# Patient Record
Sex: MALE | Race: WHITE | NOT HISPANIC OR LATINO | Employment: OTHER | URBAN - METROPOLITAN AREA
[De-identification: names, ages, dates, MRNs, and addresses within clinical notes are randomized per-mention and may not be internally consistent; named-entity substitution may affect disease eponyms.]

---

## 2019-01-11 ENCOUNTER — OFFICE VISIT (OUTPATIENT)
Dept: URGENT CARE | Facility: CLINIC | Age: 79
End: 2019-01-11
Payer: MEDICARE

## 2019-01-11 VITALS
TEMPERATURE: 99 F | BODY MASS INDEX: 34.3 KG/M2 | OXYGEN SATURATION: 96 % | RESPIRATION RATE: 18 BRPM | SYSTOLIC BLOOD PRESSURE: 135 MMHG | HEART RATE: 91 BPM | DIASTOLIC BLOOD PRESSURE: 77 MMHG | WEIGHT: 245 LBS | HEIGHT: 71 IN

## 2019-01-11 DIAGNOSIS — J20.9 BRONCHITIS, ACUTE, WITH BRONCHOSPASM: Primary | ICD-10-CM

## 2019-01-11 DIAGNOSIS — R05.9 COUGH: ICD-10-CM

## 2019-01-11 DIAGNOSIS — Z87.898 HISTORY OF MULTIPLE PULMONARY NODULES: ICD-10-CM

## 2019-01-11 PROCEDURE — 99203 OFFICE O/P NEW LOW 30 MIN: CPT | Mod: S$GLB,,, | Performed by: PHYSICIAN ASSISTANT

## 2019-01-11 PROCEDURE — 71046 X-RAY EXAM CHEST 2 VIEWS: CPT | Mod: FY,S$GLB,, | Performed by: RADIOLOGY

## 2019-01-11 PROCEDURE — 71046 XR CHEST PA AND LATERAL: ICD-10-PCS | Mod: FY,S$GLB,, | Performed by: RADIOLOGY

## 2019-01-11 PROCEDURE — 99203 PR OFFICE/OUTPT VISIT, NEW, LEVL III, 30-44 MIN: ICD-10-PCS | Mod: S$GLB,,, | Performed by: PHYSICIAN ASSISTANT

## 2019-01-11 RX ORDER — TORSEMIDE 20 MG/1
TABLET ORAL
Status: ON HOLD | COMMUNITY
Start: 2018-12-12 | End: 2022-02-17 | Stop reason: HOSPADM

## 2019-01-11 RX ORDER — IRBESARTAN 300 MG/1
TABLET ORAL
COMMUNITY
Start: 2018-10-17 | End: 2022-02-15

## 2019-01-11 RX ORDER — PREDNISONE 20 MG/1
20 TABLET ORAL DAILY
Qty: 5 TABLET | Refills: 0 | Status: SHIPPED | OUTPATIENT
Start: 2019-01-11 | End: 2019-01-16

## 2019-01-11 RX ORDER — BENZONATATE 100 MG/1
100 CAPSULE ORAL EVERY 6 HOURS PRN
Qty: 30 CAPSULE | Refills: 1 | Status: SHIPPED | OUTPATIENT
Start: 2019-01-11 | End: 2020-01-11

## 2019-01-11 RX ORDER — PROMETHAZINE HYDROCHLORIDE AND DEXTROMETHORPHAN HYDROBROMIDE 6.25; 15 MG/5ML; MG/5ML
5 SYRUP ORAL NIGHTLY PRN
Qty: 60 ML | Refills: 0 | Status: SHIPPED | OUTPATIENT
Start: 2019-01-11 | End: 2022-02-15

## 2019-01-11 RX ORDER — ALBUTEROL SULFATE 90 UG/1
2 AEROSOL, METERED RESPIRATORY (INHALATION) EVERY 6 HOURS PRN
Qty: 1 INHALER | Refills: 1 | Status: SHIPPED | OUTPATIENT
Start: 2019-01-11 | End: 2023-01-01

## 2019-01-11 NOTE — PATIENT INSTRUCTIONS
An UPPER RESPIRATORY ILLNESS is initially caused by a virus or allergies 95% of the time. The goal to help you feel better is drying up the drainage & stopping the cough so the virus can run it's course in about 10 days. If your drainage becomes more thick and worse after 7-10 days of trying the below over the counter medications, please see your PCP or return to Urgent Care for further evaluation.  Take steroid medication in the morning.  · Use Tessalon Perles for daytime cough.  · Cough syrup at night as needed. Do not drink alcohol, drive, or operate machinery while taking this medication as it can cause drowsiness.  · Continue doing nebulizer treatments at home as needed.  · May use albuterol inhaler as needed for wheezing.  · Antihistamines can help with sinus congestion. You may take a non-drowsy type (Allegra, Claritin) during the day and a Benadryl at night.  · If you DO NOT have Hypertension or any history of palpitations, it is ok to take over the counter Sudafed or Mucinex D or Allegra-D or Claritin-D or Zyrtec-D.  · If you do take one of the above, it is ok to combine that with plain over the counter Mucinex or Allegra or Claritin or Zyrtec. If, for example, you are taking Zyrtec -D, you can combine that with Mucinex, but not Mucinex-D.  If you are taking Mucinex-D, you can combine that with plain Allegra or Claritin or Zyrtec.   · If you DO have Hypertension or palpitations, it is safe to take Coricidin HBP for relief of sinus symptoms.  Rest.    Drink plenty of fluids.     Follow up with your PCP or specialty clinic as directed in the next 1-2 weeks if not improved or as needed.  You can call [phone] to schedule an appointment with the appropriate provider.    · Go to the ED if your symptoms worsen or if you develop high fever, shortness of breath, or chest pain.  - You must understand that you have received an Urgent Care treatment only and that you may be released before all of your medical  problems are known or treated.   - You, the patient, will arrange for follow up care as instructed.   - If your condition worsens or fails to improve we recommend that you receive another evaluation at the ER immediately or contact your PCP to discuss your concerns or return here.       Bronchitis with Wheezing (Viral or Bacterial: Adult)    Bronchitis is an infection of the air passages. It often occurs during a cold and is usually caused by a virus. Symptoms include cough with mucus (phlegm) and low-grade fever. This illness is contagious during the first few days and is spread through the air by coughing and sneezing, or by direct contact (touching the sick person and then touching your own eyes, nose, or mouth).  If there is a lot of inflammation, air flow is restricted. The air passages may also go into spasm, especially if you have asthma. This causes wheezing and difficulty breathing even in people who do not have asthma.  Bronchitis usually lasts 7 to 14 days. The wheezing should improve with treatment during the first week. An inhaler is often prescribed to relax the air passages and stop wheezing. Antibiotics will be prescribed if your doctor thinks there is also a secondary bacterial infection.  Home care  · If symptoms are severe, rest at home for the first 2 to 3 days. When you go back to your usual activities, don't let yourself get too tired.  · Do not smoke. Also avoid being exposed to secondhand smoke.  · You may use over-the-counter medicine to control fever or pain, unless another medicine was prescribed. Note: If you have chronic liver or kidney disease or have ever had a stomach ulcer or gastrointestinal bleeding, talk with your healthcare provider before using these medicines. Also talk to your provider if you are taking medicine to prevent blood clots.) Aspirin should never be given to anyone younger than 18 years of age who is ill with a viral infection or fever. It may cause severe liver or  brain damage.  · Your appetite may be poor, so a light diet is fine. Avoid dehydration by drinking 6 to 8 glasses of fluids per day (such as water, soft drinks, sports drinks, juices, tea, or soup). Extra fluids will help loosen secretions in the nose and lungs.  · Over-the-counter cough, cold, and sore-throat medicines will not shorten the length of the illness, but they may be helpful to reduce symptoms. (Note: Do not use decongestants if you have high blood pressure.)  · If you were given an inhaler, use it exactly as directed. If you need to use it more often than prescribed, your condition may be worsening. If this happens, contact your healthcare provider.  · If prescribed, finish all antibiotic medicine, even if you are feeling better after only a few days.  Follow-up care  Follow up with your healthcare provider, or as advised. If you had an X-ray or ECG (electrocardiogram), a specialist will review it. You will be notified of any new findings that may affect your care.  Note: If you are age 65 or older, or if you have a chronic lung disease or condition that affects your immune system, or you smoke, talk to your healthcare provider about having a pneumococcal vaccinations and a yearly influenza vaccination (flu shot).  When to seek medical advice  Call your healthcare provider right away if any of these occur:  · Fever of 100.4°F (38°C) or higher  · Coughing up increasing amounts of colored sputum  · Weakness, drowsiness, headache, facial pain, ear pain, or a stiff neck  Call 911, or get immediate medical care  Contact emergency services right away if any of these occur.  · Coughing up blood  · Worsening weakness, drowsiness, headache, or stiff neck  · Increased wheezing not helped with medication, shortness of breath, or pain with breathing  Date Last Reviewed: 9/13/2015  © 4689-1183 [employer]. [address] [place] [address], [place] [address]. All rights reserved. This information is not intended  as a substitute for professional medical care. Always follow your healthcare professional's instructions.

## 2019-01-11 NOTE — PROGRESS NOTES
"Subjective:       Patient ID: Alexander Kelly is a 78 y.o. male.    Vitals:  height is 5' 11" (1.803 m) and weight is 111.1 kg (245 lb). His temperature is 98.7 °F (37.1 °C). His blood pressure is 135/77 and his pulse is 91. His respiration is 18 and oxygen saturation is 96%.     Chief Complaint: Cough    Patient is here for 4 days of productive cough and congestion. He has tried Mucinex with some relief. Denies fever/chills, n/v, diarrhea, SOB, or chest pain. Cough is worse at night, but other than the cough he overall feels well. Reports he is UTD on his vaccines including Pneumonia and Flu shot. His past medical history is somewhat unclear other than he knows he has high blood pressure, says he has a Symbicort nebulizer that he uses at home which he gets samples from his pulmonologist. Denies a history of COPD, asthma, or PNA. Says he first went to pul 3 years ago for wheezing and she did a CT lung scan which showed nodules above the level of the heart but repeat scan showed no growth. He has not seen her in about 3 years because his wheezing resolved with Symbicort and he has been doing great.      Cough   This is a new problem. Episode onset: 4 days. The problem has been unchanged. The problem occurs constantly. The cough is productive of sputum. Associated symptoms include nasal congestion and postnasal drip. Pertinent negatives include no chest pain, chills, ear congestion, ear pain, eye redness, fever, hemoptysis, myalgias, rash, rhinorrhea, sore throat, shortness of breath or wheezing. The symptoms are aggravated by lying down. He has tried OTC cough suppressant, a beta-agonist inhaler and steroid inhaler for the symptoms. The treatment provided mild relief. There is no history of asthma, COPD or pneumonia.       Constitution: Negative for chills, sweating, fatigue and fever.   HENT: Positive for congestion and postnasal drip. Negative for ear pain, sinus pain, sinus pressure, sore throat and voice change.  "   Neck: Negative for painful lymph nodes.   Cardiovascular: Negative for chest pain and palpitations.   Eyes: Negative for eye redness.   Respiratory: Positive for cough and sputum production. Negative for chest tightness, bloody sputum, COPD, shortness of breath, stridor, wheezing and asthma.    Gastrointestinal: Negative for nausea and vomiting.   Musculoskeletal: Negative for muscle ache.   Skin: Negative for rash.   Allergic/Immunologic: Negative for seasonal allergies and asthma.   Hematologic/Lymphatic: Negative for swollen lymph nodes.       Objective:      Physical Exam   Constitutional: He is oriented to person, place, and time. He appears well-developed and well-nourished. He is cooperative.  Non-toxic appearance. He does not appear ill. No distress.   HENT:   Head: Normocephalic and atraumatic.   Right Ear: Hearing, tympanic membrane, external ear and ear canal normal. No middle ear effusion.   Left Ear: Hearing, tympanic membrane, external ear and ear canal normal.  No middle ear effusion.   Nose: Mucosal edema present. No rhinorrhea or nasal deformity. No epistaxis. Right sinus exhibits no maxillary sinus tenderness and no frontal sinus tenderness. Left sinus exhibits no maxillary sinus tenderness and no frontal sinus tenderness.   Mouth/Throat: Uvula is midline, oropharynx is clear and moist and mucous membranes are normal. No trismus in the jaw. Normal dentition. No uvula swelling. No oropharyngeal exudate, posterior oropharyngeal edema or posterior oropharyngeal erythema.   Wearing bilateral hearing aids  Patient is hard of hearing   Eyes: EOM and lids are normal. No scleral icterus.       Pinguecula of lateral right eye   Neck: Trachea normal, full passive range of motion without pain and phonation normal. Neck supple.   Cardiovascular: Normal rate, regular rhythm, intact distal pulses and normal pulses.   Murmur heard.   Systolic murmur is present.  Pulmonary/Chest: Effort normal. No respiratory  distress. He has no decreased breath sounds. He has wheezes in the right upper field. He has no rhonchi. He has no rales.   Abdominal: Soft. Normal appearance and bowel sounds are normal. He exhibits no distension. There is no tenderness.   Musculoskeletal: Normal range of motion. He exhibits no edema or deformity.   Neurological: He is alert and oriented to person, place, and time. He exhibits normal muscle tone. Coordination normal.   Skin: Skin is warm, dry and intact. He is not diaphoretic. No pallor.   Psychiatric: He has a normal mood and affect. His speech is normal and behavior is normal. Judgment and thought content normal. Cognition and memory are normal.   Nursing note and vitals reviewed.        Xr Chest Pa And Lateral  Result Date: 1/11/2019  EXAMINATION: XR CHEST PA AND LATERAL   CLINICAL HISTORY: Cough   TECHNIQUE: PA and lateral views of the chest were performed.   COMPARISON: None.   FINDINGS: Mediastinal structures are midline.  Hilar contours are unremarkable.  Cardiac silhouette is normal in size.  Lung volumes are symmetric.  Subsegmental band like opacities noted within the right lung base, presumably atelectasis.  No consolidation.  No pneumothorax or pleural effusions.  No free air beneath the diaphragm.  No acute osseous abnormalities.  Degenerative changes of the spine noted.   IMPRESSION:  1. No acute radiographic findings in the chest.   Electronically signed by: Tristin Dubon MD   Date: 01/11/2019  Time: 11:51      Assessment:       1. Bronchitis, acute, with bronchospasm    2. Cough    3. History of multiple pulmonary nodules        Plan:       No evidence of PNA. Will treat for viral bronchitis. Warned of the risk of development of pneumonia in his age group and warning signs that should prompt ED visit. Has an appointment in early February with his PCP, will f/u with him at this point.    Bronchitis, acute, with bronchospasm  -     predniSONE (DELTASONE) 20 MG tablet; Take 1 tablet  (20 mg total) by mouth once daily. for 5 days  Dispense: 5 tablet; Refill: 0  -     albuterol (VENTOLIN HFA) 90 mcg/actuation inhaler; Inhale 2 puffs into the lungs every 6 (six) hours as needed for Wheezing. Rescue  Dispense: 1 Inhaler; Refill: 1    Cough  -     XR CHEST PA AND LATERAL; Future; Expected date: 01/11/2019  -     promethazine-dextromethorphan (PROMETHAZINE-DM) 6.25-15 mg/5 mL Syrp; Take 5 mLs by mouth nightly as needed.  Dispense: 60 mL; Refill: 0  -     benzonatate (TESSALON PERLES) 100 MG capsule; Take 1 capsule (100 mg total) by mouth every 6 (six) hours as needed for Cough.  Dispense: 30 capsule; Refill: 1    History of multiple pulmonary nodules  -     XR CHEST PA AND LATERAL; Future; Expected date: 01/11/2019      Patient Instructions   An UPPER RESPIRATORY ILLNESS is initially caused by a virus or allergies 95% of the time. The goal to help you feel better is drying up the drainage & stopping the cough so the virus can run it's course in about 10 days. If your drainage becomes more thick and worse after 7-10 days of trying the below over the counter medications, please see your PCP or return to Urgent Care for further evaluation.  Take steroid medication in the morning.  · Use Tessalon Perles for daytime cough.  · Cough syrup at night as needed. Do not drink alcohol, drive, or operate machinery while taking this medication as it can cause drowsiness.  · Continue doing nebulizer treatments at home as needed.  · May use albuterol inhaler as needed for wheezing.  · Antihistamines can help with sinus congestion. You may take a non-drowsy type (Allegra, Claritin) during the day and a Benadryl at night.  · If you DO NOT have Hypertension or any history of palpitations, it is ok to take over the counter Sudafed or Mucinex D or Allegra-D or Claritin-D or Zyrtec-D.  · If you do take one of the above, it is ok to combine that with plain over the counter Mucinex or Allegra or Claritin or Zyrtec. If, for  example, you are taking Zyrtec -D, you can combine that with Mucinex, but not Mucinex-D.  If you are taking Mucinex-D, you can combine that with plain Allegra or Claritin or Zyrtec.   · If you DO have Hypertension or palpitations, it is safe to take Coricidin HBP for relief of sinus symptoms.  Rest.    Drink plenty of fluids.     Follow up with your PCP or specialty clinic as directed in the next 1-2 weeks if not improved or as needed.  You can call [phone] to schedule an appointment with the appropriate provider.    · Go to the ED if your symptoms worsen or if you develop high fever, shortness of breath, or chest pain.  - You must understand that you have received an Urgent Care treatment only and that you may be released before all of your medical problems are known or treated.   - You, the patient, will arrange for follow up care as instructed.   - If your condition worsens or fails to improve we recommend that you receive another evaluation at the ER immediately or contact your PCP to discuss your concerns or return here.       Bronchitis with Wheezing (Viral or Bacterial: Adult)    Bronchitis is an infection of the air passages. It often occurs during a cold and is usually caused by a virus. Symptoms include cough with mucus (phlegm) and low-grade fever. This illness is contagious during the first few days and is spread through the air by coughing and sneezing, or by direct contact (touching the sick person and then touching your own eyes, nose, or mouth).  If there is a lot of inflammation, air flow is restricted. The air passages may also go into spasm, especially if you have asthma. This causes wheezing and difficulty breathing even in people who do not have asthma.  Bronchitis usually lasts 7 to 14 days. The wheezing should improve with treatment during the first week. An inhaler is often prescribed to relax the air passages and stop wheezing. Antibiotics will be prescribed if your doctor thinks  there is also a secondary bacterial infection.  Home care  · If symptoms are severe, rest at home for the first 2 to 3 days. When you go back to your usual activities, don't let yourself get too tired.  · Do not smoke. Also avoid being exposed to secondhand smoke.  · You may use over-the-counter medicine to control fever or pain, unless another medicine was prescribed. Note: If you have chronic liver or kidney disease or have ever had a stomach ulcer or gastrointestinal bleeding, talk with your healthcare provider before using these medicines. Also talk to your provider if you are taking medicine to prevent blood clots.) Aspirin should never be given to anyone younger than 18 years of age who is ill with a viral infection or fever. It may cause severe liver or brain damage.  · Your appetite may be poor, so a light diet is fine. Avoid dehydration by drinking 6 to 8 glasses of fluids per day (such as water, soft drinks, sports drinks, juices, tea, or soup). Extra fluids will help loosen secretions in the nose and lungs.  · Over-the-counter cough, cold, and sore-throat medicines will not shorten the length of the illness, but they may be helpful to reduce symptoms. (Note: Do not use decongestants if you have high blood pressure.)  · If you were given an inhaler, use it exactly as directed. If you need to use it more often than prescribed, your condition may be worsening. If this happens, contact your healthcare provider.  · If prescribed, finish all antibiotic medicine, even if you are feeling better after only a few days.  Follow-up care  Follow up with your healthcare provider, or as advised. If you had an X-ray or ECG (electrocardiogram), a specialist will review it. You will be notified of any new findings that may affect your care.  Note: If you are age 65 or older, or if you have a chronic lung disease or condition that affects your immune system, or you smoke, talk to your healthcare provider about having a  pneumococcal vaccinations and a yearly influenza vaccination (flu shot).  When to seek medical advice  Call your healthcare provider right away if any of these occur:  · Fever of 100.4°F (38°C) or higher  · Coughing up increasing amounts of colored sputum  · Weakness, drowsiness, headache, facial pain, ear pain, or a stiff neck  Call 911, or get immediate medical care  Contact emergency services right away if any of these occur.  · Coughing up blood  · Worsening weakness, drowsiness, headache, or stiff neck  · Increased wheezing not helped with medication, shortness of breath, or pain with breathing  Date Last Reviewed: 9/13/2015  © 7607-6867 [employer]. [address] [place] [address], [place] [address]. All rights reserved. This information is not intended as a substitute for professional medical care. Always follow your healthcare professional's instructions.

## 2021-12-08 ENCOUNTER — OFFICE VISIT (OUTPATIENT)
Dept: URGENT CARE | Facility: CLINIC | Age: 81
End: 2021-12-08
Payer: MEDICARE

## 2021-12-08 VITALS
OXYGEN SATURATION: 98 % | HEIGHT: 71 IN | BODY MASS INDEX: 34.3 KG/M2 | RESPIRATION RATE: 16 BRPM | DIASTOLIC BLOOD PRESSURE: 70 MMHG | SYSTOLIC BLOOD PRESSURE: 128 MMHG | HEART RATE: 60 BPM | TEMPERATURE: 97 F | WEIGHT: 245 LBS

## 2021-12-08 DIAGNOSIS — S50.812A ABRASION OF LEFT FOREARM, INITIAL ENCOUNTER: Primary | ICD-10-CM

## 2021-12-08 PROCEDURE — 99214 PR OFFICE/OUTPT VISIT, EST, LEVL IV, 30-39 MIN: ICD-10-PCS | Mod: 25,S$GLB,, | Performed by: FAMILY MEDICINE

## 2021-12-08 PROCEDURE — 90715 TDAP VACCINE GREATER THAN OR EQUAL TO 7YO IM: ICD-10-PCS | Mod: AT,S$GLB,, | Performed by: FAMILY MEDICINE

## 2021-12-08 PROCEDURE — 90715 TDAP VACCINE 7 YRS/> IM: CPT | Mod: AT,S$GLB,, | Performed by: FAMILY MEDICINE

## 2021-12-08 PROCEDURE — 90471 TDAP VACCINE GREATER THAN OR EQUAL TO 7YO IM: ICD-10-PCS | Mod: AT,S$GLB,, | Performed by: FAMILY MEDICINE

## 2021-12-08 PROCEDURE — 90471 IMMUNIZATION ADMIN: CPT | Mod: AT,S$GLB,, | Performed by: FAMILY MEDICINE

## 2021-12-08 PROCEDURE — 99214 OFFICE O/P EST MOD 30 MIN: CPT | Mod: 25,S$GLB,, | Performed by: FAMILY MEDICINE

## 2021-12-08 RX ORDER — MUPIROCIN 20 MG/G
OINTMENT TOPICAL
Qty: 22 G | Refills: 1 | Status: SHIPPED | OUTPATIENT
Start: 2021-12-08 | End: 2022-03-23

## 2022-01-24 ENCOUNTER — TELEPHONE (OUTPATIENT)
Dept: CARDIOLOGY | Facility: CLINIC | Age: 82
End: 2022-01-24
Payer: MEDICARE

## 2022-02-09 ENCOUNTER — OFFICE VISIT (OUTPATIENT)
Dept: CARDIOLOGY | Facility: CLINIC | Age: 82
End: 2022-02-09
Payer: MEDICARE

## 2022-02-09 ENCOUNTER — TELEPHONE (OUTPATIENT)
Dept: WOUND CARE | Facility: CLINIC | Age: 82
End: 2022-02-09
Payer: MEDICARE

## 2022-02-09 VITALS
WEIGHT: 251.75 LBS | DIASTOLIC BLOOD PRESSURE: 76 MMHG | SYSTOLIC BLOOD PRESSURE: 173 MMHG | BODY MASS INDEX: 35.24 KG/M2 | HEIGHT: 71 IN | HEART RATE: 58 BPM

## 2022-02-09 DIAGNOSIS — R60.0 LOCALIZED EDEMA: ICD-10-CM

## 2022-02-09 DIAGNOSIS — I87.2 VENOUS STASIS DERMATITIS OF BOTH LOWER EXTREMITIES: ICD-10-CM

## 2022-02-09 DIAGNOSIS — L97.221 VENOUS STASIS ULCER OF LEFT CALF LIMITED TO BREAKDOWN OF SKIN WITH VARICOSE VEINS: ICD-10-CM

## 2022-02-09 DIAGNOSIS — E66.9 OBESITY (BMI 30-39.9): ICD-10-CM

## 2022-02-09 DIAGNOSIS — R21 RASH: ICD-10-CM

## 2022-02-09 DIAGNOSIS — F10.11 HISTORY OF ALCOHOL ABUSE: ICD-10-CM

## 2022-02-09 DIAGNOSIS — I50.31 ACUTE DIASTOLIC CONGESTIVE HEART FAILURE: ICD-10-CM

## 2022-02-09 DIAGNOSIS — I83.022 VENOUS STASIS ULCER OF LEFT CALF LIMITED TO BREAKDOWN OF SKIN WITH VARICOSE VEINS: ICD-10-CM

## 2022-02-09 DIAGNOSIS — R06.02 SOB (SHORTNESS OF BREATH): ICD-10-CM

## 2022-02-09 DIAGNOSIS — I89.0 LYMPHEDEMA OF BOTH LOWER EXTREMITIES: ICD-10-CM

## 2022-02-09 PROCEDURE — 3078F DIAST BP <80 MM HG: CPT | Mod: CPTII,S$GLB,, | Performed by: INTERNAL MEDICINE

## 2022-02-09 PROCEDURE — 1159F MED LIST DOCD IN RCRD: CPT | Mod: CPTII,S$GLB,, | Performed by: INTERNAL MEDICINE

## 2022-02-09 PROCEDURE — 3288F PR FALLS RISK ASSESSMENT DOCUMENTED: ICD-10-PCS | Mod: CPTII,S$GLB,, | Performed by: INTERNAL MEDICINE

## 2022-02-09 PROCEDURE — 1100F PTFALLS ASSESS-DOCD GE2>/YR: CPT | Mod: CPTII,S$GLB,, | Performed by: INTERNAL MEDICINE

## 2022-02-09 PROCEDURE — 99999 PR PBB SHADOW E&M-EST. PATIENT-LVL IV: ICD-10-PCS | Mod: PBBFAC,,, | Performed by: INTERNAL MEDICINE

## 2022-02-09 PROCEDURE — 3288F FALL RISK ASSESSMENT DOCD: CPT | Mod: CPTII,S$GLB,, | Performed by: INTERNAL MEDICINE

## 2022-02-09 PROCEDURE — 1159F PR MEDICATION LIST DOCUMENTED IN MEDICAL RECORD: ICD-10-PCS | Mod: CPTII,S$GLB,, | Performed by: INTERNAL MEDICINE

## 2022-02-09 PROCEDURE — 1126F AMNT PAIN NOTED NONE PRSNT: CPT | Mod: CPTII,S$GLB,, | Performed by: INTERNAL MEDICINE

## 2022-02-09 PROCEDURE — 99205 OFFICE O/P NEW HI 60 MIN: CPT | Mod: S$GLB,,, | Performed by: INTERNAL MEDICINE

## 2022-02-09 PROCEDURE — 1126F PR PAIN SEVERITY QUANTIFIED, NO PAIN PRESENT: ICD-10-PCS | Mod: CPTII,S$GLB,, | Performed by: INTERNAL MEDICINE

## 2022-02-09 PROCEDURE — 1100F PR PT FALLS ASSESS DOC 2+ FALLS/FALL W/INJURY/YR: ICD-10-PCS | Mod: CPTII,S$GLB,, | Performed by: INTERNAL MEDICINE

## 2022-02-09 PROCEDURE — 3078F PR MOST RECENT DIASTOLIC BLOOD PRESSURE < 80 MM HG: ICD-10-PCS | Mod: CPTII,S$GLB,, | Performed by: INTERNAL MEDICINE

## 2022-02-09 PROCEDURE — 3077F SYST BP >= 140 MM HG: CPT | Mod: CPTII,S$GLB,, | Performed by: INTERNAL MEDICINE

## 2022-02-09 PROCEDURE — 99205 PR OFFICE/OUTPT VISIT, NEW, LEVL V, 60-74 MIN: ICD-10-PCS | Mod: S$GLB,,, | Performed by: INTERNAL MEDICINE

## 2022-02-09 PROCEDURE — 3077F PR MOST RECENT SYSTOLIC BLOOD PRESSURE >= 140 MM HG: ICD-10-PCS | Mod: CPTII,S$GLB,, | Performed by: INTERNAL MEDICINE

## 2022-02-09 PROCEDURE — 99999 PR PBB SHADOW E&M-EST. PATIENT-LVL IV: CPT | Mod: PBBFAC,,, | Performed by: INTERNAL MEDICINE

## 2022-02-09 NOTE — PROGRESS NOTES
[facility]      Chief Complaint   Patient presents with    Lymphedema       Patient ID: Alexander Kelly is a 81 y.o. male with HFpEF, HTN, COPD, bilateral hearing loss, alcoholism, obesity, who presents for an initial appointment.  Pertinent history/events are as follows:     -Pt kindly referred by Dr. Martin for evaluation of lymphedema.    HPI:  Mr. Kelly states he was diagnosed with BLE lymphedema 4 years ago.  He uses a lymphedema pump daily.  Reports gaining 30 pounds in 3 weeks in 12/2021.  States he lost the weight after being started on bumex and metolazone.  Reports SOB starting 1 month ago, which is new for him.  He currently takes bumex 1 mg daily, and metolazone 2.5 mg daily at needed for weight gain.  Formerly smoked 1 pack a day for 16 years.  Quit at age 32.      Past Medical History:   Diagnosis Date    Bilateral hearing loss     Hypertension      History reviewed. No pertinent surgical history.  Social History     Socioeconomic History    Marital status:    Tobacco Use    Smoking status: Never Smoker    Smokeless tobacco: Never Used     Family History   Problem Relation Age of Onset    No Known Problems Mother     No Known Problems Father        Review of patient's allergies indicates:  No Known Allergies    Medication List with Changes/Refills   Current Medications    ALBUTEROL (VENTOLIN HFA) 90 MCG/ACTUATION INHALER    Inhale 2 puffs into the lungs every 6 (six) hours as needed for Wheezing. Rescue    IRBESARTAN (AVAPRO) 300 MG TABLET        MUPIROCIN (BACTROBAN) 2 % OINTMENT    Apply to affected area 3 times daily    PROMETHAZINE-DEXTROMETHORPHAN (PROMETHAZINE-DM) 6.25-15 MG/5 ML SYRP    Take 5 mLs by mouth nightly as needed.    TORSEMIDE (DEMADEX) 20 MG TAB           Review of Systems  Constitution: Denies chills, fever, and sweats.  HENT: Denies headaches or blurry vision.  Cardiovascular: Denies chest pain or irregular heart beat.  Respiratory: Denies cough or  "shortness of breath.  Gastrointestinal: Denies abdominal pain, nausea, or vomiting.  Musculoskeletal: Positive for leg swelling.  Neurological: Denies dizziness or focal weakness.  Psychiatric/Behavioral: Normal mental status.  Hematologic/Lymphatic: Denies bleeding problem or easy bruising/bleeding.  Skin: Denies rash or suspicious lesions    Physical Examination  Ht 5' 11" (1.803 m)   Wt 114.2 kg (251 lb 12.3 oz)   BMI 35.11 kg/m²     Constitutional: No acute distress, conversant  HEENT: Sclera anicteric, Pupils equal, round and reactive to light, extraocular motions intact, Oropharynx clear  Neck: No JVD, no carotid bruits  Cardiovascular: regular rate and rhythm, no murmur, rubs or gallops, normal S1/S2  Pulmonary: Clear to auscultation bilaterally  Abdominal: Abdomen soft, nontender, nondistended, positive bowel sounds  Extremities: BLE's with trace pitting edema and changes consistent with lymphedema  Left shin with a moderate sized healing wound.   Both legs with diffuse, erythematous, lacy rash    Pulses:  Carotid pulses are 2+ on the right side, and 2+ on the left side.  Radial pulses are 2+ on the right side, and 2+ on the left side.   Femoral pulses are 2+ on the right side, and 2+ on the left side.  Popliteal pulses are 2+ on the right side, and 2+ on the left side.   Dorsalis pedis pulses are 2+ on the right side, and 2+ on the left side.   Posterior tibial pulses are 2+ on the right side, and 2+ on the left side.    Skin: No ecchymosis, erythema, or ulcers  Psych: Alert and oriented x 3, appropriate affect  Neuro: CNII-XII intact, no focal deficits    Labs:  Most Recent Data  CBC: No results found for: WBC, HGB, HCT, PLT, MCV, RDW  BMP: No results found for: NA, K, CL, CO2, BUN, CREATININE, GLU, CALCIUM, MG, PHOS  LFTS; No results found for: PROT, ALBUMIN, BILITOT, AST, ALKPHOS, ALT, GGT  COAGS: No results found for: INR, PROTIME, PTT  FLP: No results found for: CHOL, HDL, LDLCALC, TRIG, " CHOLHDL  CARDIAC: No results found for: TROPONINI, CKMB, BNP        Assessment/Plan:  Alexander Kelly is a 81 y.o. male with HFpEF, HTN, COPD, bilateral hearing loss, alcoholism, obesity, who presents for an initial appointment.    1. BLE Lymphedema with Venous Stasis Wounds- Check BLE venous reflux study and TUNDE study.  Refer to lymphedema clinic.  Continue bumex 1 mg daily.  Continue metolazone 2.5 mg daily at needed for weight gain of at least 2 pounds in a day or 5 pounds in a week.  Refer to wound care.  Given rash, refer to Dermatology for consideration for biopsy.   Pt to limit sodium intake to 2,000 mg daily.  Limit volume intake to 1.5 liters daily.      2. SOB- Check echo, CXR, and BNP.      3. HTN- Continue current medications.  Pt to keep log of blood pressure/heart rate and bring in next visit for review.     4. Obesity- Encourage diet, exercise and weight loss.      Follow up in 1 month    Total duration of face to face visit time 45 minutes.  Total time spent counseling greater than fifty percent of total visit time.  Counseling included discussion regarding imaging findings, diagnosis, possibilities, treatment options, risks and benefits.  The patient had many questions regarding the options and long-term effects.    Artur Healy MD, PhD  Interventional Cardiology

## 2022-02-09 NOTE — TELEPHONE ENCOUNTER
Patient wife Mrs. Tran called back and stated that she don't see no need for Mr. Munoz to come to wound care because he has a appointment with the dermatology on tomorrow 02/10/2022 at 11 a.m.

## 2022-02-09 NOTE — TELEPHONE ENCOUNTER
Call patient no one answer to home phone are cell phone, left message on home phone informing them that this was [facility] on [address]. Was calling to schedule Mr. Alexander laureano this was the available  date/time 02/22/2022@1030a.m to give me a call back to confirm this appointment number was left [phone]

## 2022-02-09 NOTE — TELEPHONE ENCOUNTER
----- Message from Maria Antonia Snider MA sent at 2/9/2022 10:56 AM CST -----  Regarding: Referral  Good Morning Staff!    Dr. Healy put in a referral for this patient to see someone in your clinic. Can someone call and assist the patient with scheduling?    Thanks,  Maria Antonia

## 2022-02-09 NOTE — PATIENT INSTRUCTIONS
Assessment/Plan:  Alexander Kelly is a 81 y.o. male with HFpEF, HTN, COPD, bilateral hearing loss, alcoholism, obesity, who presents for an initial appointment.    1. BLE Lymphedema with Venous Stasis Wounds- Check BLE venous reflux study and TUNDE study.  Refer to lymphedema clinic.  Continue bumex 1 mg daily.  Continue metolazone 2.5 mg daily at needed for weight gain of at least 2 pounds in a day or 5 pounds in a week.  Refer to wound care.  Given rash, refer to Dermatology for consideration for biopsy.   Pt to limit sodium intake to 2,000 mg daily.  Limit volume intake to 1.5 liters daily.      2. SOB- Check echo, CXR, and BNP.      3. HTN- Continue current medications.  Pt to keep log of blood pressure/heart rate and bring in next visit for review.     4. Obesity- Encourage diet, exercise and weight loss.      Follow up in 1 month

## 2022-02-10 ENCOUNTER — OFFICE VISIT (OUTPATIENT)
Dept: DERMATOLOGY | Facility: CLINIC | Age: 82
End: 2022-02-10
Payer: MEDICARE

## 2022-02-10 VITALS — WEIGHT: 251 LBS | BODY MASS INDEX: 35.01 KG/M2

## 2022-02-10 DIAGNOSIS — L30.9 ECZEMA OF BOTH HANDS: ICD-10-CM

## 2022-02-10 DIAGNOSIS — L85.3 XEROSIS CUTIS: ICD-10-CM

## 2022-02-10 DIAGNOSIS — Z85.828 HISTORY OF SKIN CANCER: ICD-10-CM

## 2022-02-10 DIAGNOSIS — L82.1 SEBORRHEIC KERATOSES: ICD-10-CM

## 2022-02-10 DIAGNOSIS — R21 RASH: ICD-10-CM

## 2022-02-10 DIAGNOSIS — T14.8XXA ABRASION: Primary | ICD-10-CM

## 2022-02-10 DIAGNOSIS — I89.0 LYMPHEDEMA OF BOTH LOWER EXTREMITIES: ICD-10-CM

## 2022-02-10 DIAGNOSIS — L57.0 MULTIPLE ACTINIC KERATOSES: ICD-10-CM

## 2022-02-10 LAB
ANION GAP SERPL CALC-SCNC: 14.2 MMOL/L (ref 9–18)
BNP SERPL-MCNC: 639 PG/ML
BUN BLD-MCNC: 12 MG/DL (ref 7–21)
BUN/CREAT SERPL: 18 (ref 6–22)
CALC OSMOLALITY: 252 MOSM/KG (ref 275–295)
CALCIUM SERPL-MCNC: 9.4 MG/DL (ref 8.5–10.3)
CHLORIDE SERPL-SCNC: 89 MMOL/L (ref 98–107)
CO2 SERPL-SCNC: 28 MMOL/L (ref 21–31)
CREAT SERPL-MCNC: 0.68 MG/DL (ref 0.7–1.2)
EGFR: 105 ML/MIN
GFR: 90 ML/MIN
GLUCOSE SERPL-MCNC: 86 MG/DL (ref 70–100)
POTASSIUM SERPL-SCNC: 5.2 MMOL/L (ref 3.5–5)
SODIUM BLD-SCNC: 126 MMOL/L (ref 135–145)

## 2022-02-10 PROCEDURE — 1159F MED LIST DOCD IN RCRD: CPT | Mod: CPTII,S$GLB,, | Performed by: DERMATOLOGY

## 2022-02-10 PROCEDURE — 17000 PR DESTRUCTION(LASER SURGERY,CRYOSURGERY,CHEMOSURGERY),PREMALIGNANT LESIONS,FIRST LESION: ICD-10-PCS | Mod: S$GLB,,, | Performed by: DERMATOLOGY

## 2022-02-10 PROCEDURE — 99204 OFFICE O/P NEW MOD 45 MIN: CPT | Mod: 25,S$GLB,, | Performed by: DERMATOLOGY

## 2022-02-10 PROCEDURE — 1160F RVW MEDS BY RX/DR IN RCRD: CPT | Mod: CPTII,S$GLB,, | Performed by: DERMATOLOGY

## 2022-02-10 PROCEDURE — 17003 DESTRUCT PREMALG LES 2-14: CPT | Mod: S$GLB,,, | Performed by: DERMATOLOGY

## 2022-02-10 PROCEDURE — 99999 PR PBB SHADOW E&M-EST. PATIENT-LVL III: CPT | Mod: PBBFAC,,, | Performed by: DERMATOLOGY

## 2022-02-10 PROCEDURE — 1101F PT FALLS ASSESS-DOCD LE1/YR: CPT | Mod: CPTII,S$GLB,, | Performed by: DERMATOLOGY

## 2022-02-10 PROCEDURE — 17000 DESTRUCT PREMALG LESION: CPT | Mod: S$GLB,,, | Performed by: DERMATOLOGY

## 2022-02-10 PROCEDURE — 1160F PR REVIEW ALL MEDS BY PRESCRIBER/CLIN PHARMACIST DOCUMENTED: ICD-10-PCS | Mod: CPTII,S$GLB,, | Performed by: DERMATOLOGY

## 2022-02-10 PROCEDURE — 17003 DESTRUCTION, PREMALIGNANT LESIONS; SECOND THROUGH 14 LESIONS: ICD-10-PCS | Mod: S$GLB,,, | Performed by: DERMATOLOGY

## 2022-02-10 PROCEDURE — 99999 PR PBB SHADOW E&M-EST. PATIENT-LVL III: ICD-10-PCS | Mod: PBBFAC,,, | Performed by: DERMATOLOGY

## 2022-02-10 PROCEDURE — 3288F FALL RISK ASSESSMENT DOCD: CPT | Mod: CPTII,S$GLB,, | Performed by: DERMATOLOGY

## 2022-02-10 PROCEDURE — 1159F PR MEDICATION LIST DOCUMENTED IN MEDICAL RECORD: ICD-10-PCS | Mod: CPTII,S$GLB,, | Performed by: DERMATOLOGY

## 2022-02-10 PROCEDURE — 1126F AMNT PAIN NOTED NONE PRSNT: CPT | Mod: CPTII,S$GLB,, | Performed by: DERMATOLOGY

## 2022-02-10 PROCEDURE — 1126F PR PAIN SEVERITY QUANTIFIED, NO PAIN PRESENT: ICD-10-PCS | Mod: CPTII,S$GLB,, | Performed by: DERMATOLOGY

## 2022-02-10 PROCEDURE — 1101F PR PT FALLS ASSESS DOC 0-1 FALLS W/OUT INJ PAST YR: ICD-10-PCS | Mod: CPTII,S$GLB,, | Performed by: DERMATOLOGY

## 2022-02-10 PROCEDURE — 3288F PR FALLS RISK ASSESSMENT DOCUMENTED: ICD-10-PCS | Mod: CPTII,S$GLB,, | Performed by: DERMATOLOGY

## 2022-02-10 PROCEDURE — 99204 PR OFFICE/OUTPT VISIT, NEW, LEVL IV, 45-59 MIN: ICD-10-PCS | Mod: 25,S$GLB,, | Performed by: DERMATOLOGY

## 2022-02-10 PROCEDURE — 87070 CULTURE OTHR SPECIMN AEROBIC: CPT | Performed by: DERMATOLOGY

## 2022-02-10 NOTE — PROGRESS NOTES
"  Subjective:       Patient ID:  Alexander Kelly is a 81 y.o. male who presents for   Chief Complaint   Patient presents with    Rash     Legs for several years     Spot     scalp    Skin Check     ubse     Record review 2/9  HPI:  Mr. Kelly states he was diagnosed with BLE lymphedema 4 years ago.  He uses a lymphedema pump daily.  Reports gaining 30 pounds in 3 weeks in 12/2021.  States he lost the weight after being started on bumex and metolazone.  Reports SOB starting 1 month ago, which is new for him.  He currently takes bumex 1 mg daily, and metolazone 2.5 mg daily at needed for weight gain.  Formerly smoked 1 pack a day for 16 years.  Quit at age 32.       1. BLE Lymphedema with Venous Stasis Wounds- Check BLE venous reflux study and TUNDE study.  Refer to lymphedema clinic.  Continue bumex 1 mg daily.  Continue metolazone 2.5 mg daily at needed for weight gain of at least 2 pounds in a day or 5 pounds in a week.  Refer to wound care.  Given rash, refer to Dermatology for consideration for biopsy.   Pt to limit sodium intake to 2,000 mg daily.  Limit volume intake to 1.5 liters daily."      Has referral in for pt/lymphedema clinic from yesterday.  Did not schedule with wound care.     Some of hx per daughter who accompanies him.    He fell several weeks ago and has abrasions on left arm and both lower legs using mupirocin ug, dry skin legs using lubriderm, has compression pump at home using bid for lymphedema which has increased recently due to his weight gain.    Also had skin cancer removed per Dr Ryan mohs surgery, several new spots on scalp    Would like spots on back checked.  Rash on hands for years worse in winter uses lubriderm has problems with fissures.       Review of Systems   Constitutional: Negative for fever, chills, weight loss, weight gain, fatigue, night sweats and malaise.   Skin: Negative for daily sunscreen use, activity-related sunscreen use and wears hat.   Hematologic/Lymphatic: " Bruises/bleeds easily.        Objective:    Physical Exam   Constitutional: He appears well-developed and well-nourished.   Neurological: He is alert and oriented to person, place, and time.   Psychiatric: He has a normal mood and affect.   Skin:   Areas Examined (abnormalities noted in diagram):   Scalp / Hair Palpated and Inspected  Head / Face Inspection Performed  Neck Inspection Performed  Chest / Axilla Inspection Performed  Back Inspection Performed  RUE Inspected  LUE Inspection Performed  RLE Inspected  LLE Inspection Performed                   Diagram Legend     Erythematous scaling macule/papule c/w actinic keratosis       Vascular papule c/w angioma      Pigmented verrucoid papule/plaque c/w seborrheic keratosis      Yellow umbilicated papule c/w sebaceous hyperplasia      Irregularly shaped tan macule c/w lentigo     1-2 mm smooth white papules consistent with Milia      Movable subcutaneous cyst with punctum c/w epidermal inclusion cyst      Subcutaneous movable cyst c/w pilar cyst      Firm pink to brown papule c/w dermatofibroma      Pedunculated fleshy papule(s) c/w skin tag(s)      Evenly pigmented macule c/w junctional nevus     Mildly variegated pigmented, slightly irregular-bordered macule c/w mildly atypical nevus      Flesh colored to evenly pigmented papule c/w intradermal nevus       Pink pearly papule/plaque c/w basal cell carcinoma      Erythematous hyperkeratotic cursted plaque c/w SCC      Surgical scar with no sign of skin cancer recurrence      Open and closed comedones      Inflammatory papules and pustules      Verrucoid papule consistent consistent with wart     Erythematous eczematous patches and plaques     Dystrophic onycholytic nail with subungual debris c/w onychomycosis     Umbilicated papule    Erythematous-base heme-crusted tan verrucoid plaque consistent with inflamed seborrheic keratosis     Erythematous Silvery Scaling Plaque c/w Psoriasis     See  annotation      Assessment / Plan:        Abrasions  -     Aerobic culture  Cont mupirocin    Rash  -     Ambulatory referral/consult to Dermatology    Lymphedema of both lower extremities  Has referral to PT lymphedema clinic    History of skin cancer  Area(s) of previous NMSC evaluated with no signs of recurrence.    . No lesions suspicious for malignancy noted.    Recommend daily sun protection/avoidance and use of at least SPF 30, broad spectrum sunscreen (OTC drug).       Multiple actinic keratoses   Cryosurgery Procedure Note    Verbal consent from the patient is obtained and the patient is aware of the precancerous quality and need for treatment of these lesions. Liquid nitrogen cryosurgery is applied to the 3 actinic keratoses, as detailed in the physical exam, to produce a freeze injury.      Eczema of both hands  lubriderm lotion  Liquid band aid for fissures    Xerosis cutis  Cont lubriderm      Seborrheic keratoses  reassurance               Follow up in about 6 months (around 8/10/2022), or if symptoms worsen or fail to improve.

## 2022-02-12 LAB — BACTERIA SPEC AEROBE CULT: NORMAL

## 2022-02-14 ENCOUNTER — HOSPITAL ENCOUNTER (OUTPATIENT)
Dept: CARDIOLOGY | Facility: HOSPITAL | Age: 82
Discharge: HOME | End: 2022-02-14
Attending: INTERNAL MEDICINE
Payer: MEDICARE

## 2022-02-14 ENCOUNTER — HOSPITAL ENCOUNTER (INPATIENT)
Facility: HOSPITAL | Age: 82
LOS: 3 days | Discharge: HOME | DRG: 291 | End: 2022-02-17
Attending: EMERGENCY MEDICINE | Admitting: INTERNAL MEDICINE
Payer: MEDICARE

## 2022-02-14 VITALS
HEART RATE: 68 BPM | WEIGHT: 251 LBS | HEIGHT: 71 IN | DIASTOLIC BLOOD PRESSURE: 90 MMHG | BODY MASS INDEX: 35.14 KG/M2 | SYSTOLIC BLOOD PRESSURE: 170 MMHG

## 2022-02-14 DIAGNOSIS — R06.02 SOB (SHORTNESS OF BREATH): ICD-10-CM

## 2022-02-14 DIAGNOSIS — M79.89 LEG SWELLING: ICD-10-CM

## 2022-02-14 DIAGNOSIS — I50.31 ACUTE DIASTOLIC CONGESTIVE HEART FAILURE: Primary | ICD-10-CM

## 2022-02-14 DIAGNOSIS — R07.9 CHEST PAIN: ICD-10-CM

## 2022-02-14 DIAGNOSIS — R06.02 SHORTNESS OF BREATH: ICD-10-CM

## 2022-02-14 PROBLEM — J90 BILATERAL PLEURAL EFFUSION: Status: ACTIVE | Noted: 2022-02-14

## 2022-02-14 PROBLEM — I27.20 PULMONARY HYPERTENSION: Status: ACTIVE | Noted: 2022-02-14

## 2022-02-14 PROBLEM — J96.01 ACUTE RESPIRATORY FAILURE WITH HYPOXIA AND HYPERCARBIA: Status: ACTIVE | Noted: 2022-02-14

## 2022-02-14 PROBLEM — I50.33 ACUTE ON CHRONIC DIASTOLIC CONGESTIVE HEART FAILURE: Status: ACTIVE | Noted: 2022-02-14

## 2022-02-14 PROBLEM — J96.02 ACUTE RESPIRATORY FAILURE WITH HYPOXIA AND HYPERCARBIA: Status: ACTIVE | Noted: 2022-02-14

## 2022-02-14 LAB
ALBUMIN SERPL BCP-MCNC: 4 G/DL (ref 3.5–5.2)
ALP SERPL-CCNC: 181 U/L (ref 55–135)
ALT SERPL W/O P-5'-P-CCNC: 29 U/L (ref 10–44)
ANION GAP SERPL CALC-SCNC: 9 MMOL/L (ref 8–16)
ASCENDING AORTA: 4.14 CM
AST SERPL-CCNC: 34 U/L (ref 10–40)
AV INDEX (PROSTH): 0.47
AV MEAN GRADIENT: 10 MMHG
AV PEAK GRADIENT: 19 MMHG
AV VALVE AREA: 1.66 CM2
AV VELOCITY RATIO: 0.47
BASOPHILS # BLD AUTO: 0.03 K/UL (ref 0–0.2)
BASOPHILS NFR BLD: 0.4 % (ref 0–1.9)
BILIRUB SERPL-MCNC: 2 MG/DL (ref 0.1–1)
BNP SERPL-MCNC: 669 PG/ML (ref 0–99)
BSA FOR ECHO PROCEDURE: 2.39 M2
BUN SERPL-MCNC: 11 MG/DL (ref 8–23)
CALCIUM SERPL-MCNC: 9.4 MG/DL (ref 8.7–10.5)
CHLORIDE SERPL-SCNC: 91 MMOL/L (ref 95–110)
CO2 SERPL-SCNC: 32 MMOL/L (ref 23–29)
CREAT SERPL-MCNC: 0.8 MG/DL (ref 0.5–1.4)
CV ECHO LV RWT: 0.32 CM
DIFFERENTIAL METHOD: ABNORMAL
DOP CALC AO PEAK VEL: 2.19 M/S
DOP CALC AO VTI: 56.68 CM
DOP CALC LVOT AREA: 3.5 CM2
DOP CALC LVOT DIAMETER: 2.11 CM
DOP CALC LVOT PEAK VEL: 1.03 M/S
DOP CALC LVOT STROKE VOLUME: 93.87 CM3
DOP CALCLVOT PEAK VEL VTI: 26.86 CM
E WAVE DECELERATION TIME: 237.62 MSEC
E/A RATIO: 1.39
E/E' RATIO: 15.33 M/S
ECHO LV POSTERIOR WALL: 0.9 CM (ref 0.6–1.1)
EJECTION FRACTION: 65 %
EOSINOPHIL # BLD AUTO: 0.1 K/UL (ref 0–0.5)
EOSINOPHIL NFR BLD: 1.6 % (ref 0–8)
ERYTHROCYTE [DISTWIDTH] IN BLOOD BY AUTOMATED COUNT: 15.4 % (ref 11.5–14.5)
EST. GFR  (AFRICAN AMERICAN): >60 ML/MIN/1.73 M^2
EST. GFR  (NON AFRICAN AMERICAN): >60 ML/MIN/1.73 M^2
FRACTIONAL SHORTENING: 43 % (ref 28–44)
GLUCOSE SERPL-MCNC: 82 MG/DL (ref 70–110)
HCT VFR BLD AUTO: 44.7 % (ref 40–54)
HGB BLD-MCNC: 14.6 G/DL (ref 14–18)
IMM GRANULOCYTES # BLD AUTO: 0.04 K/UL (ref 0–0.04)
IMM GRANULOCYTES NFR BLD AUTO: 0.5 % (ref 0–0.5)
INTERVENTRICULAR SEPTUM: 0.98 CM (ref 0.6–1.1)
IVRT: 105.61 MSEC
LA MAJOR: 7 CM
LA MINOR: 6.24 CM
LA WIDTH: 4.11 CM
LEFT ATRIUM SIZE: 4.78 CM
LEFT ATRIUM VOLUME INDEX MOD: 47.4 ML/M2
LEFT ATRIUM VOLUME INDEX: 47.5 ML/M2
LEFT ATRIUM VOLUME MOD: 110 CM3
LEFT ATRIUM VOLUME: 110.18 CM3
LEFT INTERNAL DIMENSION IN SYSTOLE: 3.22 CM (ref 2.1–4)
LEFT VENTRICLE DIASTOLIC VOLUME INDEX: 67.18 ML/M2
LEFT VENTRICLE DIASTOLIC VOLUME: 155.85 ML
LEFT VENTRICLE MASS INDEX: 88 G/M2
LEFT VENTRICLE SYSTOLIC VOLUME INDEX: 18 ML/M2
LEFT VENTRICLE SYSTOLIC VOLUME: 41.69 ML
LEFT VENTRICULAR INTERNAL DIMENSION IN DIASTOLE: 5.63 CM (ref 3.5–6)
LEFT VENTRICULAR MASS: 204.53 G
LV LATERAL E/E' RATIO: 14.38 M/S
LV SEPTAL E/E' RATIO: 16.43 M/S
LYMPHOCYTES # BLD AUTO: 1.7 K/UL (ref 1–4.8)
LYMPHOCYTES NFR BLD: 21.7 % (ref 18–48)
MCH RBC QN AUTO: 30.2 PG (ref 27–31)
MCHC RBC AUTO-ENTMCNC: 32.7 G/DL (ref 32–36)
MCV RBC AUTO: 93 FL (ref 82–98)
MONOCYTES # BLD AUTO: 0.9 K/UL (ref 0.3–1)
MONOCYTES NFR BLD: 12.4 % (ref 4–15)
MV PEAK A VEL: 0.83 M/S
MV PEAK E VEL: 1.15 M/S
MV STENOSIS PRESSURE HALF TIME: 68.91 MS
MV VALVE AREA P 1/2 METHOD: 3.19 CM2
NEUTROPHILS # BLD AUTO: 4.8 K/UL (ref 1.8–7.7)
NEUTROPHILS NFR BLD: 63.4 % (ref 38–73)
NRBC BLD-RTO: 0 /100 WBC
PISA TR MAX VEL: 3.56 M/S
PLATELET # BLD AUTO: 259 K/UL (ref 150–450)
PMV BLD AUTO: 10.3 FL (ref 9.2–12.9)
POTASSIUM SERPL-SCNC: 4.8 MMOL/L (ref 3.5–5.1)
PROT SERPL-MCNC: 7.2 G/DL (ref 6–8.4)
PULM VEIN S/D RATIO: 0.67
PV PEAK D VEL: 0.73 M/S
PV PEAK S VEL: 0.49 M/S
RA MAJOR: 6.08 CM
RA PRESSURE: 15 MMHG
RA WIDTH: 4.15 CM
RBC # BLD AUTO: 4.83 M/UL (ref 4.6–6.2)
RIGHT VENTRICULAR END-DIASTOLIC DIMENSION: 4.25 CM
SINUS: 3.75 CM
SODIUM SERPL-SCNC: 132 MMOL/L (ref 136–145)
STJ: 3.17 CM
TDI LATERAL: 0.08 M/S
TDI SEPTAL: 0.07 M/S
TDI: 0.08 M/S
TR MAX PG: 51 MMHG
TRICUSPID ANNULAR PLANE SYSTOLIC EXCURSION: 2.3 CM
TROPONIN I SERPL DL<=0.01 NG/ML-MCNC: 0.02 NG/ML (ref 0–0.03)
TV REST PULMONARY ARTERY PRESSURE: 66 MMHG
WBC # BLD AUTO: 7.61 K/UL (ref 3.9–12.7)

## 2022-02-14 PROCEDURE — 93306 TTE W/DOPPLER COMPLETE: CPT | Mod: 26,,, | Performed by: INTERNAL MEDICINE

## 2022-02-14 PROCEDURE — 93005 ELECTROCARDIOGRAM TRACING: CPT

## 2022-02-14 PROCEDURE — 84484 ASSAY OF TROPONIN QUANT: CPT | Performed by: EMERGENCY MEDICINE

## 2022-02-14 PROCEDURE — 99285 EMERGENCY DEPT VISIT HI MDM: CPT | Mod: 25

## 2022-02-14 PROCEDURE — 83880 ASSAY OF NATRIURETIC PEPTIDE: CPT | Performed by: EMERGENCY MEDICINE

## 2022-02-14 PROCEDURE — 96376 TX/PRO/DX INJ SAME DRUG ADON: CPT

## 2022-02-14 PROCEDURE — 93010 ELECTROCARDIOGRAM REPORT: CPT | Mod: ,,, | Performed by: INTERNAL MEDICINE

## 2022-02-14 PROCEDURE — 80053 COMPREHEN METABOLIC PANEL: CPT | Performed by: EMERGENCY MEDICINE

## 2022-02-14 PROCEDURE — 63600175 PHARM REV CODE 636 W HCPCS: Performed by: EMERGENCY MEDICINE

## 2022-02-14 PROCEDURE — 93010 EKG 12-LEAD: ICD-10-PCS | Mod: ,,, | Performed by: INTERNAL MEDICINE

## 2022-02-14 PROCEDURE — 93306 TTE W/DOPPLER COMPLETE: CPT

## 2022-02-14 PROCEDURE — 93306 ECHO (CUPID ONLY): ICD-10-PCS | Mod: 26,,, | Performed by: INTERNAL MEDICINE

## 2022-02-14 PROCEDURE — G0378 HOSPITAL OBSERVATION PER HR: HCPCS

## 2022-02-14 PROCEDURE — U0002 COVID-19 LAB TEST NON-CDC: HCPCS | Performed by: EMERGENCY MEDICINE

## 2022-02-14 PROCEDURE — 85025 COMPLETE CBC W/AUTO DIFF WBC: CPT | Performed by: EMERGENCY MEDICINE

## 2022-02-14 PROCEDURE — 12000002 HC ACUTE/MED SURGE SEMI-PRIVATE ROOM

## 2022-02-14 RX ORDER — FUROSEMIDE 10 MG/ML
40 INJECTION INTRAMUSCULAR; INTRAVENOUS
Status: COMPLETED | OUTPATIENT
Start: 2022-02-14 | End: 2022-02-14

## 2022-02-14 RX ORDER — IBUPROFEN 200 MG
16 TABLET ORAL
Status: DISCONTINUED | OUTPATIENT
Start: 2022-02-15 | End: 2022-02-17 | Stop reason: HOSPADM

## 2022-02-14 RX ORDER — ACETAMINOPHEN 325 MG/1
650 TABLET ORAL EVERY 4 HOURS PRN
Status: DISCONTINUED | OUTPATIENT
Start: 2022-02-15 | End: 2022-02-17 | Stop reason: HOSPADM

## 2022-02-14 RX ORDER — BUDESONIDE AND FORMOTEROL FUMARATE DIHYDRATE 160; 4.5 UG/1; UG/1
2 AEROSOL RESPIRATORY (INHALATION) EVERY 12 HOURS
COMMUNITY
Start: 2022-01-14 | End: 2023-01-01 | Stop reason: SDUPTHER

## 2022-02-14 RX ORDER — IBUPROFEN 200 MG
24 TABLET ORAL
Status: DISCONTINUED | OUTPATIENT
Start: 2022-02-15 | End: 2022-02-17 | Stop reason: HOSPADM

## 2022-02-14 RX ORDER — ONDANSETRON 4 MG/1
4 TABLET, ORALLY DISINTEGRATING ORAL EVERY 8 HOURS PRN
Status: DISCONTINUED | OUTPATIENT
Start: 2022-02-15 | End: 2022-02-17 | Stop reason: HOSPADM

## 2022-02-14 RX ORDER — FAMCICLOVIR 500 MG/1
500 TABLET ORAL 3 TIMES DAILY
COMMUNITY
Start: 2022-01-08 | End: 2022-02-15

## 2022-02-14 RX ORDER — VALSARTAN 320 MG/1
320 TABLET ORAL DAILY
COMMUNITY
Start: 2021-12-23 | End: 2022-06-13 | Stop reason: SDUPTHER

## 2022-02-14 RX ORDER — FUROSEMIDE 10 MG/ML
40 INJECTION INTRAMUSCULAR; INTRAVENOUS
Status: DISCONTINUED | OUTPATIENT
Start: 2022-02-15 | End: 2022-02-15

## 2022-02-14 RX ORDER — METOLAZONE 2.5 MG/1
2.5 TABLET ORAL DAILY
Status: ON HOLD | COMMUNITY
Start: 2022-01-14 | End: 2022-02-17 | Stop reason: HOSPADM

## 2022-02-14 RX ORDER — POTASSIUM CHLORIDE 20 MEQ/1
20 TABLET, EXTENDED RELEASE ORAL DAILY
COMMUNITY
Start: 2022-02-04 | End: 2022-06-13 | Stop reason: SDUPTHER

## 2022-02-14 RX ORDER — MAG HYDROX/ALUMINUM HYD/SIMETH 200-200-20
30 SUSPENSION, ORAL (FINAL DOSE FORM) ORAL 4 TIMES DAILY PRN
Status: DISCONTINUED | OUTPATIENT
Start: 2022-02-15 | End: 2022-02-17 | Stop reason: HOSPADM

## 2022-02-14 RX ORDER — SODIUM CHLORIDE 0.9 % (FLUSH) 0.9 %
10 SYRINGE (ML) INJECTION
Status: DISCONTINUED | OUTPATIENT
Start: 2022-02-15 | End: 2022-02-17 | Stop reason: HOSPADM

## 2022-02-14 RX ORDER — GABAPENTIN 100 MG/1
CAPSULE ORAL
COMMUNITY
Start: 2022-01-08 | End: 2022-02-15 | Stop reason: ALTCHOICE

## 2022-02-14 RX ORDER — SODIUM CHLORIDE 0.9 % (FLUSH) 0.9 %
10 SYRINGE (ML) INJECTION EVERY 8 HOURS PRN
Status: DISCONTINUED | OUTPATIENT
Start: 2022-02-15 | End: 2022-02-17 | Stop reason: HOSPADM

## 2022-02-14 RX ORDER — GLUCAGON 1 MG
1 KIT INJECTION
Status: DISCONTINUED | OUTPATIENT
Start: 2022-02-15 | End: 2022-02-17 | Stop reason: HOSPADM

## 2022-02-14 RX ORDER — ENOXAPARIN SODIUM 100 MG/ML
40 INJECTION SUBCUTANEOUS EVERY 24 HOURS
Status: DISCONTINUED | OUTPATIENT
Start: 2022-02-15 | End: 2022-02-17 | Stop reason: HOSPADM

## 2022-02-14 RX ORDER — SIMETHICONE 80 MG
1 TABLET,CHEWABLE ORAL 4 TIMES DAILY PRN
Status: DISCONTINUED | OUTPATIENT
Start: 2022-02-15 | End: 2022-02-17 | Stop reason: HOSPADM

## 2022-02-14 RX ORDER — AMLODIPINE AND BENAZEPRIL HYDROCHLORIDE 5; 40 MG/1; MG/1
1 CAPSULE ORAL DAILY
COMMUNITY
Start: 2021-10-26 | End: 2022-02-15

## 2022-02-14 RX ORDER — BISOPROLOL FUMARATE 5 MG/1
5 TABLET, FILM COATED ORAL DAILY
COMMUNITY
Start: 2021-11-09 | End: 2022-03-23

## 2022-02-14 RX ORDER — BUMETANIDE 1 MG/1
1 TABLET ORAL DAILY
COMMUNITY
Start: 2021-12-09 | End: 2022-06-13 | Stop reason: SDUPTHER

## 2022-02-14 RX ORDER — VALSARTAN AND HYDROCHLOROTHIAZIDE 320; 25 MG/1; MG/1
TABLET, FILM COATED ORAL
COMMUNITY
Start: 2021-11-09 | End: 2022-02-15

## 2022-02-14 RX ORDER — TALC
6 POWDER (GRAM) TOPICAL NIGHTLY PRN
Status: DISCONTINUED | OUTPATIENT
Start: 2022-02-15 | End: 2022-02-17 | Stop reason: HOSPADM

## 2022-02-14 RX ADMIN — FUROSEMIDE 40 MG: 10 INJECTION, SOLUTION INTRAVENOUS at 09:02

## 2022-02-14 NOTE — Clinical Note
Is this patient a high probability for COVID-19?: No   Diagnosis: Acute diastolic congestive heart failure [488002]   Future Attending Provider: DIA PATINO [[id number]]   Admitting Provider:: DIA PATINO [[id number]]

## 2022-02-15 PROBLEM — I50.31 ACUTE DIASTOLIC CONGESTIVE HEART FAILURE: Status: ACTIVE | Noted: 2022-02-14

## 2022-02-15 PROBLEM — J44.9 COPD (CHRONIC OBSTRUCTIVE PULMONARY DISEASE): Status: ACTIVE | Noted: 2022-02-15

## 2022-02-15 LAB
ANION GAP SERPL CALC-SCNC: 11 MMOL/L (ref 8–16)
BUN SERPL-MCNC: 12 MG/DL (ref 8–23)
CALCIUM SERPL-MCNC: 9.3 MG/DL (ref 8.7–10.5)
CHLORIDE SERPL-SCNC: 91 MMOL/L (ref 95–110)
CO2 SERPL-SCNC: 32 MMOL/L (ref 23–29)
CREAT SERPL-MCNC: 0.7 MG/DL (ref 0.5–1.4)
CTP QC/QA: YES
EST. GFR  (AFRICAN AMERICAN): >60 ML/MIN/1.73 M^2
EST. GFR  (NON AFRICAN AMERICAN): >60 ML/MIN/1.73 M^2
GLUCOSE SERPL-MCNC: 77 MG/DL (ref 70–110)
MAGNESIUM SERPL-MCNC: 2 MG/DL (ref 1.6–2.6)
POCT GLUCOSE: 105 MG/DL (ref 70–110)
POCT GLUCOSE: 119 MG/DL (ref 70–110)
POCT GLUCOSE: 122 MG/DL (ref 70–110)
POCT GLUCOSE: 74 MG/DL (ref 70–110)
POTASSIUM SERPL-SCNC: 4.1 MMOL/L (ref 3.5–5.1)
SARS-COV-2 RDRP RESP QL NAA+PROBE: NEGATIVE
SODIUM SERPL-SCNC: 134 MMOL/L (ref 136–145)
TROPONIN I SERPL DL<=0.01 NG/ML-MCNC: 0.02 NG/ML (ref 0–0.03)
TSH SERPL DL<=0.005 MIU/L-ACNC: 0.45 UIU/ML (ref 0.4–4)

## 2022-02-15 PROCEDURE — 99223 1ST HOSP IP/OBS HIGH 75: CPT | Mod: ,,, | Performed by: NURSE PRACTITIONER

## 2022-02-15 PROCEDURE — 11000001 HC ACUTE MED/SURG PRIVATE ROOM

## 2022-02-15 PROCEDURE — 25000003 PHARM REV CODE 250: Performed by: NURSE PRACTITIONER

## 2022-02-15 PROCEDURE — 83735 ASSAY OF MAGNESIUM: CPT

## 2022-02-15 PROCEDURE — 63600175 PHARM REV CODE 636 W HCPCS: Performed by: NURSE PRACTITIONER

## 2022-02-15 PROCEDURE — 99900035 HC TECH TIME PER 15 MIN (STAT)

## 2022-02-15 PROCEDURE — 96376 TX/PRO/DX INJ SAME DRUG ADON: CPT

## 2022-02-15 PROCEDURE — 27000221 HC OXYGEN, UP TO 24 HOURS

## 2022-02-15 PROCEDURE — 63600175 PHARM REV CODE 636 W HCPCS

## 2022-02-15 PROCEDURE — 99223 PR INITIAL HOSPITAL CARE,LEVL III: ICD-10-PCS | Mod: ,,, | Performed by: NURSE PRACTITIONER

## 2022-02-15 PROCEDURE — 96365 THER/PROPH/DIAG IV INF INIT: CPT

## 2022-02-15 PROCEDURE — 25000242 PHARM REV CODE 250 ALT 637 W/ HCPCS

## 2022-02-15 PROCEDURE — 94640 AIRWAY INHALATION TREATMENT: CPT

## 2022-02-15 PROCEDURE — 63600175 PHARM REV CODE 636 W HCPCS: Performed by: HOSPITALIST

## 2022-02-15 PROCEDURE — 84484 ASSAY OF TROPONIN QUANT: CPT

## 2022-02-15 PROCEDURE — 84443 ASSAY THYROID STIM HORMONE: CPT

## 2022-02-15 PROCEDURE — 25000003 PHARM REV CODE 250

## 2022-02-15 PROCEDURE — 80048 BASIC METABOLIC PNL TOTAL CA: CPT

## 2022-02-15 RX ORDER — VALSARTAN 160 MG/1
320 TABLET ORAL DAILY
Status: DISCONTINUED | OUTPATIENT
Start: 2022-02-15 | End: 2022-02-17 | Stop reason: HOSPADM

## 2022-02-15 RX ORDER — FLUTICASONE FUROATE AND VILANTEROL 100; 25 UG/1; UG/1
1 POWDER RESPIRATORY (INHALATION) DAILY
Status: DISCONTINUED | OUTPATIENT
Start: 2022-02-15 | End: 2022-02-17 | Stop reason: HOSPADM

## 2022-02-15 RX ORDER — METOPROLOL TARTRATE 50 MG/1
50 TABLET ORAL 2 TIMES DAILY
Status: DISCONTINUED | OUTPATIENT
Start: 2022-02-15 | End: 2022-02-17 | Stop reason: HOSPADM

## 2022-02-15 RX ORDER — IPRATROPIUM BROMIDE AND ALBUTEROL SULFATE 2.5; .5 MG/3ML; MG/3ML
3 SOLUTION RESPIRATORY (INHALATION) EVERY 4 HOURS PRN
Status: DISCONTINUED | OUTPATIENT
Start: 2022-02-15 | End: 2022-02-17 | Stop reason: HOSPADM

## 2022-02-15 RX ADMIN — FUROSEMIDE 10 MG/HR: 10 INJECTION, SOLUTION INTRAMUSCULAR; INTRAVENOUS at 03:02

## 2022-02-15 RX ADMIN — METOPROLOL TARTRATE 50 MG: 50 TABLET, FILM COATED ORAL at 09:02

## 2022-02-15 RX ADMIN — METOPROLOL TARTRATE 50 MG: 50 TABLET, FILM COATED ORAL at 08:02

## 2022-02-15 RX ADMIN — FUROSEMIDE 40 MG: 40 INJECTION, SOLUTION INTRAMUSCULAR; INTRAVENOUS at 08:02

## 2022-02-15 RX ADMIN — FUROSEMIDE 10 MG/HR: 10 INJECTION, SOLUTION INTRAMUSCULAR; INTRAVENOUS at 09:02

## 2022-02-15 RX ADMIN — IPRATROPIUM BROMIDE AND ALBUTEROL SULFATE 3 ML: 2.5; .5 SOLUTION RESPIRATORY (INHALATION) at 01:02

## 2022-02-15 RX ADMIN — FLUTICASONE FUROATE AND VILANTEROL TRIFENATATE 1 PUFF: 100; 25 POWDER RESPIRATORY (INHALATION) at 08:02

## 2022-02-15 RX ADMIN — VALSARTAN 320 MG: 160 TABLET, FILM COATED ORAL at 08:02

## 2022-02-15 RX ADMIN — CEFTRIAXONE 1 G: 1 INJECTION, SOLUTION INTRAVENOUS at 03:02

## 2022-02-15 RX ADMIN — ENOXAPARIN SODIUM 40 MG: 100 INJECTION SUBCUTANEOUS at 06:02

## 2022-02-15 NOTE — ASSESSMENT & PLAN NOTE
-2/2 to fluid overload due to CHF exacerbation  -SpO2 88% on room air with increase work of breathing and tachypnea  -Continue with supplemental oxygen 1-5L for SpO2 <90%  -Duo-nebs for SOB/wheezing

## 2022-02-15 NOTE — HPI
Alexander Kelly is an 82 y/o male with chronic venous insufficiency, HTN, HFpEF, COPD, bilateral hearing loss, and obesity. Patient presented to the ED with SOB, CHUN, abdominal distension, LE edema. Patient has been eating ham/turkey sandwiches with chips, canned beef stew, and Iggy's on his food. Patient denies any chest pain, PND, diaphoresis. CXR c/w pulmonary edema. Troponin negative x 2. TTE with LVEF 65%, PH, DD, and enlarged IVC. Markedly overloaded on exam with JVD to his ear. Initiated Lasix gtt.

## 2022-02-15 NOTE — ASSESSMENT & PLAN NOTE
02/14/22    Echo    Interpretation Summary  · The left ventricle is normal in size with normal systolic function.  · The estimated ejection fraction is 65%.  · Grade II left ventricular diastolic dysfunction.  · The ascending aorta is mildly dilated.  · Mild right ventricular enlargement with normal right ventricular systolic function.  · Mild tricuspid regurgitation.  · The estimated PA systolic pressure is 66 mmHg.  · The IVC is enlarged, measuring >3cm. Elevated central venous pressure (15 mmHg).  · There is pulmonary hypertension.  · Biatrial enlargement.  · Moderate circumferential pericardial effusion with largest diameter posterolaterally at 1.5cm.  · There is a left pleural effusion.    Recent Labs   Lab 02/14/22 2007   *   .  ADHF in setting of dietary sodium indiscretion   Diuresing with Lasix gtt at 10 mg/hr  Continue BB and ARB

## 2022-02-15 NOTE — ASSESSMENT & PLAN NOTE
Body mass index is 33.47 kg/m². Morbid obesity complicates all aspects of disease management from diagnostic modalities to treatment.   Weight loss encouraged and health benefits explained to patient.

## 2022-02-15 NOTE — ED PROVIDER NOTES
Encounter Date: 2/14/2022    SCRIBE #1 NOTE: I, Cara Peña, am scribing for, and in the presence of, Bernardino Valadez MD.       History     Chief Complaint   Patient presents with    Shortness of Breath     Pt complains of SOB with CHUN, had blood work completed on 2/10 which showed RZ=807, and K= 5.2, pt alos has swelling noted to bilateral lower extremities      This is a 81 y.o. male who has a past medical history of Bilateral hearing loss and Hypertension.     The patient presents to the Emergency Department with shortness of breath with dyspnea on exertion that has been present for 1 week.   Patient reports getting ultrasound of chest today which found fluid.  Symptoms are associated with bilateral leg swelling.  Symptoms are aggravated by: exertion.   Patient notes he typically uses a walker to ambulate.  Patient has no prior history of similar symptoms.   His Cardiologist is Dr. Artur Healy and his PCP is Dr. Martin.    The history is provided by the patient.     Review of patient's allergies indicates:  No Known Allergies  Past Medical History:   Diagnosis Date    Bilateral hearing loss     Hypertension      No past surgical history on file.  Family History   Problem Relation Age of Onset    No Known Problems Mother     No Known Problems Father      Social History     Tobacco Use    Smoking status: Never Smoker    Smokeless tobacco: Never Used     Review of Systems   Constitutional: Negative for fever.   HENT: Negative for sore throat.    Respiratory: Positive for shortness of breath (dyspnea on exertion).    Cardiovascular: Positive for leg swelling. Negative for chest pain.   Gastrointestinal: Negative for constipation, diarrhea, nausea and vomiting.   Genitourinary: Negative for dysuria, frequency and urgency.   Musculoskeletal: Negative for back pain.   Skin: Negative for rash.   Neurological: Negative for headaches.   Hematological: Does not bruise/bleed easily.   Psychiatric/Behavioral:  Negative for agitation, behavioral problems and confusion.       Physical Exam     Initial Vitals [02/14/22 1837]   BP Pulse Resp Temp SpO2   (!) 191/84 64 (!) 22 97.6 °F (36.4 °C) 97 %      MAP       --         Physical Exam    Nursing note and vitals reviewed.  Constitutional: He appears well-developed and well-nourished. He is not diaphoretic. No distress.   HENT:   Head: Normocephalic and atraumatic.   Mouth/Throat: Oropharynx is clear and moist.   Eyes: Conjunctivae are normal. Pupils are equal, round, and reactive to light.   Neck: Neck supple.   Normal range of motion.  Cardiovascular: Normal rate, regular rhythm, normal heart sounds and intact distal pulses.   No murmur heard.  Pulmonary/Chest: No respiratory distress. He has rales (at bases bilaterally).   Abdominal: Abdomen is soft. Bowel sounds are normal. He exhibits no distension. There is no abdominal tenderness.   Musculoskeletal:      Cervical back: Normal range of motion and neck supple.      Comments: 3+ Pitting edema bilaterally up to thighs     Lymphadenopathy:     He has no cervical adenopathy.   Neurological: He is alert and oriented to person, place, and time. He has normal strength.   Skin: Skin is dry.   Cool to touch  Stasis ulcers noted to bilateral legs (1 cm diameter) that are clean with no surrounding erythema   Psychiatric: He has a normal mood and affect. Thought content normal.         ED Course   Procedures  Labs Reviewed   CBC W/ AUTO DIFFERENTIAL - Abnormal; Notable for the following components:       Result Value    RDW 15.4 (*)     All other components within normal limits   COMPREHENSIVE METABOLIC PANEL - Abnormal; Notable for the following components:    Sodium 132 (*)     Chloride 91 (*)     CO2 32 (*)     Total Bilirubin 2.0 (*)     Alkaline Phosphatase 181 (*)     All other components within normal limits   B-TYPE NATRIURETIC PEPTIDE - Abnormal; Notable for the following components:     (*)     All other components  within normal limits   BASIC METABOLIC PANEL - Abnormal; Notable for the following components:    Sodium 134 (*)     Chloride 91 (*)     CO2 32 (*)     All other components within normal limits    Narrative:     If not completed within last 6 months   POCT GLUCOSE - Abnormal; Notable for the following components:    POCT Glucose 119 (*)     All other components within normal limits   TROPONIN I   MAGNESIUM    Narrative:     If not completed within last 6 months   TSH    Narrative:     If not completed within last 6 months   TROPONIN I   SARS-COV-2 RDRP GENE    Narrative:     This test utilizes isothermal nucleic acid amplification   technology to detect the SARS-CoV-2 RdRp nucleic acid segment.   The analytical sensitivity (limit of detection) is 125 genome   equivalents/mL.   A POSITIVE result implies infection with the SARS-CoV-2 virus;   the patient is presumed to be contagious.     A NEGATIVE result means that SARS-CoV-2 nucleic acids are not   present above the limit of detection. A NEGATIVE result should be   treated as presumptive. It does not rule out the possibility of   COVID-19 and should not be the sole basis for treatment decisions.   If COVID-19 is strongly suspected based on clinical and exposure   history, re-testing using an alternate molecular assay should be   considered.   This test is only for use under the Food and Drug   Administration s Emergency Use Authorization (EUA).   Commercial kits are provided by [employer].   Performance characteristics of the EUA have been independently   verified by [facility] Department of   Pathology and Laboratory Medicine.   _________________________________________________________________   The authorized Fact Sheet for Healthcare Providers and the authorized Fact   Sheet for Patients of the ID NOW COVID-19 are available on the FDA   website:     https://www.fda.gov/media/832901/download  https://www.fda.gov/media/488591/download       POCT  GLUCOSE, HAND-HELD DEVICE   POCT GLUCOSE, HAND-HELD DEVICE   POCT GLUCOSE     EKG Readings: (Independently Interpreted)   Initial Reading: No STEMI.   EKG: NSR with sinus arrhythmia at 62 bpm, nl axis, nl intervals, no hypertrophy, no ST-T changes as read by me (Dr. Valadez).      ECG Results          EKG 12-lead (Final result)  Result time 02/15/22 08:37:26    Final result by Interface, Lab In [facility] (02/15/22 08:37:26)                 Narrative:    Test Reason : R06.02,    Vent. Rate : 062 BPM     Atrial Rate : 062 BPM     P-R Int : 168 ms          QRS Dur : 076 ms      QT Int : 394 ms       P-R-T Axes : 047 076 021 degrees     QTc Int : 399 ms    Normal sinus rhythm with sinus arrhythmia  Normal ECG  No previous ECGs available  Confirmed by Estephania EUBANKS, Mayur ESPINOSA ([id number]) on 2/15/2022 8:37:17 AM    Referred By: [email or web address]   SELF           Confirmed By:Mayur Best MD                            Imaging Results          US Lower Extremity Veins Bilateral (Final result)  Result time 02/15/22 10:32:47    Final result by Tristin Laird MD (02/15/22 10:32:47)                 Impression:      1. No findings of right or left lower extremity deep venous thrombophlebitis  2. Bilateral lower extremity soft tissue edema.  3. Prominent enlarged left groin lymph node.      Electronically signed by: Tristin Laird  Date:    02/15/2022  Time:    10:32             Narrative:    EXAMINATION:  US LOWER EXTREMITY VEINS BILATERAL    CLINICAL HISTORY:  Other specified soft tissue disorders    TECHNIQUE:  Duplex and color flow Doppler and dynamic compression was performed of the bilateral lower extremity veins was performed.    COMPARISON:  None    FINDINGS:  Graded compressive real-time color Doppler duplex sonographic evaluation of the bilateral lower extremity visualized deep veins accomplished with grayscale and spectral analysis.    Unremarkable evaluation seen with respect to right and left common femoral, femoral, popliteal,  posterior tibial, anterior tibial, peroneal, and more proximal greater saphenous veins.  Normal flow, compressibility, and augmentation.    Note made of soft tissue edema bilateral lower legs.  Note made of enlarged left groin lymph node with measurements of 1.2 x 4.2 x 3.7 cm.  Clinical correlation.                               X-Ray Chest AP Portable (Final result)  Result time 02/14/22 20:41:37    Final result by Filipe Rogers MD (02/14/22 20:41:37)                 Impression:      Findings suggesting vascular congestion/edema.  Left greater than right pleural effusions with bibasilar atelectasis.  Superimposed aspiration pneumonia not excluded.      Electronically signed by: Filipe Rogers  Date:    02/14/2022  Time:    20:41             Narrative:    EXAMINATION:  XR CHEST AP PORTABLE    CLINICAL HISTORY:  CHF;    TECHNIQUE:  Single frontal view of the chest was performed.    COMPARISON:  Chest radiograph performed 01/11/2019.    FINDINGS:  Dense opacification of the left mid lower lung zone which relates to pleural effusion with atelectasis, noting that superimposed aspiration ammonia difficult to exclude.  Similar but, less pronounced findings noted at the right lung base.    Prominent interstitial markings suggest vascular congestion/edema.    Enlargement of the cardiac silhouette is again suggested.    No definite pneumothorax.  No acute findings are suggested in the visualized abdomen.    Osseous and soft tissue structures appear without definite acute change                                 Medications   sodium chloride 0.9% flush 10 mL (has no administration in time range)   enoxaparin injection 40 mg (40 mg Subcutaneous Given 2/15/22 1802)   ondansetron disintegrating tablet 4 mg (has no administration in time range)   sodium chloride 0.9% flush 10 mL (has no administration in time range)   acetaminophen tablet 650 mg (has no administration in time range)   glucose chewable tablet 16 g (has no  administration in time range)   glucose chewable tablet 24 g (has no administration in time range)   glucagon (human recombinant) injection 1 mg (has no administration in time range)   melatonin tablet 6 mg (has no administration in time range)   simethicone chewable tablet 80 mg (has no administration in time range)   aluminum-magnesium hydroxide-simethicone 200-200-20 mg/5 mL suspension 30 mL (has no administration in time range)   dextrose 10% bolus 125 mL (has no administration in time range)   dextrose 10% bolus 250 mL (has no administration in time range)   metoprolol tartrate (LOPRESSOR) tablet 50 mg (50 mg Oral Given 2/15/22 2126)   fluticasone furoate-vilanteroL 100-25 mcg/dose diskus inhaler 1 puff (1 puff Inhalation Given 2/15/22 0844)   valsartan tablet 320 mg (320 mg Oral Given 2/15/22 0844)   albuterol-ipratropium 2.5 mg-0.5 mg/3 mL nebulizer solution 3 mL (3 mLs Nebulization Given 2/15/22 0125)   furosemide (LASIX) 200 mg in dextrose 5 % 100 mL continuous infusion (conc: 2 mg/mL) (10 mg/hr Intravenous New Bag 2/15/22 1536)   cefTRIAXone (ROCEPHIN) 1 g/50 mL D5W IVPB (0 g Intravenous Stopped 2/15/22 1530)   furosemide injection 40 mg (40 mg Intravenous Given 2/14/22 2147)       Medical Decision Making:   Initial Assessment:   This is an emergent evaluation of a 81 y.o.male patient with presentation of SOB, CHUN, bilat leg edema.     Initial differentials include but are not limited to: CHF, pneumonia, acs, PE considered less likely with adventitious breath sounds. Also pleural effusion    Plan: cbc, cmp, trop, bnp, ekg, cxr, lasix, contin pulse ox, cardiac monitoring.    Clinical Tests:   Lab Tests: Ordered and Reviewed  Radiological Study: Ordered and Reviewed  Medical Tests: Ordered and Reviewed    Patient persistently hypoxic, as low as 88% on room air.  Based on patient course and evaluation, I believe the patient has edema secondary to CHF.  Patient will need continued diuresis, cardiac monitoring  and evaluation of input/output.  Case discussed with Hospital Medicine.        Scribe Attestation:   Scribe #1: I performed the above scribed service and the documentation accurately describes the services I performed. I attest to the accuracy of the note.                   Clinical Impression:   Final diagnoses:  [R06.02] Shortness of breath  [I50.31] Acute diastolic congestive heart failure (Primary)  [M79.89] Leg swelling          ED Disposition Condition    Admit             I, Dr. Bernardino Valadez, personally performed the services described in this documentation. All medical record entries made by the scribe were at my direction and in my presence. I have reviewed the chart and agree that the record is accurate and complete.   Bernardino Valadez MD.       Bernardino Valadez MD  02/15/22 7038

## 2022-02-15 NOTE — ASSESSMENT & PLAN NOTE
Patient presents with shortness of breath and CHUN. Bibasilar rales and peripheral edema on exam  - consistent clinical presentation;   -CXR with findings suggesting vascular congestion/edema. Left greater than right pleural effusions with bibasilar atelectasis.   - initiated lasix 40mg IV in ED, then placed on Lasix drip  - will now deescalate to Lasix 60 mg IV  - continue home BB and ARB  - Daily weights  - Strict I/Os  - Monitor on telemetry  - Monitor and trend BMP, Mg, and renal function; keep K >4, Mg >2  - Sodium restriction (<2g/d), fluid restriction (1.5L)  - Echo with diastolic dysfunction, PHTN, EF 65% with moderate pleural effusion   - Monitor for signs of fluid overload: RR>30, O2 sat<92%, weight gain of >3 lbs in 24 hours, or urinary output <160ml/8hr  -Cardiology consulted, appreciate recs

## 2022-02-15 NOTE — HPI
Alexander Kelly is an 82 y/o male with PMHx HTN, H\FpEF, COPD, bilateral hearing loss, and obesity presents to [facility] ED from his doctor office for SOB and CHUN. Patient reports he has increased dyspnea on exertion, shortness of breath, and bilateral lower leg swelling. Patient reports he had chronic SOB, but the CHUN has been worsening since Thursday. Patient does not use home oxygen. Patient reports he had a CXR today and was told he has CHF and  to come be evaluated in the ED. Echo completed today PTA revealed diastolic dysfunction, PHTN, with moderate pericardial effusion and EF 65%. Patient denies chest pain, palpitations, nausea, or vomiting. Patient denies fevers or chills. Patient uses a walker to ambulate.In ED: Na 132, Chloride 91, CO2 32, . Troponin 0.016. CXR with Findings suggesting vascular congestion/edema. Left greater than right pleural effusions with bibasilar atelectasis. Cardiology consulted. Patient will be placed into hospital medicine observation services under my care in collaboration with Dr. Darrian Lawrence.

## 2022-02-15 NOTE — ASSESSMENT & PLAN NOTE
Patient presents with shortness of breath and CHUN. Bibasilar rales and peripheral edema on exam  - consistent clinical presentation;   -CXR with findings suggesting vascular congestion/edema. Left greater than right pleural effusions with bibasilar atelectasis.   - initiated lasix 40mg IV in ED, will continue at 40mg IV BID  - continue home BB and ARB  - Daily weights  -Strict I/Os  - Monitor on telemetry  - Monitor and trend BMP, Mg, and renal function; keep K >4, Mg >2  -Sodium restriction (<2g/d), fluid restriction (1.5L)  - Echo with diastolic dysfunction, PHTN, EF 65% with moderate pleural effusion   -Monitor for signs of fluid overload: RR>30, O2 sat<92%, weight gain of >3 lbs in 24 hours, or urinary output <160ml/8hr  -Cardiology consulted

## 2022-02-15 NOTE — SUBJECTIVE & OBJECTIVE
Past Medical History:   Diagnosis Date    Bilateral hearing loss     Hypertension        No past surgical history on file.    Review of patient's allergies indicates:  No Known Allergies    No current facility-administered medications on file prior to encounter.     Current Outpatient Medications on File Prior to Encounter   Medication Sig    bisoprolol (ZEBETA) 5 MG tablet 1 tablet    budesonide-formoterol 160-4.5 mcg (SYMBICORT) 160-4.5 mcg/actuation HFAA 2 puffs    bumetanide (BUMEX) 1 MG tablet 1 tablet    gabapentin (NEURONTIN) 100 MG capsule 1-2 capsules    metOLazone (ZAROXOLYN) 2.5 MG tablet 1 tablet    valsartan-hydrochlorothiazide (DIOVAN-HCT) 320-25 mg per tablet 1 tablet    albuterol (VENTOLIN HFA) 90 mcg/actuation inhaler Inhale 2 puffs into the lungs every 6 (six) hours as needed for Wheezing. Rescue    amLODIPine-benazepriL (LOTREL) 5-40 mg per capsule Take 1 capsule by mouth once daily.    famciclovir (FAMVIR) 500 MG tablet Take 500 mg by mouth 3 (three) times daily.    irbesartan (AVAPRO) 300 MG tablet     mupirocin (BACTROBAN) 2 % ointment Apply to affected area 3 times daily    potassium chloride SA (K-DUR,KLOR-CON) 20 MEQ tablet Take 20 mEq by mouth once daily.    promethazine-dextromethorphan (PROMETHAZINE-DM) 6.25-15 mg/5 mL Syrp Take 5 mLs by mouth nightly as needed.    torsemide (DEMADEX) 20 MG Tab     valsartan (DIOVAN) 320 MG tablet Take 320 mg by mouth once daily.     Family History     Problem Relation (Age of Onset)    No Known Problems Mother, Father        Tobacco Use    Smoking status: Never Smoker    Smokeless tobacco: Never Used   Substance and Sexual Activity    Alcohol use: Not on file    Drug use: Not on file    Sexual activity: Not on file     Review of Systems   Constitutional: Positive for weight gain. Negative for diaphoresis.   HENT: Negative.    Eyes: Negative.    Cardiovascular: Positive for dyspnea on exertion, leg swelling and orthopnea. Negative  for chest pain, irregular heartbeat, near-syncope, palpitations, paroxysmal nocturnal dyspnea and syncope.   Respiratory: Positive for shortness of breath and sleep disturbances due to breathing. Negative for cough.    Endocrine: Negative.    Hematologic/Lymphatic: Negative.    Skin: Positive for color change.   Musculoskeletal: Negative.    Gastrointestinal: Positive for bloating. Negative for nausea and vomiting.   Genitourinary: Negative.    Neurological: Negative.    Psychiatric/Behavioral: Negative.    Allergic/Immunologic: Negative.      Objective:     Vital Signs (Most Recent):  Temp: 97.5 °F (36.4 °C) (02/15/22 0600)  Pulse: 63 (02/15/22 0844)  Resp: (!) 25 (02/15/22 0844)  BP: (!) 176/78 (02/15/22 0823)  SpO2: (!) 94 % (02/15/22 0844) Vital Signs (24h Range):  Temp:  [97.5 °F (36.4 °C)-98.2 °F (36.8 °C)] 97.5 °F (36.4 °C)  Pulse:  [56-70] 63  Resp:  [17-26] 25  SpO2:  [86 %-98 %] 94 %  BP: (115-191)/(59-90) 176/78     Weight: 108.9 kg (240 lb)  Body mass index is 33.47 kg/m².    SpO2: (!) 94 %  O2 Device (Oxygen Therapy): nasal cannula      Intake/Output Summary (Last 24 hours) at 2/15/2022 1051  Last data filed at 2/15/2022 1035  Gross per 24 hour   Intake 480 ml   Output 3675 ml   Net -3195 ml       Lines/Drains/Airways     Peripheral Intravenous Line                 Peripheral IV - Single Lumen 20 G Left Antecubital -- days                Physical Exam  Constitutional:       General: He is not in acute distress.     Appearance: He is obese. He is not diaphoretic.   HENT:      Head: Atraumatic.   Eyes:      General:         Right eye: No discharge.         Left eye: No discharge.   Cardiovascular:      Rate and Rhythm: Normal rate and regular rhythm.      Heart sounds: Murmur heard.       Pulmonary:      Breath sounds: Rales present.   Abdominal:      General: There is distension.   Musculoskeletal:      Right lower leg: Edema present.      Left lower leg: Edema present.   Skin:     General: Skin is warm  and dry.      Capillary Refill: Capillary refill takes 2 to 3 seconds.   Neurological:      Mental Status: He is alert and oriented to person, place, and time.   Psychiatric:         Mood and Affect: Mood normal.         Behavior: Behavior normal.         Thought Content: Thought content normal.         Judgment: Judgment normal.         Significant Labs:   BMP:   Recent Labs   Lab 02/14/22  2007 02/15/22  0416   GLU 82 77   * 134*   K 4.8 4.1   CL 91* 91*   CO2 32* 32*   BUN 11 12   CREATININE 0.8 0.7   CALCIUM 9.4 9.3   MG  --  2.0   , CMP   Recent Labs   Lab 02/14/22  2007 02/15/22  0416   * 134*   K 4.8 4.1   CL 91* 91*   CO2 32* 32*   GLU 82 77   BUN 11 12   CREATININE 0.8 0.7   CALCIUM 9.4 9.3   PROT 7.2  --    ALBUMIN 4.0  --    BILITOT 2.0*  --    ALKPHOS 181*  --    AST 34  --    ALT 29  --    ANIONGAP 9 11   ESTGFRAFRICA >60 >60   EGFRNONAA >60 >60   , CBC   Recent Labs   Lab 02/14/22 2007   WBC 7.61   HGB 14.6   HCT 44.7      , INR No results for input(s): INR, PROTIME in the last 48 hours., Lipid Panel No results for input(s): CHOL, HDL, LDLCALC, TRIG, CHOLHDL in the last 48 hours., Troponin   Recent Labs   Lab 02/14/22  2007 02/15/22  0416   TROPONINI 0.016 0.016    and All pertinent lab results from the last 24 hours have been reviewed.    Significant Imaging: Echocardiogram:   Transthoracic echo (TTE) complete (Cupid Only):   Results for orders placed or performed during the hospital encounter of 02/14/22   Echo   Result Value Ref Range    Ascending aorta 4.14 cm    STJ 3.17 cm    AV mean gradient 10 mmHg    Ao peak celio 2.19 m/s    Ao VTI 56.68 cm    IVRT 105.61 msec    IVS 0.98 0.6 - 1.1 cm    LA size 4.78 cm    Left Atrium Major Axis 7.00 cm    Left Atrium Minor Axis 6.24 cm    LVIDd 5.63 3.5 - 6.0 cm    LVIDs 3.22 2.1 - 4.0 cm    LVOT diameter 2.11 cm    LVOT peak VTI 26.86 cm    Posterior Wall 0.90 0.6 - 1.1 cm    MV Peak A Celio 0.83 m/s    E wave deceleration time 237.62 msec     MV Peak E Celio 1.15 m/s    PV Peak D Celio 0.73 m/s    PV Peak S Celio 0.49 m/s    RA Major Axis 6.08 cm    RA Width 4.15 cm    RVDD 4.25 cm    Sinus 3.75 cm    TAPSE 2.30 cm    TR Max Celio 3.56 m/s    TDI LATERAL 0.08 m/s    TDI SEPTAL 0.07 m/s    LA WIDTH 4.11 cm    MV stenosis pressure 1/2 time 68.91 ms    LV Diastolic Volume 155.85 mL    LV Systolic Volume 41.69 mL    LVOT peak celio 1.03 m/s    LA volume (mod) 110.00 cm3    LV LATERAL E/E' RATIO 14.38 m/s    LV SEPTAL E/E' RATIO 16.43 m/s    FS 43 %    LA volume 110.18 cm3    LV mass 204.53 g    Left Ventricle Relative Wall Thickness 0.32 cm    AV valve area 1.66 cm2    AV Velocity Ratio 0.47     AV index (prosthetic) 0.47     MV valve area p 1/2 method 3.19 cm2    E/A ratio 1.39     Mean e' 0.08 m/s    Pulm vein S/D ratio 0.67     LVOT area 3.5 cm2    LVOT stroke volume 93.87 cm3    AV peak gradient 19 mmHg    E/E' ratio 15.33 m/s    Triscuspid Valve Regurgitation Peak Gradient 51 mmHg    BSA 2.39 m2    LV Systolic Volume Index 18.0 mL/m2    LV Diastolic Volume Index 67.18 mL/m2    LA Volume Index 47.5 mL/m2    LV Mass Index 88 g/m2    LA Volume Index (Mod) 47.4 mL/m2    Right Atrial Pressure (from IVC) 15 mmHg    EF 65 %    TV rest pulmonary artery pressure 66 mmHg    Narrative    · The left ventricle is normal in size with normal systolic function.  · The estimated ejection fraction is 65%.  · Grade II left ventricular diastolic dysfunction.  · The ascending aorta is mildly dilated.  · Mild right ventricular enlargement with normal right ventricular   systolic function.  · Mild tricuspid regurgitation.  · The estimated PA systolic pressure is 66 mmHg.  · The IVC is enlarged, measuring >3cm. Elevated central venous pressure   (15 mmHg).  · There is pulmonary hypertension.  · Biatrial enlargement.  · Moderate circumferential pericardial effusion with largest diameter   posterolaterally at 1.5cm.  · There is a left pleural effusion.

## 2022-02-15 NOTE — ED NOTES
Report received NELLIE Colindres. Care assumed. Pt AAOx4. Respirations even and unlabored. Pt VSS. No complaints of pain at this time. Condom cath placed without difficulty and  draining by gravity to bag. Will continue to monitor.

## 2022-02-15 NOTE — CONSULTS
[facility] - Emergency Dept  Cardiology  Consult Note    Patient Name: Alexander Kelly  MRN: [id number]  Admission Date: 2/14/2022  Hospital Length of Stay: 1 days  Code Status: Prior   Attending Provider: Miguel Rojo, *   Consulting Provider: Chevy Berman NP  Primary Care Physician: Niles Martin MD  Principal Problem:Acute respiratory failure with hypoxia and hypercarbia    Patient information was obtained from patient, past medical records and ER records.     Cardiology-[facility]  Consult performed by: Chevy Berman NP  Consult ordered by: Marci Quinn NP        Subjective:     Chief Complaint:  SOB, edema     HPI:   Alexander Kelly is an 82 y/o male with chronic venous insufficiency, HTN, HFpEF, COPD, bilateral hearing loss, and obesity. Patient presented to the ED with SOB, CHUN, abdominal distension, LE edema. Patient has been eating ham/turkey sandwiches with chips, canned beef stew, and Iggy's on his food. Patient denies any chest pain, PND, diaphoresis. CXR c/w pulmonary edema. Troponin negative x 2. TTE with LVEF 65%, PH, DD, and enlarged IVC. Markedly overloaded on exam with JVD to his ear. Initiated Lasix gtt.         Past Medical History:   Diagnosis Date    Bilateral hearing loss     Hypertension        No past surgical history on file.    Review of patient's allergies indicates:  No Known Allergies    No current facility-administered medications on file prior to encounter.     Current Outpatient Medications on File Prior to Encounter   Medication Sig    bisoprolol (ZEBETA) 5 MG tablet 1 tablet    budesonide-formoterol 160-4.5 mcg (SYMBICORT) 160-4.5 mcg/actuation HFAA 2 puffs    bumetanide (BUMEX) 1 MG tablet 1 tablet    gabapentin (NEURONTIN) 100 MG capsule 1-2 capsules    metOLazone (ZAROXOLYN) 2.5 MG tablet 1 tablet    valsartan-hydrochlorothiazide (DIOVAN-HCT) 320-25 mg per tablet 1 tablet    albuterol (VENTOLIN HFA) 90 mcg/actuation inhaler Inhale 2 puffs into the  lungs every 6 (six) hours as needed for Wheezing. Rescue    amLODIPine-benazepriL (LOTREL) 5-40 mg per capsule Take 1 capsule by mouth once daily.    famciclovir (FAMVIR) 500 MG tablet Take 500 mg by mouth 3 (three) times daily.    irbesartan (AVAPRO) 300 MG tablet     mupirocin (BACTROBAN) 2 % ointment Apply to affected area 3 times daily    potassium chloride SA (K-DUR,KLOR-CON) 20 MEQ tablet Take 20 mEq by mouth once daily.    promethazine-dextromethorphan (PROMETHAZINE-DM) 6.25-15 mg/5 mL Syrp Take 5 mLs by mouth nightly as needed.    torsemide (DEMADEX) 20 MG Tab     valsartan (DIOVAN) 320 MG tablet Take 320 mg by mouth once daily.     Family History     Problem Relation (Age of Onset)    No Known Problems Mother, Father        Tobacco Use    Smoking status: Never Smoker    Smokeless tobacco: Never Used   Substance and Sexual Activity    Alcohol use: Not on file    Drug use: Not on file    Sexual activity: Not on file     Review of Systems   Constitutional: Positive for weight gain. Negative for diaphoresis.   HENT: Negative.    Eyes: Negative.    Cardiovascular: Positive for dyspnea on exertion, leg swelling and orthopnea. Negative for chest pain, irregular heartbeat, near-syncope, palpitations, paroxysmal nocturnal dyspnea and syncope.   Respiratory: Positive for shortness of breath and sleep disturbances due to breathing. Negative for cough.    Endocrine: Negative.    Hematologic/Lymphatic: Negative.    Skin: Positive for color change.   Musculoskeletal: Negative.    Gastrointestinal: Positive for bloating. Negative for nausea and vomiting.   Genitourinary: Negative.    Neurological: Negative.    Psychiatric/Behavioral: Negative.    Allergic/Immunologic: Negative.      Objective:     Vital Signs (Most Recent):  Temp: 97.5 °F (36.4 °C) (02/15/22 0600)  Pulse: 63 (02/15/22 0844)  Resp: (!) 25 (02/15/22 0844)  BP: (!) 176/78 (02/15/22 0823)  SpO2: (!) 94 % (02/15/22 0844) Vital Signs (24h  Range):  Temp:  [97.5 °F (36.4 °C)-98.2 °F (36.8 °C)] 97.5 °F (36.4 °C)  Pulse:  [56-70] 63  Resp:  [17-26] 25  SpO2:  [86 %-98 %] 94 %  BP: (115-191)/(59-90) 176/78     Weight: 108.9 kg (240 lb)  Body mass index is 33.47 kg/m².    SpO2: (!) 94 %  O2 Device (Oxygen Therapy): nasal cannula      Intake/Output Summary (Last 24 hours) at 2/15/2022 1051  Last data filed at 2/15/2022 1035  Gross per 24 hour   Intake 480 ml   Output 3675 ml   Net -3195 ml       Lines/Drains/Airways     Peripheral Intravenous Line                 Peripheral IV - Single Lumen 20 G Left Antecubital -- days                Physical Exam  Constitutional:       General: He is not in acute distress.     Appearance: He is obese. He is not diaphoretic.   HENT:      Head: Atraumatic.   Eyes:      General:         Right eye: No discharge.         Left eye: No discharge.   Cardiovascular:      Rate and Rhythm: Normal rate and regular rhythm.      Heart sounds: Murmur heard.       Pulmonary:      Breath sounds: Rales present.   Abdominal:      General: There is distension.   Musculoskeletal:      Right lower leg: Edema present.      Left lower leg: Edema present.   Skin:     General: Skin is warm and dry.      Capillary Refill: Capillary refill takes 2 to 3 seconds.   Neurological:      Mental Status: He is alert and oriented to person, place, and time.   Psychiatric:         Mood and Affect: Mood normal.         Behavior: Behavior normal.         Thought Content: Thought content normal.         Judgment: Judgment normal.         Significant Labs:   BMP:   Recent Labs   Lab 02/14/22  2007 02/15/22  0416   GLU 82 77   * 134*   K 4.8 4.1   CL 91* 91*   CO2 32* 32*   BUN 11 12   CREATININE 0.8 0.7   CALCIUM 9.4 9.3   MG  --  2.0   , CMP   Recent Labs   Lab 02/14/22  2007 02/15/22  0416   * 134*   K 4.8 4.1   CL 91* 91*   CO2 32* 32*   GLU 82 77   BUN 11 12   CREATININE 0.8 0.7   CALCIUM 9.4 9.3   PROT 7.2  --    ALBUMIN 4.0  --    BILITOT 2.0*   --    ALKPHOS 181*  --    AST 34  --    ALT 29  --    ANIONGAP 9 11   ESTGFRAFRICA >60 >60   EGFRNONAA >60 >60   , CBC   Recent Labs   Lab 02/14/22 2007   WBC 7.61   HGB 14.6   HCT 44.7      , INR No results for input(s): INR, PROTIME in the last 48 hours., Lipid Panel No results for input(s): CHOL, HDL, LDLCALC, TRIG, CHOLHDL in the last 48 hours., Troponin   Recent Labs   Lab 02/14/22  2007 02/15/22  0416   TROPONINI 0.016 0.016    and All pertinent lab results from the last 24 hours have been reviewed.    Significant Imaging: Echocardiogram:   Transthoracic echo (TTE) complete (Cupid Only):   Results for orders placed or performed during the hospital encounter of 02/14/22   Echo   Result Value Ref Range    Ascending aorta 4.14 cm    STJ 3.17 cm    AV mean gradient 10 mmHg    Ao peak celio 2.19 m/s    Ao VTI 56.68 cm    IVRT 105.61 msec    IVS 0.98 0.6 - 1.1 cm    LA size 4.78 cm    Left Atrium Major Axis 7.00 cm    Left Atrium Minor Axis 6.24 cm    LVIDd 5.63 3.5 - 6.0 cm    LVIDs 3.22 2.1 - 4.0 cm    LVOT diameter 2.11 cm    LVOT peak VTI 26.86 cm    Posterior Wall 0.90 0.6 - 1.1 cm    MV Peak A Celio 0.83 m/s    E wave deceleration time 237.62 msec    MV Peak E Celio 1.15 m/s    PV Peak D Celio 0.73 m/s    PV Peak S Celio 0.49 m/s    RA Major Axis 6.08 cm    RA Width 4.15 cm    RVDD 4.25 cm    Sinus 3.75 cm    TAPSE 2.30 cm    TR Max Celio 3.56 m/s    TDI LATERAL 0.08 m/s    TDI SEPTAL 0.07 m/s    LA WIDTH 4.11 cm    MV stenosis pressure 1/2 time 68.91 ms    LV Diastolic Volume 155.85 mL    LV Systolic Volume 41.69 mL    LVOT peak celio 1.03 m/s    LA volume (mod) 110.00 cm3    LV LATERAL E/E' RATIO 14.38 m/s    LV SEPTAL E/E' RATIO 16.43 m/s    FS 43 %    LA volume 110.18 cm3    LV mass 204.53 g    Left Ventricle Relative Wall Thickness 0.32 cm    AV valve area 1.66 cm2    AV Velocity Ratio 0.47     AV index (prosthetic) 0.47     MV valve area p 1/2 method 3.19 cm2    E/A ratio 1.39     Mean e' 0.08 m/s    Pulm vein  S/D ratio 0.67     LVOT area 3.5 cm2    LVOT stroke volume 93.87 cm3    AV peak gradient 19 mmHg    E/E' ratio 15.33 m/s    Triscuspid Valve Regurgitation Peak Gradient 51 mmHg    BSA 2.39 m2    LV Systolic Volume Index 18.0 mL/m2    LV Diastolic Volume Index 67.18 mL/m2    LA Volume Index 47.5 mL/m2    LV Mass Index 88 g/m2    LA Volume Index (Mod) 47.4 mL/m2    Right Atrial Pressure (from IVC) 15 mmHg    EF 65 %    TV rest pulmonary artery pressure 66 mmHg    Narrative    · The left ventricle is normal in size with normal systolic function.  · The estimated ejection fraction is 65%.  · Grade II left ventricular diastolic dysfunction.  · The ascending aorta is mildly dilated.  · Mild right ventricular enlargement with normal right ventricular   systolic function.  · Mild tricuspid regurgitation.  · The estimated PA systolic pressure is 66 mmHg.  · The IVC is enlarged, measuring >3cm. Elevated central venous pressure   (15 mmHg).  · There is pulmonary hypertension.  · Biatrial enlargement.  · Moderate circumferential pericardial effusion with largest diameter   posterolaterally at 1.5cm.  · There is a left pleural effusion.        Assessment and Plan:     Bilateral pleural effusion  Expect improvement with diuresis     Acute diastolic congestive heart failure   02/14/22    Echo    Interpretation Summary  · The left ventricle is normal in size with normal systolic function.  · The estimated ejection fraction is 65%.  · Grade II left ventricular diastolic dysfunction.  · The ascending aorta is mildly dilated.  · Mild right ventricular enlargement with normal right ventricular systolic function.  · Mild tricuspid regurgitation.  · The estimated PA systolic pressure is 66 mmHg.  · The IVC is enlarged, measuring >3cm. Elevated central venous pressure (15 mmHg).  · There is pulmonary hypertension.  · Biatrial enlargement.  · Moderate circumferential pericardial effusion with largest diameter posterolaterally at 1.5cm.  ·  There is a left pleural effusion.    Recent Labs   Lab 02/14/22 2007   *   .  ADHF in setting of dietary sodium indiscretion   Diuresing with Lasix gtt at 10 mg/hr  Continue BB and ARB     Obesity (BMI 30-39.9)  Weight loss encouraged     Venous stasis dermatitis of both lower extremities  Chronic and stable        VTE Risk Mitigation (From admission, onward)         Ordered     enoxaparin injection 40 mg  Daily         02/14/22 2331     IP VTE HIGH RISK PATIENT  Once         02/14/22 2331     Place sequential compression device  Until discontinued         02/14/22 2331                Thank you for your consult. I will follow-up with patient. Please contact us if you have any additional questions.    Chevy Berman NP  Cardiology   [place] - Emergency Dept

## 2022-02-15 NOTE — H&P
[facility] Emergency Dept  [facility] Medicine  History & Physical    Patient Name: Alexander Kelly  MRN: [id number]  Patient Class: OP- Observation  Admission Date: 2/14/2022  Attending Physician: Darrian Lawrence MD   Primary Care Provider: Niles Martin MD         Patient information was obtained from patient, spouse/SO and ER records.     Subjective:     Principal Problem:Acute respiratory failure with hypoxia and hypercarbia    Chief Complaint:   Chief Complaint   Patient presents with    Shortness of Breath     Pt complains of SOB with CHUN, had blood work completed on 2/10 which showed EI=804, and K= 5.2, pt alos has swelling noted to bilateral lower extremities         HPI: Alexander Kelly is an 80 y/o male with PMHx HTN, H\FpEF, COPD, bilateral hearing loss, and obesity presents to [facility] ED from his doctor office for SOB and CHUN. Patient reports he has increased dyspnea on exertion, shortness of breath, and bilateral lower leg swelling. Patient reports he had chronic SOB, but the CHUN has been worsening since Thursday. Patient does not use home oxygen. Patient reports he had a CXR today and was told he has CHF and  to come be evaluated in the ED. Echo completed today PTA revealed diastolic dysfunction, PHTN, with moderate pericardial effusion and EF 65%. Patient denies chest pain, palpitations, nausea, or vomiting. Patient denies fevers or chills. Patient uses a walker to ambulate.In ED: Na 132, Chloride 91, CO2 32, . Troponin 0.016. CXR with Findings suggesting vascular congestion/edema. Left greater than right pleural effusions with bibasilar atelectasis. Cardiology consulted. Patient will be placed into hospital medicine observation services under my care in collaboration with Dr. Darrian Lawrecne.         Past Medical History:   Diagnosis Date    Bilateral hearing loss     Hypertension        No past surgical history on file.    Review of patient's allergies indicates:  No Known Allergies    No current  facility-administered medications on file prior to encounter.     Current Outpatient Medications on File Prior to Encounter   Medication Sig    bisoprolol (ZEBETA) 5 MG tablet 1 tablet    budesonide-formoterol 160-4.5 mcg (SYMBICORT) 160-4.5 mcg/actuation HFAA 2 puffs    bumetanide (BUMEX) 1 MG tablet 1 tablet    gabapentin (NEURONTIN) 100 MG capsule 1-2 capsules    metOLazone (ZAROXOLYN) 2.5 MG tablet 1 tablet    valsartan-hydrochlorothiazide (DIOVAN-HCT) 320-25 mg per tablet 1 tablet    albuterol (VENTOLIN HFA) 90 mcg/actuation inhaler Inhale 2 puffs into the lungs every 6 (six) hours as needed for Wheezing. Rescue    amLODIPine-benazepriL (LOTREL) 5-40 mg per capsule Take 1 capsule by mouth once daily.    famciclovir (FAMVIR) 500 MG tablet Take 500 mg by mouth 3 (three) times daily.    irbesartan (AVAPRO) 300 MG tablet     mupirocin (BACTROBAN) 2 % ointment Apply to affected area 3 times daily    potassium chloride SA (K-DUR,KLOR-CON) 20 MEQ tablet Take 20 mEq by mouth once daily.    promethazine-dextromethorphan (PROMETHAZINE-DM) 6.25-15 mg/5 mL Syrp Take 5 mLs by mouth nightly as needed.    torsemide (DEMADEX) 20 MG Tab     valsartan (DIOVAN) 320 MG tablet Take 320 mg by mouth once daily.     Family History     Problem Relation (Age of Onset)    No Known Problems Mother, Father        Tobacco Use    Smoking status: Never Smoker    Smokeless tobacco: Never Used   Substance and Sexual Activity    Alcohol use: Not on file    Drug use: Not on file    Sexual activity: Not on file     Review of Systems   Constitutional: Positive for fatigue. Negative for chills, diaphoresis and fever.   HENT: Positive for hearing loss. Negative for sore throat.    Eyes: Negative for visual disturbance.   Respiratory: Positive for shortness of breath. Negative for cough and wheezing.    Cardiovascular: Positive for leg swelling. Negative for chest pain.        CHUN   Gastrointestinal: Negative for abdominal pain,  diarrhea, nausea and vomiting.   Genitourinary: Negative for difficulty urinating and flank pain.   Musculoskeletal: Negative for back pain.   Skin: Positive for color change and wound. Negative for rash.   Neurological: Negative for dizziness, weakness and headaches.   Psychiatric/Behavioral: Negative for agitation and confusion.     Objective:     Vital Signs (Most Recent):  Temp: 97.8 °F (36.6 °C) (02/14/22 2157)  Pulse: 67 (02/14/22 2157)  Resp: (!) 24 (02/14/22 2157)  BP: (!) 176/90 (02/14/22 2157)  SpO2: (!) 92 % (02/14/22 2157) Vital Signs (24h Range):  Temp:  [97.6 °F (36.4 °C)-97.8 °F (36.6 °C)] 97.8 °F (36.6 °C)  Pulse:  [64-68] 67  Resp:  [22-24] 24  SpO2:  [92 %-97 %] 92 %  BP: (170-191)/(84-90) 176/90     Weight: 108.9 kg (240 lb)  Body mass index is 33.47 kg/m².    Physical Exam  Vitals and nursing note reviewed.   Constitutional:       General: He is not in acute distress.     Appearance: Normal appearance. He is ill-appearing.      Interventions: Nasal cannula in place.      Comments: 2L   HENT:      Head: Normocephalic and atraumatic.      Right Ear: No decreased hearing noted.      Left Ear: No decreased hearing noted.      Ears:      Comments: Bilateral hearing aids present      Mouth/Throat:      Mouth: Mucous membranes are dry.   Eyes:      Extraocular Movements: Extraocular movements intact.      Pupils: Pupils are equal, round, and reactive to light.   Cardiovascular:      Rate and Rhythm: Normal rate and regular rhythm.      Pulses: Normal pulses.      Heart sounds: Normal heart sounds. No murmur heard.  No friction rub. No gallop.    Pulmonary:      Effort: Tachypnea and accessory muscle usage present. No respiratory distress.      Breath sounds: Examination of the right-middle field reveals rhonchi. Examination of the left-middle field reveals rhonchi. Examination of the right-lower field reveals decreased breath sounds. Examination of the left-lower field reveals decreased breath sounds.  Decreased breath sounds and rhonchi present. No wheezing.      Comments: Mild expiratory wheeze appreciated  Abdominal:      General: Bowel sounds are normal. There is no distension.      Palpations: Abdomen is soft.      Tenderness: There is no abdominal tenderness. There is no guarding.   Musculoskeletal:         General: No swelling.      Cervical back: Normal range of motion and neck supple.      Right lower le+ Edema present.      Left lower le+ Edema present.   Skin:     General: Skin is warm and dry.      Capillary Refill: Capillary refill takes less than 2 seconds.      Findings: Erythema and wound present. No bruising or rash.      Comments: Ulcers noted L and R lower extremity   Erythema noted to bilateral hands (report chronic)   Neurological:      General: No focal deficit present.      Mental Status: He is alert and oriented to person, place, and time.      GCS: GCS eye subscore is 4. GCS verbal subscore is 4. GCS motor subscore is 6.   Psychiatric:         Mood and Affect: Mood normal.         Behavior: Behavior normal. Behavior is cooperative.           CRANIAL NERVES     CN III, IV, VI   Pupils are equal, round, and reactive to light.       Significant Labs: All pertinent labs within the past 24 hours have been reviewed.    Significant Imaging: I have reviewed all pertinent imaging results/findings within the past 24 hours.    Assessment/Plan:     * Acute respiratory failure with hypoxia and hypercarbia  -2/2 to fluid overload due to CHF exacerbation; not on home oxygen   -SpO2 88% on room air with increase work of breathing, tachypnea, abdominal muscle use  -Continue with supplemental oxygen 1-5L for SpO2 <90%  -Duo-nebs for SOB/wheezing      Acute diastolic congestive heart failure  Patient presents with shortness of breath and CHUN. Bibasilar rales and peripheral edema on exam  - consistent clinical presentation;   -CXR with findings suggesting vascular congestion/edema. Left greater  than right pleural effusions with bibasilar atelectasis.   - initiated lasix 40mg IV in ED, will continue at 40mg IV BID  - continue home BB and ARB  - Daily weights  -Strict I/Os  - Monitor on telemetry  - Monitor and trend BMP, Mg, and renal function; keep K >4, Mg >2  -Sodium restriction (<2g/d), fluid restriction (1.5L)  - Echo with diastolic dysfunction, PHTN, EF 65% with moderate pleural effusion   -Monitor for signs of fluid overload: RR>30, O2 sat<92%, weight gain of >3 lbs in 24 hours, or urinary output <160ml/8hr  -Obtain TSH  -Cardiology consulted            COPD (chronic obstructive pulmonary disease)  Clinically-stable with mild expiratory wheezing. Afebrile. No Elevation of WBC, Non-Productive Cough.  --Will continue home regimen of symbicort  --PRN Oxygen per Nasal Cannula for SpO2 <88%  --Pulse-Ox Monitoring every 4 hours  --Monitor respiratory status closely  --duo nebs PRN for sob/wheezing        Bilateral pleural effusion  -CXR with evidence of Left greater than right pleural effusions with bibasilar atelectasis and vascular congestion  -Received 40mg lasix x1 in ED  -Continue Lasix 40mg IV BID  -Supplemental oxygen for SpO2 <90%        Pulmonary hypertension  -Noted on echo today  -Continue with diuresis  -Cardiology consulted        Obesity (BMI 30-39.9)  Body mass index is 33.47 kg/m². Morbid obesity complicates all aspects of disease management from diagnostic modalities to treatment.   Weight loss encouraged and health benefits explained to patient.        SOB (shortness of breath)  -present to ED with increasing SOB and CHUN  -CXR revealed vascular congestion and bilateral pleural effusion  -Received lasix in ED  -Will continue to diurese patient with lasix BID  -Supplemental oxygen PRN        Venous stasis dermatitis of both lower extremities  -presents with SOB, CHUN, and BLE edema  -BLE with erythema and discoloration  -Venous US pending  -Continue to diurese patient with Lasix 40mg IV  BID  -Wound care consult        VTE Risk Mitigation (From admission, onward)         Ordered     enoxaparin injection 40 mg  Daily         02/14/22 2331     IP VTE HIGH RISK PATIENT  Once         02/14/22 2331     Place sequential compression device  Until discontinued         02/14/22 2331                   Marci Quinn DNP, ACN-AG, CCRN  Hospitalist  Department of Hospital Medicine  [facility]   [phone]

## 2022-02-15 NOTE — ASSESSMENT & PLAN NOTE
-present to ED with increasing SOB and CHUN  -CXR revealed vascular congestion and bilateral pleural effusion  -Received lasix in ED  -Will continue to diurese patient with lasix BID  -Supplemental oxygen PRN

## 2022-02-15 NOTE — ED NOTES
Provider Dr Rojo @bedside. MD turned pt O2 off. Pt O2 saturation remained at 97% on room air. Well continue to monitor.

## 2022-02-15 NOTE — HOSPITAL COURSE
02/15/2022 Per HPI   02/16/2022 100 cc intake 10.7L output, net -11.9L since admission. Lasix gtt discontinued and will given Lasix 60 mg IV x 1 then hold. Volume status improved. Hemodynamically stable.   02/17/2022 No SOB reported at rest. Diuresed well. Re-enforced dietary sodium restrictions. Bumex 1 mg BID initiated. Will ambulate.

## 2022-02-15 NOTE — SUBJECTIVE & OBJECTIVE
Past Medical History:   Diagnosis Date    Bilateral hearing loss     Hypertension        No past surgical history on file.    Review of patient's allergies indicates:  No Known Allergies    No current facility-administered medications on file prior to encounter.     Current Outpatient Medications on File Prior to Encounter   Medication Sig    bisoprolol (ZEBETA) 5 MG tablet 1 tablet    budesonide-formoterol 160-4.5 mcg (SYMBICORT) 160-4.5 mcg/actuation HFAA 2 puffs    bumetanide (BUMEX) 1 MG tablet 1 tablet    gabapentin (NEURONTIN) 100 MG capsule 1-2 capsules    metOLazone (ZAROXOLYN) 2.5 MG tablet 1 tablet    valsartan-hydrochlorothiazide (DIOVAN-HCT) 320-25 mg per tablet 1 tablet    albuterol (VENTOLIN HFA) 90 mcg/actuation inhaler Inhale 2 puffs into the lungs every 6 (six) hours as needed for Wheezing. Rescue    amLODIPine-benazepriL (LOTREL) 5-40 mg per capsule Take 1 capsule by mouth once daily.    famciclovir (FAMVIR) 500 MG tablet Take 500 mg by mouth 3 (three) times daily.    irbesartan (AVAPRO) 300 MG tablet     mupirocin (BACTROBAN) 2 % ointment Apply to affected area 3 times daily    potassium chloride SA (K-DUR,KLOR-CON) 20 MEQ tablet Take 20 mEq by mouth once daily.    promethazine-dextromethorphan (PROMETHAZINE-DM) 6.25-15 mg/5 mL Syrp Take 5 mLs by mouth nightly as needed.    torsemide (DEMADEX) 20 MG Tab     valsartan (DIOVAN) 320 MG tablet Take 320 mg by mouth once daily.     Family History     Problem Relation (Age of Onset)    No Known Problems Mother, Father        Tobacco Use    Smoking status: Never Smoker    Smokeless tobacco: Never Used   Substance and Sexual Activity    Alcohol use: Not on file    Drug use: Not on file    Sexual activity: Not on file     Review of Systems   Constitutional: Positive for fatigue. Negative for chills, diaphoresis and fever.   HENT: Positive for hearing loss. Negative for sore throat.    Eyes: Negative for visual disturbance.    Respiratory: Positive for shortness of breath. Negative for cough and wheezing.    Cardiovascular: Positive for leg swelling. Negative for chest pain.        CHUN   Gastrointestinal: Negative for abdominal pain, diarrhea, nausea and vomiting.   Genitourinary: Negative for difficulty urinating and flank pain.   Musculoskeletal: Negative for back pain.   Skin: Positive for color change and wound. Negative for rash.   Neurological: Negative for dizziness, weakness and headaches.   Psychiatric/Behavioral: Negative for agitation and confusion.     Objective:     Vital Signs (Most Recent):  Temp: 97.8 °F (36.6 °C) (02/14/22 2157)  Pulse: 67 (02/14/22 2157)  Resp: (!) 24 (02/14/22 2157)  BP: (!) 176/90 (02/14/22 2157)  SpO2: (!) 92 % (02/14/22 2157) Vital Signs (24h Range):  Temp:  [97.6 °F (36.4 °C)-97.8 °F (36.6 °C)] 97.8 °F (36.6 °C)  Pulse:  [64-68] 67  Resp:  [22-24] 24  SpO2:  [92 %-97 %] 92 %  BP: (170-191)/(84-90) 176/90     Weight: 108.9 kg (240 lb)  Body mass index is 33.47 kg/m².    Physical Exam  Vitals and nursing note reviewed.   Constitutional:       General: He is not in acute distress.     Appearance: Normal appearance. He is ill-appearing.      Interventions: Nasal cannula in place.      Comments: 2L   HENT:      Head: Normocephalic and atraumatic.      Right Ear: No decreased hearing noted.      Left Ear: No decreased hearing noted.      Ears:      Comments: Bilateral hearing aids present      Mouth/Throat:      Mouth: Mucous membranes are dry.   Eyes:      Extraocular Movements: Extraocular movements intact.      Pupils: Pupils are equal, round, and reactive to light.   Cardiovascular:      Rate and Rhythm: Normal rate and regular rhythm.      Pulses: Normal pulses.      Heart sounds: Normal heart sounds. No murmur heard.  No friction rub. No gallop.    Pulmonary:      Effort: Tachypnea and accessory muscle usage present. No respiratory distress.      Breath sounds: Examination of the right-middle  field reveals rhonchi. Examination of the left-middle field reveals rhonchi. Examination of the right-lower field reveals decreased breath sounds. Examination of the left-lower field reveals decreased breath sounds. Decreased breath sounds and rhonchi present. No wheezing.      Comments: Mild expiratory wheeze appreciated  Abdominal:      General: Bowel sounds are normal. There is no distension.      Palpations: Abdomen is soft.      Tenderness: There is no abdominal tenderness. There is no guarding.   Musculoskeletal:         General: No swelling.      Cervical back: Normal range of motion and neck supple.      Right lower le+ Edema present.      Left lower le+ Edema present.   Skin:     General: Skin is warm and dry.      Capillary Refill: Capillary refill takes less than 2 seconds.      Findings: Erythema and wound present. No bruising or rash.      Comments: Ulcers noted L and R lower extremity   Erythema noted to bilateral hands (report chronic)   Neurological:      General: No focal deficit present.      Mental Status: He is alert and oriented to person, place, and time.      GCS: GCS eye subscore is 4. GCS verbal subscore is 4. GCS motor subscore is 6.   Psychiatric:         Mood and Affect: Mood normal.         Behavior: Behavior normal. Behavior is cooperative.           CRANIAL NERVES     CN III, IV, VI   Pupils are equal, round, and reactive to light.       Significant Labs:   All pertinent labs within the past 24 hours have been reviewed.  Bilirubin:   Recent Labs   Lab 22   BILITOT 2.0*     BMP:   Recent Labs   Lab 22   GLU 82   *   K 4.8   CL 91*   CO2 32*   BUN 11   CREATININE 0.8   CALCIUM 9.4     CBC:   Recent Labs   Lab 22   WBC 7.61   HGB 14.6   HCT 44.7        CMP:   Recent Labs   Lab 22   *   K 4.8   CL 91*   CO2 32*   GLU 82   BUN 11   CREATININE 0.8   CALCIUM 9.4   PROT 7.2   ALBUMIN 4.0   BILITOT 2.0*   ALKPHOS 181*    AST 34   ALT 29   ANIONGAP 9   EGFRNONAA >60     Cardiac Markers:   Recent Labs   Lab 02/14/22 2007   *     Troponin:   Recent Labs   Lab 02/14/22  2007   TROPONINI 0.016       Significant Imaging: I have reviewed all pertinent imaging results/findings within the past 24 hours.

## 2022-02-15 NOTE — ASSESSMENT & PLAN NOTE
-CXR with evidence of Left greater than right pleural effusions with bibasilar atelectasis and vascular congestion  -Received 40mg lasix x1 in ED  -Continue Lasix 40mg IV BID  -Supplemental oxygen for SpO2 <90%

## 2022-02-15 NOTE — ASSESSMENT & PLAN NOTE
-presents with SOB, CHUN, and BLE edema  -BLE with erythema and discoloration  -Venous US pending  -Continue to diurese patient with Lasix 40mg IV BID  -Wound care consult

## 2022-02-15 NOTE — PLAN OF CARE
Plan of care discussed with pt. Pt AAOx4, ambulates w/ SBA. VS stable overnight on 2LNC. On room air 88-90%, given duo neb tx overnight, gets SOB w/ activity. Pt diuresing well w/ lasix IVP BID. Redness/swelling to extremities- BLE elevated on pillow.  SBA w/ urinal at bedside.- strict I/O.  Pt denies any c/o of pain or discomfort or SOB at this time. Pt free of injuries this shift.  All questions addressed. Pt voices no concerns during shift change. Safety and comfort measures maintained.

## 2022-02-15 NOTE — ASSESSMENT & PLAN NOTE
Clinically-stable with mild expiratory wheezing. Afebrile. No Elevation of WBC, Non-Productive Cough.  --Will continue home regimen of symbicort  --PRN Oxygen per Nasal Cannula for SpO2 <88%  --Pulse-Ox Monitoring every 4 hours  --Monitor respiratory status closely  --duo nebs PRN for sob/wheezing

## 2022-02-15 NOTE — PHARMACY MED REC
"Admission Medication History     The home medication history was taken by Windy Beltrán CPhT.    Medication history obtained fromBg    You may go to "Admission" then "Reconcile Home Medications" tabs to review and/or act upon these items.      The home medication list has been updated by the Pharmacy department.    Please read ALL comments highlighted in yellow.    Please address this information as you see fit.     Feel free to contact us if you have any questions or require assistance.      The medications listed below were removed from the home medication list.  Please reorder if appropriate:  Patient reports no longer taking the following medication(s):   lotrel 5-40mg   famvir 500mg   neurontin 100mg   avapro 300mg   Promethazine-dm 6.25-15mg/5ml syrp   Diovan/hct 320-25mg        Windy Beltrán CPhT.  Ext [phone]                  .          "

## 2022-02-15 NOTE — SUBJECTIVE & OBJECTIVE
Past Medical History:   Diagnosis Date    Bilateral hearing loss     Hypertension        No past surgical history on file.    Review of patient's allergies indicates:  No Known Allergies    No current facility-administered medications on file prior to encounter.     Current Outpatient Medications on File Prior to Encounter   Medication Sig    bisoprolol (ZEBETA) 5 MG tablet 1 tablet    budesonide-formoterol 160-4.5 mcg (SYMBICORT) 160-4.5 mcg/actuation HFAA 2 puffs    bumetanide (BUMEX) 1 MG tablet 1 tablet    gabapentin (NEURONTIN) 100 MG capsule 1-2 capsules    metOLazone (ZAROXOLYN) 2.5 MG tablet 1 tablet    valsartan-hydrochlorothiazide (DIOVAN-HCT) 320-25 mg per tablet 1 tablet    albuterol (VENTOLIN HFA) 90 mcg/actuation inhaler Inhale 2 puffs into the lungs every 6 (six) hours as needed for Wheezing. Rescue    amLODIPine-benazepriL (LOTREL) 5-40 mg per capsule Take 1 capsule by mouth once daily.    famciclovir (FAMVIR) 500 MG tablet Take 500 mg by mouth 3 (three) times daily.    irbesartan (AVAPRO) 300 MG tablet     mupirocin (BACTROBAN) 2 % ointment Apply to affected area 3 times daily    potassium chloride SA (K-DUR,KLOR-CON) 20 MEQ tablet Take 20 mEq by mouth once daily.    promethazine-dextromethorphan (PROMETHAZINE-DM) 6.25-15 mg/5 mL Syrp Take 5 mLs by mouth nightly as needed.    torsemide (DEMADEX) 20 MG Tab     valsartan (DIOVAN) 320 MG tablet Take 320 mg by mouth once daily.     Family History     Problem Relation (Age of Onset)    No Known Problems Mother, Father        Tobacco Use    Smoking status: Never Smoker    Smokeless tobacco: Never Used   Substance and Sexual Activity    Alcohol use: Not on file    Drug use: Not on file    Sexual activity: Not on file     Review of Systems   Constitutional: Positive for fatigue. Negative for chills, diaphoresis and fever.   HENT: Positive for hearing loss. Negative for sore throat.    Eyes: Negative for visual disturbance.    Respiratory: Positive for shortness of breath. Negative for cough and wheezing.    Cardiovascular: Positive for leg swelling. Negative for chest pain.        CHUN   Gastrointestinal: Negative for abdominal pain, diarrhea, nausea and vomiting.   Genitourinary: Negative for difficulty urinating and flank pain.   Musculoskeletal: Negative for back pain.   Skin: Positive for color change and wound. Negative for rash.   Neurological: Negative for dizziness, weakness and headaches.   Psychiatric/Behavioral: Negative for agitation and confusion.     Objective:     Vital Signs (Most Recent):  Temp: 97.8 °F (36.6 °C) (02/14/22 2157)  Pulse: 67 (02/14/22 2157)  Resp: (!) 24 (02/14/22 2157)  BP: (!) 176/90 (02/14/22 2157)  SpO2: (!) 92 % (02/14/22 2157) Vital Signs (24h Range):  Temp:  [97.6 °F (36.4 °C)-97.8 °F (36.6 °C)] 97.8 °F (36.6 °C)  Pulse:  [64-68] 67  Resp:  [22-24] 24  SpO2:  [92 %-97 %] 92 %  BP: (170-191)/(84-90) 176/90     Weight: 108.9 kg (240 lb)  Body mass index is 33.47 kg/m².    Physical Exam  Vitals and nursing note reviewed.   Constitutional:       General: He is not in acute distress.     Appearance: Normal appearance. He is ill-appearing.      Interventions: Nasal cannula in place.      Comments: 2L   HENT:      Head: Normocephalic and atraumatic.      Right Ear: No decreased hearing noted.      Left Ear: No decreased hearing noted.      Ears:      Comments: Bilateral hearing aids present      Mouth/Throat:      Mouth: Mucous membranes are dry.   Eyes:      Extraocular Movements: Extraocular movements intact.      Pupils: Pupils are equal, round, and reactive to light.   Cardiovascular:      Rate and Rhythm: Normal rate and regular rhythm.      Pulses: Normal pulses.      Heart sounds: Normal heart sounds. No murmur heard.  No friction rub. No gallop.    Pulmonary:      Effort: Tachypnea and accessory muscle usage present. No respiratory distress.      Breath sounds: Examination of the right-middle  field reveals rhonchi. Examination of the left-middle field reveals rhonchi. Examination of the right-lower field reveals decreased breath sounds. Examination of the left-lower field reveals decreased breath sounds. Decreased breath sounds and rhonchi present. No wheezing.      Comments: Mild expiratory wheeze appreciated  Abdominal:      General: Bowel sounds are normal. There is no distension.      Palpations: Abdomen is soft.      Tenderness: There is no abdominal tenderness. There is no guarding.   Musculoskeletal:         General: No swelling.      Cervical back: Normal range of motion and neck supple.      Right lower le+ Edema present.      Left lower le+ Edema present.   Skin:     General: Skin is warm and dry.      Capillary Refill: Capillary refill takes less than 2 seconds.      Findings: Erythema and wound present. No bruising or rash.      Comments: Ulcers noted L and R lower extremity   Erythema noted to bilateral hands (report chronic)   Neurological:      General: No focal deficit present.      Mental Status: He is alert and oriented to person, place, and time.      GCS: GCS eye subscore is 4. GCS verbal subscore is 4. GCS motor subscore is 6.   Psychiatric:         Mood and Affect: Mood normal.         Behavior: Behavior normal. Behavior is cooperative.           CRANIAL NERVES     CN III, IV, VI   Pupils are equal, round, and reactive to light.       Significant Labs: All pertinent labs within the past 24 hours have been reviewed.    Significant Imaging: I have reviewed all pertinent imaging results/findings within the past 24 hours.

## 2022-02-15 NOTE — PLAN OF CARE
Pt arrived to unit. Introduced self as VN for this shift. Admission questions completed by VN. Educated pt on VTE risk, safety precautions, and VN's role in pt care. Opportunity given for pt's questions. All questions answered.       Problem: Adult Inpatient Plan of Care  Goal: Plan of Care Review  Outcome: Ongoing, Progressing  Goal: Patient-Specific Goal (Individualized)  Outcome: Ongoing, Progressing  Goal: Absence of Hospital-Acquired Illness or Injury  Outcome: Ongoing, Progressing  Goal: Optimal Comfort and Wellbeing  Outcome: Ongoing, Progressing  Goal: Readiness for Transition of Care  Outcome: Ongoing, Progressing     Problem: Skin Injury Risk Increased  Goal: Skin Health and Integrity  Outcome: Ongoing, Progressing     Problem: Fall Injury Risk  Goal: Absence of Fall and Fall-Related Injury  Outcome: Ongoing, Progressing     Problem: Fluid Volume Excess  Goal: Fluid Balance  Outcome: Ongoing, Progressing     Problem: Gas Exchange Impaired  Goal: Optimal Gas Exchange  Outcome: Ongoing, Progressing

## 2022-02-15 NOTE — ASSESSMENT & PLAN NOTE
-2/2 to fluid overload due to CHF exacerbation; not on home oxygen   -SpO2 88% on room air with increase work of breathing, tachypnea, abdominal muscle use  -Continue with supplemental oxygen 1-5L for SpO2 <90%  -Duo-nebs for SOB/wheezing  -see congestive heart failure

## 2022-02-15 NOTE — ED NOTES
81 y.o. male to ED referred by primary care doctor for abnormal blood work. Patient c.o. increased dyspnea on exertion and bilateral lower leg swelling. Patient denies n/v/d, denies fever/chills, denies all other medical complaints at this time. Patient awake, alert, and oriented x 4. No apparent distress noted. Patient hypertensive at 176/90. VS otherwise stable. Patient assisted onto stretcher and changed into a gown. Patient placed on cardiac monitor, continuous pulse oximetry and automatic blood pressure cuff. Bed placed in low locked position, side rails up x 2, call light is within reach of patient orientation to room and explanation of wait provided to patient, alarms set and turned on for monitor and pulse ox, awaiting MD evaluation and orders, will continue to monitor.

## 2022-02-16 PROBLEM — E87.6 HYPOKALEMIA: Status: ACTIVE | Noted: 2022-02-16

## 2022-02-16 PROBLEM — R06.02 SOB (SHORTNESS OF BREATH): Status: RESOLVED | Noted: 2022-02-09 | Resolved: 2022-02-16

## 2022-02-16 PROBLEM — R53.1 WEAKNESS: Status: ACTIVE | Noted: 2022-02-16

## 2022-02-16 LAB
ANION GAP SERPL CALC-SCNC: 10 MMOL/L (ref 8–16)
BUN SERPL-MCNC: 15 MG/DL (ref 8–23)
CALCIUM SERPL-MCNC: 9.1 MG/DL (ref 8.7–10.5)
CHLORIDE SERPL-SCNC: 86 MMOL/L (ref 95–110)
CO2 SERPL-SCNC: 36 MMOL/L (ref 23–29)
CREAT SERPL-MCNC: 0.8 MG/DL (ref 0.5–1.4)
EST. GFR  (AFRICAN AMERICAN): >60 ML/MIN/1.73 M^2
EST. GFR  (NON AFRICAN AMERICAN): >60 ML/MIN/1.73 M^2
GLUCOSE SERPL-MCNC: 91 MG/DL (ref 70–110)
POCT GLUCOSE: 130 MG/DL (ref 70–110)
POCT GLUCOSE: 136 MG/DL (ref 70–110)
POCT GLUCOSE: 139 MG/DL (ref 70–110)
POCT GLUCOSE: 97 MG/DL (ref 70–110)
POTASSIUM SERPL-SCNC: 3.6 MMOL/L (ref 3.5–5.1)
SODIUM SERPL-SCNC: 132 MMOL/L (ref 136–145)

## 2022-02-16 PROCEDURE — 36415 COLL VENOUS BLD VENIPUNCTURE: CPT | Performed by: HOSPITALIST

## 2022-02-16 PROCEDURE — 97162 PT EVAL MOD COMPLEX 30 MIN: CPT

## 2022-02-16 PROCEDURE — 63600175 PHARM REV CODE 636 W HCPCS: Performed by: HOSPITALIST

## 2022-02-16 PROCEDURE — 99900035 HC TECH TIME PER 15 MIN (STAT)

## 2022-02-16 PROCEDURE — 99233 SBSQ HOSP IP/OBS HIGH 50: CPT | Mod: ,,, | Performed by: NURSE PRACTITIONER

## 2022-02-16 PROCEDURE — 99233 PR SUBSEQUENT HOSPITAL CARE,LEVL III: ICD-10-PCS | Mod: ,,, | Performed by: NURSE PRACTITIONER

## 2022-02-16 PROCEDURE — 97116 GAIT TRAINING THERAPY: CPT

## 2022-02-16 PROCEDURE — 80048 BASIC METABOLIC PNL TOTAL CA: CPT | Performed by: HOSPITALIST

## 2022-02-16 PROCEDURE — 11000001 HC ACUTE MED/SURG PRIVATE ROOM

## 2022-02-16 PROCEDURE — 25000003 PHARM REV CODE 250: Performed by: NURSE PRACTITIONER

## 2022-02-16 PROCEDURE — 63600175 PHARM REV CODE 636 W HCPCS: Performed by: NURSE PRACTITIONER

## 2022-02-16 PROCEDURE — 25000003 PHARM REV CODE 250

## 2022-02-16 PROCEDURE — 63600175 PHARM REV CODE 636 W HCPCS

## 2022-02-16 PROCEDURE — 25000003 PHARM REV CODE 250: Performed by: HOSPITALIST

## 2022-02-16 PROCEDURE — 94640 AIRWAY INHALATION TREATMENT: CPT

## 2022-02-16 PROCEDURE — 94761 N-INVAS EAR/PLS OXIMETRY MLT: CPT

## 2022-02-16 RX ORDER — FUROSEMIDE 10 MG/ML
60 INJECTION INTRAMUSCULAR; INTRAVENOUS 2 TIMES DAILY
Status: DISCONTINUED | OUTPATIENT
Start: 2022-02-16 | End: 2022-02-16

## 2022-02-16 RX ORDER — AMMONIUM LACTATE 12 G/100G
LOTION TOPICAL 2 TIMES DAILY
Status: DISCONTINUED | OUTPATIENT
Start: 2022-02-16 | End: 2022-02-17 | Stop reason: HOSPADM

## 2022-02-16 RX ORDER — FUROSEMIDE 10 MG/ML
60 INJECTION INTRAMUSCULAR; INTRAVENOUS ONCE
Status: COMPLETED | OUTPATIENT
Start: 2022-02-16 | End: 2022-02-16

## 2022-02-16 RX ADMIN — METOPROLOL TARTRATE 50 MG: 50 TABLET, FILM COATED ORAL at 08:02

## 2022-02-16 RX ADMIN — POTASSIUM BICARBONATE 40 MEQ: 391 TABLET, EFFERVESCENT ORAL at 08:02

## 2022-02-16 RX ADMIN — CEFTRIAXONE 1 G: 1 INJECTION, SOLUTION INTRAVENOUS at 12:02

## 2022-02-16 RX ADMIN — ENOXAPARIN SODIUM 40 MG: 100 INJECTION SUBCUTANEOUS at 04:02

## 2022-02-16 RX ADMIN — AMMONIUM LACTATE: 12 LOTION TOPICAL at 09:02

## 2022-02-16 RX ADMIN — FUROSEMIDE 60 MG: 10 INJECTION, SOLUTION INTRAMUSCULAR; INTRAVENOUS at 12:02

## 2022-02-16 RX ADMIN — FUROSEMIDE 10 MG/HR: 10 INJECTION, SOLUTION INTRAMUSCULAR; INTRAVENOUS at 07:02

## 2022-02-16 RX ADMIN — FLUTICASONE FUROATE AND VILANTEROL TRIFENATATE 1 PUFF: 100; 25 POWDER RESPIRATORY (INHALATION) at 07:02

## 2022-02-16 RX ADMIN — VALSARTAN 320 MG: 160 TABLET, FILM COATED ORAL at 08:02

## 2022-02-16 NOTE — PLAN OF CARE
"Pt oriented. DCA done at bedside with patient. Demographics/Ins/NextofKin/PCP verified with patient/family and updated as needed. Denies any DME needs at present from pt/family. Patient/family plans to return home with family. Educated on bedside RX. Patient consents for [place] to discuss discharge plan with next of kin. Preferences appointment times and location obtained. Patient reports they will have transportation home upon discharge.    Future Appointments   Date Time Provider Department Center   3/23/2022 11:00 AM [facility]       BP (!) 116/55 (BP Location: Right arm, Patient Position: Sitting)   Pulse 63   Temp 96.4 °F (35.8 °C) (Oral)   Resp 18   Ht 5' 11" (1.803 m)   Wt 108.9 kg (240 lb)   SpO2 95%   BMI 33.47 kg/m²      cefTRIAXone (ROCEPHIN) IVPB  1 g Intravenous Q24H    enoxaparin  40 mg Subcutaneous Daily    fluticasone furoate-vilanteroL  1 puff Inhalation Daily    metoprolol tartrate  50 mg Oral BID    valsartan  320 mg Oral Daily        02/16/22 6559   Discharge Planning   Assessment Type Discharge Planning Brief Assessment   Resource/Environmental Concerns none   Support Systems Spouse/significant other   Equipment Currently Used at Home walker, rolling;wheelchair;bedside commode;cane, quad  (WC is for wife and BSC after her prev surgery)   Current Living Arrangements home/apartment/condo   Patient/Family Anticipates Transition to home;home with family   Patient/Family Anticipated Services at Transition home health care   DME Needed Upon Discharge  other (see comments)  (TBD)   Discharge Plan A Home;Home with family;Home Health     "

## 2022-02-16 NOTE — PLAN OF CARE
Problem: Physical Therapy Goal  Goal: Physical Therapy Goal  Description: Goals to be met by:3/18/2022    Patient will increase functional independence with mobility by performin. Supine to sit with Modified [place]  2. Sit to supine with Modified [place]  3. Rolling to Left and Right with Modified [place].  4. Sit to stand transfer with Stand-by Assistance  5. Bed to chair transfer with Stand-by Assistance using Rolling Walker  6. Gait  x 150 feet with Stand-by Assistance using Rolling Walker.     Outcome: Ongoing, Progressing       Patient seen for physical therapy evaluation.  Patient tolerated evaluation fair.  Anticipate if patient continues to improve, he will be able to return home with Home Health PT/OT.  Patient would benefit from a shower chair.

## 2022-02-16 NOTE — SUBJECTIVE & OBJECTIVE
Past Medical History:   Diagnosis Date    Bilateral hearing loss     Hypertension        No past surgical history on file.    Review of patient's allergies indicates:  No Known Allergies    No current facility-administered medications on file prior to encounter.     Current Outpatient Medications on File Prior to Encounter   Medication Sig    bisoprolol (ZEBETA) 5 MG tablet Take 5 mg by mouth once daily.    budesonide-formoterol 160-4.5 mcg (SYMBICORT) 160-4.5 mcg/actuation HFAA Inhale 2 puffs into the lungs every 12 (twelve) hours.    bumetanide (BUMEX) 1 MG tablet Take 1 mg by mouth once daily.    metOLazone (ZAROXOLYN) 2.5 MG tablet Take 2.5 mg by mouth once daily.    potassium chloride SA (K-DUR,KLOR-CON) 20 MEQ tablet Take 20 mEq by mouth once daily.    valsartan (DIOVAN) 320 MG tablet Take 320 mg by mouth once daily.    albuterol (VENTOLIN HFA) 90 mcg/actuation inhaler Inhale 2 puffs into the lungs every 6 (six) hours as needed for Wheezing. Rescue    mupirocin (BACTROBAN) 2 % ointment Apply to affected area 3 times daily    torsemide (DEMADEX) 20 MG Tab      Family History     Problem Relation (Age of Onset)    No Known Problems Mother, Father        Tobacco Use    Smoking status: Never Smoker    Smokeless tobacco: Never Used   Substance and Sexual Activity    Alcohol use: Not on file    Drug use: Not on file    Sexual activity: Not on file     Review of Systems   Constitutional: Negative for diaphoresis.   HENT: Negative.    Eyes: Negative.    Cardiovascular: Negative for chest pain, irregular heartbeat, leg swelling, near-syncope, orthopnea, palpitations, paroxysmal nocturnal dyspnea and syncope.   Respiratory: Positive for sleep disturbances due to breathing. Negative for cough and shortness of breath.    Endocrine: Negative.    Hematologic/Lymphatic: Negative.    Skin: Positive for color change.   Musculoskeletal: Negative.    Gastrointestinal: Negative for bloating, nausea and vomiting.    Genitourinary: Negative.    Neurological: Negative.    Psychiatric/Behavioral: Negative.    Allergic/Immunologic: Negative.      Objective:     Vital Signs (Most Recent):  Temp: 96.4 °F (35.8 °C) (02/16/22 0811)  Pulse: 74 (02/16/22 0833)  Resp: 18 (02/16/22 0811)  BP: (!) 121/59 (02/16/22 0833)  SpO2: (!) 92 % (02/16/22 0811) Vital Signs (24h Range):  Temp:  [96.4 °F (35.8 °C)-98.1 °F (36.7 °C)] 96.4 °F (35.8 °C)  Pulse:  [53-88] 74  Resp:  [17-22] 18  SpO2:  [91 %-95 %] 92 %  BP: (118-167)/(56-79) 121/59     Weight: 108.9 kg (240 lb)  Body mass index is 33.47 kg/m².    SpO2: (!) 92 %  O2 Device (Oxygen Therapy): room air      Intake/Output Summary (Last 24 hours) at 2/16/2022 1024  Last data filed at 2/16/2022 0833  Gross per 24 hour   Intake 1117.43 ml   Output 39605 ml   Net -9582.57 ml       Lines/Drains/Airways     Peripheral Intravenous Line                 Peripheral IV - Single Lumen 20 G Left Antecubital -- days                Physical Exam  Constitutional:       General: He is not in acute distress.     Appearance: He is obese. He is not diaphoretic.   HENT:      Head: Atraumatic.   Eyes:      General:         Right eye: No discharge.         Left eye: No discharge.   Cardiovascular:      Rate and Rhythm: Normal rate and regular rhythm.      Heart sounds: Murmur heard.       Pulmonary:      Breath sounds: No rales.   Abdominal:      General: Bowel sounds are normal. There is no distension.      Palpations: Abdomen is soft.   Musculoskeletal:      Right lower leg: No edema.      Left lower leg: No edema.   Skin:     General: Skin is warm and dry.      Capillary Refill: Capillary refill takes 2 to 3 seconds.   Neurological:      Mental Status: He is alert and oriented to person, place, and time.   Psychiatric:         Mood and Affect: Mood normal.         Behavior: Behavior normal.         Thought Content: Thought content normal.         Judgment: Judgment normal.         Significant Labs:   BMP:   Recent  Labs   Lab 02/14/22  2007 02/15/22  0416 02/16/22  0353   GLU 82 77 91   * 134* 132*   K 4.8 4.1 3.6   CL 91* 91* 86*   CO2 32* 32* 36*   BUN 11 12 15   CREATININE 0.8 0.7 0.8   CALCIUM 9.4 9.3 9.1   MG  --  2.0  --    , CMP   Recent Labs   Lab 02/14/22  2007 02/15/22  0416 02/16/22  0353   * 134* 132*   K 4.8 4.1 3.6   CL 91* 91* 86*   CO2 32* 32* 36*   GLU 82 77 91   BUN 11 12 15   CREATININE 0.8 0.7 0.8   CALCIUM 9.4 9.3 9.1   PROT 7.2  --   --    ALBUMIN 4.0  --   --    BILITOT 2.0*  --   --    ALKPHOS 181*  --   --    AST 34  --   --    ALT 29  --   --    ANIONGAP 9 11 10   ESTGFRAFRICA >60 >60 >60   EGFRNONAA >60 >60 >60   , CBC   Recent Labs   Lab 02/14/22 2007   WBC 7.61   HGB 14.6   HCT 44.7      , INR No results for input(s): INR, PROTIME in the last 48 hours., Lipid Panel No results for input(s): CHOL, HDL, LDLCALC, TRIG, CHOLHDL in the last 48 hours., Troponin   Recent Labs   Lab 02/14/22  2007 02/15/22  0416   TROPONINI 0.016 0.016    and All pertinent lab results from the last 24 hours have been reviewed.    Significant Imaging: Echocardiogram:   Transthoracic echo (TTE) complete (Cupid Only):   Results for orders placed or performed during the hospital encounter of 02/14/22   Echo   Result Value Ref Range    Ascending aorta 4.14 cm    STJ 3.17 cm    AV mean gradient 10 mmHg    Ao peak celio 2.19 m/s    Ao VTI 56.68 cm    IVRT 105.61 msec    IVS 0.98 0.6 - 1.1 cm    LA size 4.78 cm    Left Atrium Major Axis 7.00 cm    Left Atrium Minor Axis 6.24 cm    LVIDd 5.63 3.5 - 6.0 cm    LVIDs 3.22 2.1 - 4.0 cm    LVOT diameter 2.11 cm    LVOT peak VTI 26.86 cm    Posterior Wall 0.90 0.6 - 1.1 cm    MV Peak A Celio 0.83 m/s    E wave deceleration time 237.62 msec    MV Peak E Celio 1.15 m/s    PV Peak D Celio 0.73 m/s    PV Peak S Celio 0.49 m/s    RA Major Axis 6.08 cm    RA Width 4.15 cm    RVDD 4.25 cm    Sinus 3.75 cm    TAPSE 2.30 cm    TR Max Celio 3.56 m/s    TDI LATERAL 0.08 m/s    TDI SEPTAL  0.07 m/s    LA WIDTH 4.11 cm    MV stenosis pressure 1/2 time 68.91 ms    LV Diastolic Volume 155.85 mL    LV Systolic Volume 41.69 mL    LVOT peak priscilla 1.03 m/s    LA volume (mod) 110.00 cm3    LV LATERAL E/E' RATIO 14.38 m/s    LV SEPTAL E/E' RATIO 16.43 m/s    FS 43 %    LA volume 110.18 cm3    LV mass 204.53 g    Left Ventricle Relative Wall Thickness 0.32 cm    AV valve area 1.66 cm2    AV Velocity Ratio 0.47     AV index (prosthetic) 0.47     MV valve area p 1/2 method 3.19 cm2    E/A ratio 1.39     Mean e' 0.08 m/s    Pulm vein S/D ratio 0.67     LVOT area 3.5 cm2    LVOT stroke volume 93.87 cm3    AV peak gradient 19 mmHg    E/E' ratio 15.33 m/s    Triscuspid Valve Regurgitation Peak Gradient 51 mmHg    BSA 2.39 m2    LV Systolic Volume Index 18.0 mL/m2    LV Diastolic Volume Index 67.18 mL/m2    LA Volume Index 47.5 mL/m2    LV Mass Index 88 g/m2    LA Volume Index (Mod) 47.4 mL/m2    Right Atrial Pressure (from IVC) 15 mmHg    EF 65 %    TV rest pulmonary artery pressure 66 mmHg    Narrative    · The left ventricle is normal in size with normal systolic function.  · The estimated ejection fraction is 65%.  · Grade II left ventricular diastolic dysfunction.  · The ascending aorta is mildly dilated.  · Mild right ventricular enlargement with normal right ventricular   systolic function.  · Mild tricuspid regurgitation.  · The estimated PA systolic pressure is 66 mmHg.  · The IVC is enlarged, measuring >3cm. Elevated central venous pressure   (15 mmHg).  · There is pulmonary hypertension.  · Biatrial enlargement.  · Moderate circumferential pericardial effusion with largest diameter   posterolaterally at 1.5cm.  · There is a left pleural effusion.

## 2022-02-16 NOTE — PLAN OF CARE
Plan of care reviewed, pt verbalizes understanding. Pt is AAOX4, NSR on tele monitor, on room air, no acute distress noted.  Due medications given.  IV lasix infusing.  PT safety maintained, bed alarm set, call light within patients reach.  Will continue to monitor.

## 2022-02-16 NOTE — PLAN OF CARE
Plan of care discussed with patient verbalized understanding. Continuous lasix drip at 5ml/hr tolerating well, condom catheter in place adequate amount of urine output noted. No sob or signs of distress noted.

## 2022-02-16 NOTE — CONSULTS
[facility] - Telemetry  Wound Care    Patient Name:  Alexander Kelly   MRN:  [id number]  Date: 2/16/2022  Diagnosis: Acute respiratory failure with hypoxia and hypercarbia    History:     Past Medical History:   Diagnosis Date    Bilateral hearing loss     Hypertension        Social History     Socioeconomic History    Marital status:    Tobacco Use    Smoking status: Never Smoker    Smokeless tobacco: Never Used       Precautions:     Allergies as of 02/14/2022    (No Known Allergies)       WOC Assessment Details/Treatment     BLE - venous dermatitis- dry scaly skin- mild redness- scab to L knee and shin that pt relates to a fall- L lateral lower leg with partial thickness ulceration       Recommendations discussed with pt and Dr. Rojo:  - LacHydrin lotion to BLE BID  - Mepilex border to L lateral leg ulcer 2x/wk    02/16/2022

## 2022-02-16 NOTE — PROGRESS NOTES
[facility] Medicine  Progress Note    Patient Name: Alexander Kelly  MRN: [id number]  Patient Class: IP- Inpatient   Admission Date: 2/14/2022  Length of Stay: 2 days  Attending Physician: Miguel Rojo, *  Primary Care Provider: Niles Martin MD        Subjective:     Principal Problem:Acute respiratory failure with hypoxia and hypercarbia        HPI:  Alexander Kelly is an 82 y/o male with PMHx HTN, H\FpEF, COPD, bilateral hearing loss, and obesity presents to [facility] ED from his doctor office for SOB and CHUN. Patient reports he has increased dyspnea on exertion, shortness of breath, and bilateral lower leg swelling. Patient reports he had chronic SOB, but the CHUN has been worsening since Thursday. Patient does not use home oxygen. Patient reports he had a CXR today and was told he has CHF and  to come be evaluated in the ED. Echo completed today PTA revealed diastolic dysfunction, PHTN, with moderate pericardial effusion and EF 65%. Patient denies chest pain, palpitations, nausea, or vomiting. Patient denies fevers or chills. Patient uses a walker to ambulate.In ED: Na 132, Chloride 91, CO2 32, . Troponin 0.016. CXR with Findings suggesting vascular congestion/edema. Left greater than right pleural effusions with bibasilar atelectasis. Cardiology consulted. Patient will be placed into hospital medicine observation services under my care in collaboration with Dr. Darrian Lawrence.         Overview/Hospital Course:  No notes on file    Interval History:  Substantial amount of diuresis overnight.  Lower extremity edema has greatly improved.  Patient states that he feels much better overall after removal fluid.  He is sitting up in a chair off of supplemental oxygen at this time.  Still with some abdominal swelling.  Cardiology recommends discontinuing Lasix drip today and starting push Lasix.    Review of Systems   Constitutional: Negative for fever.   Respiratory: Positive for shortness of  breath (improving).    Cardiovascular: Positive for leg swelling (improving).   Skin: Positive for rash.   Neurological: Positive for weakness.     Objective:     Vital Signs (Most Recent):  Temp: 96.4 °F (35.8 °C) (02/16/22 1200)  Pulse: 63 (02/16/22 1200)  Resp: 18 (02/16/22 1200)  BP: (!) 116/55 (02/16/22 1200)  SpO2: 95 % (02/16/22 1200) Vital Signs (24h Range):  Temp:  [96.4 °F (35.8 °C)-98.1 °F (36.7 °C)] 96.4 °F (35.8 °C)  Pulse:  [53-88] 63  Resp:  [17-18] 18  SpO2:  [91 %-95 %] 95 %  BP: (116-167)/(55-79) 116/55     Weight: 108.9 kg (240 lb)  Body mass index is 33.47 kg/m².    Intake/Output Summary (Last 24 hours) at 2/16/2022 1317  Last data filed at 2/16/2022 0833  Gross per 24 hour   Intake 777.43 ml   Output 9700 ml   Net -8922.57 ml      Physical Exam  Vitals and nursing note reviewed.   Constitutional:       General: He is not in acute distress.     Appearance: Normal appearance. He is ill-appearing.      Interventions: Nasal cannula in place.   HENT:      Head: Normocephalic and atraumatic.      Right Ear: No decreased hearing noted.      Left Ear: No decreased hearing noted.      Ears:      Comments: Bilateral hearing aids present      Mouth/Throat:      Mouth: Mucous membranes are dry.   Eyes:      Extraocular Movements: Extraocular movements intact.      Pupils: Pupils are equal, round, and reactive to light.   Cardiovascular:      Rate and Rhythm: Normal rate and regular rhythm.      Pulses: Normal pulses.      Heart sounds: Normal heart sounds. No murmur heard.  No friction rub. No gallop.    Pulmonary:      Effort: No tachypnea, accessory muscle usage or respiratory distress.      Breath sounds: Examination of the right-middle field reveals rhonchi. Examination of the left-middle field reveals rhonchi. Examination of the right-lower field reveals decreased breath sounds. Examination of the left-lower field reveals decreased breath sounds. Decreased breath sounds and rhonchi present. No  wheezing.      Comments: Mild expiratory wheeze appreciated  Abdominal:      General: Bowel sounds are normal. There is no distension.      Palpations: Abdomen is soft.      Tenderness: There is no abdominal tenderness. There is no guarding.   Musculoskeletal:         General: No swelling.      Cervical back: Normal range of motion and neck supple.      Right lower le+ Edema present.      Left lower le+ Edema present.   Skin:     General: Skin is warm and dry.      Capillary Refill: Capillary refill takes less than 2 seconds.      Findings: Erythema and wound present. No bruising or rash.      Comments: Ulcers noted L and R lower extremity   Erythema noted to bilateral hands (report chronic)   Neurological:      General: No focal deficit present.      Mental Status: He is alert and oriented to person, place, and time.      GCS: GCS eye subscore is 4. GCS verbal subscore is 4. GCS motor subscore is 6.   Psychiatric:         Mood and Affect: Mood normal.         Behavior: Behavior normal. Behavior is cooperative.         Significant Labs: All pertinent labs within the past 24 hours have been reviewed.    Significant Imaging: I have reviewed all pertinent imaging results/findings within the past 24 hours.      Assessment/Plan:      * Acute respiratory failure with hypoxia and hypercarbia  -2/2 to fluid overload due to CHF exacerbation; not on home oxygen   -SpO2 88% on room air with increase work of breathing, tachypnea, abdominal muscle use  -Continue with supplemental oxygen 1-5L for SpO2 <90%  -Duo-nebs for SOB/wheezing  -see congestive heart failure    Hypokalemia  -due to diuresis  -replace      Weakness  -PT/OT consult, recommend home health      COPD (chronic obstructive pulmonary disease)  Clinically-stable with mild expiratory wheezing. Afebrile. No Elevation of WBC, Non-Productive Cough.  --Will continue home regimen of symbicort  --PRN Oxygen per Nasal Cannula for SpO2 <88%  --Pulse-Ox Monitoring  every 4 hours  --Monitor respiratory status closely  --duo nebs PRN for sob/wheezing        Bilateral pleural effusion  -CXR with evidence of Left greater than right pleural effusions with bibasilar atelectasis and vascular congestion  -Received 40mg lasix x1 in ED  -Continue diuresis as discussed above  -Supplemental oxygen for SpO2 <90%        Pulmonary hypertension  -Noted on echo  -Continue with diuresis  -Cardiology consulted        Acute on chronic diastolic congestive heart failure  Patient presents with shortness of breath and CHUN. Bibasilar rales and peripheral edema on exam  - consistent clinical presentation;   -CXR with findings suggesting vascular congestion/edema. Left greater than right pleural effusions with bibasilar atelectasis.   - initiated lasix 40mg IV in ED, then placed on Lasix drip  - will now deescalate to Lasix 60 mg IV  - continue home BB and ARB  - Daily weights  - Strict I/Os  - Monitor on telemetry  - Monitor and trend BMP, Mg, and renal function; keep K >4, Mg >2  - Sodium restriction (<2g/d), fluid restriction (1.5L)  - Echo with diastolic dysfunction, PHTN, EF 65% with moderate pleural effusion   - Monitor for signs of fluid overload: RR>30, O2 sat<92%, weight gain of >3 lbs in 24 hours, or urinary output <160ml/8hr  -Cardiology consulted, appreciate recs    Obesity (BMI 30-39.9)  Body mass index is 33.47 kg/m². Morbid obesity complicates all aspects of disease management from diagnostic modalities to treatment.   Weight loss encouraged and health benefits explained to patient.        Venous stasis dermatitis of both lower extremities  -presents with SOB, CHUN, and BLE edema  -BLE with erythema and discoloration  -Venous US negative for clot  -initially diuresed on Lasix drip; now on Lasix 60 mg IV  -Wound care consult  -does have some blanching erythema lower extremities, antibiotics for potential cellulitis          VTE Risk Mitigation (From admission, onward)          Ordered     enoxaparin injection 40 mg  Daily         02/14/22 2331     IP VTE HIGH RISK PATIENT  Once         02/14/22 2331     Place sequential compression device  Until discontinued         02/14/22 2331                Discharge Planning   DENNIS: 2/17/2022     Code Status: Prior   Is the patient medically ready for discharge?:     Reason for patient still in hospital (select all that apply): Patient trending condition and Consult recommendations                     Miguel Rojo MD  Department of Hospital Medicine   [facility]

## 2022-02-16 NOTE — PROGRESS NOTES
[facility] - Telemetry  Cardiology  Progress Note    Patient Name: Alexander Kelly  MRN: [id number]  Admission Date: 2/14/2022  Hospital Length of Stay: 2 days  Code Status: Prior   Attending Physician: Miguel Rojo, *   Primary Care Physician: Niles Martin MD  Expected Discharge Date: 2/17/2022  Principal Problem:Acute respiratory failure with hypoxia and hypercarbia    Subjective:     Hospital Course:   02/15/2022 Per HPI   05/16/2022 100 cc intake 10.7L output, net -11.9L since admission. Lasix gtt discontinued and will given Lasix 60 mg IV x 1 then hold. Volume status improved. Hemodynamically stable.       Past Medical History:   Diagnosis Date    Bilateral hearing loss     Hypertension        No past surgical history on file.    Review of patient's allergies indicates:  No Known Allergies    No current facility-administered medications on file prior to encounter.     Current Outpatient Medications on File Prior to Encounter   Medication Sig    bisoprolol (ZEBETA) 5 MG tablet Take 5 mg by mouth once daily.    budesonide-formoterol 160-4.5 mcg (SYMBICORT) 160-4.5 mcg/actuation HFAA Inhale 2 puffs into the lungs every 12 (twelve) hours.    bumetanide (BUMEX) 1 MG tablet Take 1 mg by mouth once daily.    metOLazone (ZAROXOLYN) 2.5 MG tablet Take 2.5 mg by mouth once daily.    potassium chloride SA (K-DUR,KLOR-CON) 20 MEQ tablet Take 20 mEq by mouth once daily.    valsartan (DIOVAN) 320 MG tablet Take 320 mg by mouth once daily.    albuterol (VENTOLIN HFA) 90 mcg/actuation inhaler Inhale 2 puffs into the lungs every 6 (six) hours as needed for Wheezing. Rescue    mupirocin (BACTROBAN) 2 % ointment Apply to affected area 3 times daily    torsemide (DEMADEX) 20 MG Tab      Family History     Problem Relation (Age of Onset)    No Known Problems Mother, Father        Tobacco Use    Smoking status: Never Smoker    Smokeless tobacco: Never Used   Substance and Sexual Activity    Alcohol use: Not on  file    Drug use: Not on file    Sexual activity: Not on file     Review of Systems   Constitutional: Negative for diaphoresis.   HENT: Negative.    Eyes: Negative.    Cardiovascular: Negative for chest pain, irregular heartbeat, leg swelling, near-syncope, orthopnea, palpitations, paroxysmal nocturnal dyspnea and syncope.   Respiratory: Positive for sleep disturbances due to breathing. Negative for cough and shortness of breath.    Endocrine: Negative.    Hematologic/Lymphatic: Negative.    Skin: Positive for color change.   Musculoskeletal: Negative.    Gastrointestinal: Negative for bloating, nausea and vomiting.   Genitourinary: Negative.    Neurological: Negative.    Psychiatric/Behavioral: Negative.    Allergic/Immunologic: Negative.      Objective:     Vital Signs (Most Recent):  Temp: 96.4 °F (35.8 °C) (02/16/22 0811)  Pulse: 74 (02/16/22 0833)  Resp: 18 (02/16/22 0811)  BP: (!) 121/59 (02/16/22 0833)  SpO2: (!) 92 % (02/16/22 0811) Vital Signs (24h Range):  Temp:  [96.4 °F (35.8 °C)-98.1 °F (36.7 °C)] 96.4 °F (35.8 °C)  Pulse:  [53-88] 74  Resp:  [17-22] 18  SpO2:  [91 %-95 %] 92 %  BP: (118-167)/(56-79) 121/59     Weight: 108.9 kg (240 lb)  Body mass index is 33.47 kg/m².    SpO2: (!) 92 %  O2 Device (Oxygen Therapy): room air      Intake/Output Summary (Last 24 hours) at 2/16/2022 1024  Last data filed at 2/16/2022 0833  Gross per 24 hour   Intake 1117.43 ml   Output 15552 ml   Net -9582.57 ml       Lines/Drains/Airways     Peripheral Intravenous Line                 Peripheral IV - Single Lumen 20 G Left Antecubital -- days                Physical Exam  Constitutional:       General: He is not in acute distress.     Appearance: He is obese. He is not diaphoretic.   HENT:      Head: Atraumatic.   Eyes:      General:         Right eye: No discharge.         Left eye: No discharge.   Cardiovascular:      Rate and Rhythm: Normal rate and regular rhythm.      Heart sounds: Murmur heard.       Pulmonary:       Breath sounds: No rales.   Abdominal:      General: Bowel sounds are normal. There is no distension.      Palpations: Abdomen is soft.   Musculoskeletal:      Right lower leg: No edema.      Left lower leg: No edema.   Skin:     General: Skin is warm and dry.      Capillary Refill: Capillary refill takes 2 to 3 seconds.   Neurological:      Mental Status: He is alert and oriented to person, place, and time.   Psychiatric:         Mood and Affect: Mood normal.         Behavior: Behavior normal.         Thought Content: Thought content normal.         Judgment: Judgment normal.         Significant Labs:   BMP:   Recent Labs   Lab 02/14/22  2007 02/15/22  0416 02/16/22  0353   GLU 82 77 91   * 134* 132*   K 4.8 4.1 3.6   CL 91* 91* 86*   CO2 32* 32* 36*   BUN 11 12 15   CREATININE 0.8 0.7 0.8   CALCIUM 9.4 9.3 9.1   MG  --  2.0  --    , CMP   Recent Labs   Lab 02/14/22  2007 02/15/22  0416 02/16/22  0353   * 134* 132*   K 4.8 4.1 3.6   CL 91* 91* 86*   CO2 32* 32* 36*   GLU 82 77 91   BUN 11 12 15   CREATININE 0.8 0.7 0.8   CALCIUM 9.4 9.3 9.1   PROT 7.2  --   --    ALBUMIN 4.0  --   --    BILITOT 2.0*  --   --    ALKPHOS 181*  --   --    AST 34  --   --    ALT 29  --   --    ANIONGAP 9 11 10   ESTGFRAFRICA >60 >60 >60   EGFRNONAA >60 >60 >60   , CBC   Recent Labs   Lab 02/14/22 2007   WBC 7.61   HGB 14.6   HCT 44.7      , INR No results for input(s): INR, PROTIME in the last 48 hours., Lipid Panel No results for input(s): CHOL, HDL, LDLCALC, TRIG, CHOLHDL in the last 48 hours., Troponin   Recent Labs   Lab 02/14/22  2007 02/15/22  0416   TROPONINI 0.016 0.016    and All pertinent lab results from the last 24 hours have been reviewed.    Significant Imaging: Echocardiogram:   Transthoracic echo (TTE) complete ([facility] Only):   Results for orders placed or performed during the hospital encounter of 02/14/22   Echo   Result Value Ref Range    Ascending aorta 4.14 cm    STJ 3.17 cm    AV mean gradient  10 mmHg    Ao peak celio 2.19 m/s    Ao VTI 56.68 cm    IVRT 105.61 msec    IVS 0.98 0.6 - 1.1 cm    LA size 4.78 cm    Left Atrium Major Axis 7.00 cm    Left Atrium Minor Axis 6.24 cm    LVIDd 5.63 3.5 - 6.0 cm    LVIDs 3.22 2.1 - 4.0 cm    LVOT diameter 2.11 cm    LVOT peak VTI 26.86 cm    Posterior Wall 0.90 0.6 - 1.1 cm    MV Peak A Celio 0.83 m/s    E wave deceleration time 237.62 msec    MV Peak E Celio 1.15 m/s    PV Peak D Celio 0.73 m/s    PV Peak S Celio 0.49 m/s    RA Major Axis 6.08 cm    RA Width 4.15 cm    RVDD 4.25 cm    Sinus 3.75 cm    TAPSE 2.30 cm    TR Max Celio 3.56 m/s    TDI LATERAL 0.08 m/s    TDI SEPTAL 0.07 m/s    LA WIDTH 4.11 cm    MV stenosis pressure 1/2 time 68.91 ms    LV Diastolic Volume 155.85 mL    LV Systolic Volume 41.69 mL    LVOT peak celio 1.03 m/s    LA volume (mod) 110.00 cm3    LV LATERAL E/E' RATIO 14.38 m/s    LV SEPTAL E/E' RATIO 16.43 m/s    FS 43 %    LA volume 110.18 cm3    LV mass 204.53 g    Left Ventricle Relative Wall Thickness 0.32 cm    AV valve area 1.66 cm2    AV Velocity Ratio 0.47     AV index (prosthetic) 0.47     MV valve area p 1/2 method 3.19 cm2    E/A ratio 1.39     Mean e' 0.08 m/s    Pulm vein S/D ratio 0.67     LVOT area 3.5 cm2    LVOT stroke volume 93.87 cm3    AV peak gradient 19 mmHg    E/E' ratio 15.33 m/s    Triscuspid Valve Regurgitation Peak Gradient 51 mmHg    BSA 2.39 m2    LV Systolic Volume Index 18.0 mL/m2    LV Diastolic Volume Index 67.18 mL/m2    LA Volume Index 47.5 mL/m2    LV Mass Index 88 g/m2    LA Volume Index (Mod) 47.4 mL/m2    Right Atrial Pressure (from IVC) 15 mmHg    EF 65 %    TV rest pulmonary artery pressure 66 mmHg    Narrative    · The left ventricle is normal in size with normal systolic function.  · The estimated ejection fraction is 65%.  · Grade II left ventricular diastolic dysfunction.  · The ascending aorta is mildly dilated.  · Mild right ventricular enlargement with normal right ventricular   systolic function.  · Mild  tricuspid regurgitation.  · The estimated PA systolic pressure is 66 mmHg.  · The IVC is enlarged, measuring >3cm. Elevated central venous pressure   (15 mmHg).  · There is pulmonary hypertension.  · Biatrial enlargement.  · Moderate circumferential pericardial effusion with largest diameter   posterolaterally at 1.5cm.  · There is a left pleural effusion.        Assessment and Plan:     Brief HPI: Patient seen this morning on rounds, markedly improved volume status.     Bilateral pleural effusion  Expect improvement with diuresis     Acute diastolic congestive heart failure   02/14/22    Echo    Interpretation Summary  · The left ventricle is normal in size with normal systolic function.  · The estimated ejection fraction is 65%.  · Grade II left ventricular diastolic dysfunction.  · The ascending aorta is mildly dilated.  · Mild right ventricular enlargement with normal right ventricular systolic function.  · Mild tricuspid regurgitation.  · The estimated PA systolic pressure is 66 mmHg.  · The IVC is enlarged, measuring >3cm. Elevated central venous pressure (15 mmHg).  · There is pulmonary hypertension.  · Biatrial enlargement.  · Moderate circumferential pericardial effusion with largest diameter posterolaterally at 1.5cm.  · There is a left pleural effusion.    Recent Labs   Lab 02/14/22 2007   *   .  ADHF in setting of dietary sodium indiscretion   Continue BB and ARB   Net -11.9 L from admission   Discontinued Lasix gtt. Will give Lasix 60 mg IV x 1 today  Volume status markedly improved   Dietary consult   Daily I&O  Daily weights   Will need to dose Lasix for noncompliance on discharge. He does not have insight into his sodium intake. Patient drinks a V8 everyday, eats ham/turkey sandwiches, canned stew due to ease of preparation.      Obesity (BMI 30-39.9)  Weight loss encouraged     Venous stasis dermatitis of both lower extremities  Chronic and stable        VTE Risk Mitigation (From admission,  onward)         Ordered     enoxaparin injection 40 mg  Daily         02/14/22 2331     IP VTE HIGH RISK PATIENT  Once         02/14/22 2331     Place sequential compression device  Until discontinued         02/14/22 2331                Chevy Berman NP  Cardiology  [place] - Telemetry

## 2022-02-16 NOTE — ASSESSMENT & PLAN NOTE
02/14/22    Echo    Interpretation Summary  · The left ventricle is normal in size with normal systolic function.  · The estimated ejection fraction is 65%.  · Grade II left ventricular diastolic dysfunction.  · The ascending aorta is mildly dilated.  · Mild right ventricular enlargement with normal right ventricular systolic function.  · Mild tricuspid regurgitation.  · The estimated PA systolic pressure is 66 mmHg.  · The IVC is enlarged, measuring >3cm. Elevated central venous pressure (15 mmHg).  · There is pulmonary hypertension.  · Biatrial enlargement.  · Moderate circumferential pericardial effusion with largest diameter posterolaterally at 1.5cm.  · There is a left pleural effusion.    Recent Labs   Lab 02/14/22 2007   *   .  ADHF in setting of dietary sodium indiscretion   Continue BB and ARB   Net -11.9 L from admission   Discontinued Lasix gtt. Will give Lasix 60 mg IV x 1 today  Volume status markedly improved   Dietary consult   Daily I&O  Daily weights   Will need to dose Lasix for noncompliance on discharge. He does not have insight into his sodium intake. Patient drinks a V8 everyday, eats ham/turkey sandwiches, canned stew due to ease of preparation.

## 2022-02-16 NOTE — PT/OT/SLP PROGRESS
Occupational Therapy      Patient Name:  Alexander Kelly   MRN:  [id number]    Patient not seen today secondary to Patient unwilling to participate (pt declined at this time as he just returned to bed, is eating lunch and deferred until tomorrow).     2/16/2022

## 2022-02-16 NOTE — CONSULTS
Food & Nutrition  Education    Diet Education: heart failure  Time Spent: 15 minutes  Learners: pt and wife      Nutrition Education provided with handouts:   Heart Failure Nutrition Therapy    Comments:  Pt on phone at visit. Asked me to speak with his wife regarding diet education. Educated his wife on fluid and salt restricted diet. Discussed foods high in sodium to avoid and cooking without salt. Discussed 1500ml fluid restriction and importance of weighing himself daily to monitor for fluid retention. Handouts provided.    All questions and concerns answered. Dietitian's contact information provided.       Follow-Up: 2/21/22    Please Re-consult as needed      Thanks!  Tameka Diehl, NASRA,LDN

## 2022-02-16 NOTE — SUBJECTIVE & OBJECTIVE
Interval History:  Substantial amount of diuresis overnight.  Lower extremity edema has greatly improved.  Patient states that he feels much better overall after removal fluid.  He is sitting up in a chair off of supplemental oxygen at this time.  Still with some abdominal swelling.  Cardiology recommends discontinuing Lasix drip today and starting push Lasix.    Review of Systems   Constitutional: Negative for fever.   Respiratory: Positive for shortness of breath (improving).    Cardiovascular: Positive for leg swelling (improving).   Skin: Positive for rash.   Neurological: Positive for weakness.     Objective:     Vital Signs (Most Recent):  Temp: 96.4 °F (35.8 °C) (02/16/22 1200)  Pulse: 63 (02/16/22 1200)  Resp: 18 (02/16/22 1200)  BP: (!) 116/55 (02/16/22 1200)  SpO2: 95 % (02/16/22 1200) Vital Signs (24h Range):  Temp:  [96.4 °F (35.8 °C)-98.1 °F (36.7 °C)] 96.4 °F (35.8 °C)  Pulse:  [53-88] 63  Resp:  [17-18] 18  SpO2:  [91 %-95 %] 95 %  BP: (116-167)/(55-79) 116/55     Weight: 108.9 kg (240 lb)  Body mass index is 33.47 kg/m².    Intake/Output Summary (Last 24 hours) at 2/16/2022 1317  Last data filed at 2/16/2022 0833  Gross per 24 hour   Intake 777.43 ml   Output 9700 ml   Net -8922.57 ml      Physical Exam  Vitals and nursing note reviewed.   Constitutional:       General: He is not in acute distress.     Appearance: Normal appearance. He is ill-appearing.      Interventions: Nasal cannula in place.   HENT:      Head: Normocephalic and atraumatic.      Right Ear: No decreased hearing noted.      Left Ear: No decreased hearing noted.      Ears:      Comments: Bilateral hearing aids present      Mouth/Throat:      Mouth: Mucous membranes are dry.   Eyes:      Extraocular Movements: Extraocular movements intact.      Pupils: Pupils are equal, round, and reactive to light.   Cardiovascular:      Rate and Rhythm: Normal rate and regular rhythm.      Pulses: Normal pulses.      Heart sounds: Normal heart  sounds. No murmur heard.  No friction rub. No gallop.    Pulmonary:      Effort: No tachypnea, accessory muscle usage or respiratory distress.      Breath sounds: Examination of the right-middle field reveals rhonchi. Examination of the left-middle field reveals rhonchi. Examination of the right-lower field reveals decreased breath sounds. Examination of the left-lower field reveals decreased breath sounds. Decreased breath sounds and rhonchi present. No wheezing.      Comments: Mild expiratory wheeze appreciated  Abdominal:      General: Bowel sounds are normal. There is no distension.      Palpations: Abdomen is soft.      Tenderness: There is no abdominal tenderness. There is no guarding.   Musculoskeletal:         General: No swelling.      Cervical back: Normal range of motion and neck supple.      Right lower le+ Edema present.      Left lower le+ Edema present.   Skin:     General: Skin is warm and dry.      Capillary Refill: Capillary refill takes less than 2 seconds.      Findings: Erythema and wound present. No bruising or rash.      Comments: Ulcers noted L and R lower extremity   Erythema noted to bilateral hands (report chronic)   Neurological:      General: No focal deficit present.      Mental Status: He is alert and oriented to person, place, and time.      GCS: GCS eye subscore is 4. GCS verbal subscore is 4. GCS motor subscore is 6.   Psychiatric:         Mood and Affect: Mood normal.         Behavior: Behavior normal. Behavior is cooperative.         Significant Labs: All pertinent labs within the past 24 hours have been reviewed.    Significant Imaging: I have reviewed all pertinent imaging results/findings within the past 24 hours.

## 2022-02-16 NOTE — ASSESSMENT & PLAN NOTE
-presents with SOB, CHUN, and BLE edema  -BLE with erythema and discoloration  -Venous US negative for clot  -initially diuresed on Lasix drip; now on Lasix 60 mg IV  -Wound care consult  -does have some blanching erythema lower extremities, antibiotics for potential cellulitis

## 2022-02-16 NOTE — PT/OT/SLP EVAL
"Physical Therapy Evaluation    Patient Name:  Alexander Kelly   MRN:  [id number]    Recommendations:     Discharge Recommendations:  home health PT,home health OT   Discharge Equipment Recommendations: shower chair   Barriers to discharge: Decreased caregiver support    Assessment:     Alexander Kelly is a 81 y.o. male admitted with a medical diagnosis of Acute respiratory failure with hypoxia and hypercarbia.  He presents with the following impairments/functional limitations:  weakness,impaired balance,decreased safety awareness,impaired skin,impaired endurance,pain,edema,impaired cardiopulmonary response to activity,impaired self care skills,impaired functional mobilty,gait instability,decreased lower extremity function.  Patient seen for physical therapy evaluation.  Patient tolerated evaluation fair.  Anticipate if patient continues to improve, he will be able to return home with Home Health PT/OT.  Patient would benefit from a shower chair.      Rehab Prognosis: Fair; patient would benefit from acute skilled PT services to address these deficits and reach maximum level of function.    Recent Surgery: * No surgery found *      Plan:     During this hospitalization, patient to be seen 3 x/week to address the identified rehab impairments via therapeutic activities,therapeutic exercises,gait training,neuromuscular re-education and progress toward the following goals:    · Plan of Care Expires:  03/18/22    Subjective     Chief Complaint: "I have been urinating all night .... "  Patient/Family Comments/goals: To return home at [facility]  Pain/Comfort:  · Pain Rating 1: 6/10  · Location - Side 1: Right  · Location - Orientation 1: lower  · Location 1: leg  · Pain Addressed 1: Reposition,Distraction,Cessation of Activity,Nurse notified  · Pain Rating Post-Intervention 1: 6/10    Patients cultural, spiritual, [employer] conflicts given the current situation: no    Living Environment:  Patient lives with spouse in 1 , no JESSICA.  WIS " in bathroom with grab bars.    Prior to admission, patients level of function was patient was recently starting to use the RW occasionally due to SOB.  + Driving.  Independent with ADLs.  Patient had fall ~1 month ago with multiple abrasions and required stitches to head.    Equipment used at home: wheelchair,walker, rolling,cane, quad.  DME owned (not currently used): none.  Upon discharge, patient will have assistance from spouse/family.    Objective:     Communicated with nurse prior to session.  Patient found supine with bed alarm,Condom Catheter,telemetry  upon PT entry to room.    General Precautions: Standard, fall,hearing impaired   Orthopedic Precautions:N/A   Braces: N/A  Respiratory Status: Room air    Exams:  · Cognitive Exam:  Patient is oriented to Person, Place, Time and Situation  · Fine Motor Coordination:    · -       Intact  Left hand thumb/finger opposition skills, Right hand thumb/finger opposition skills and Rapid alternating ankle DF/PF  · Gross Motor Coordination:  WFL  · Postural Exam:  Patient presented with the following abnormalities:    · -       Rounded shoulders  · -       Forward head  · -       very kyphotic with gait  · Sensation:    · -       Intact  light/touch grossly to B LEs  · Skin Integrity/Edema:      · -       Skin integrity: Erythema on B lower LEs and hands.  Patient has multiple healing scars on legs.  · -       Edema: Mild B LEs  · RLE ROM: WFL, except Dorsiflexion to neutral  · RLE Strength: WFL, 4/5  · LLE ROM: WFL, except dorsiflexion to neutral  · LLE Strength: WFL, 4/5    Functional Mobility:  · Bed Mobility:     · Rolling Left:  contact guard assistance  · Scooting: minimum assistance  · Supine to Sit: minimum assistance  · Transfers:     · Sit to Stand:  minimum assistance with rolling walker  · Gait: with RW x 35' with CGA, very flexed posture, slow pace  · Balance: Sitting EOB:  no LOB, SBA.  Standing:  CGA, unsteady    Therapeutic Activities and Exercises:    Educated on role of physical therapy.  Educated to perform APs, Heel Slides while in recliner    AM-PAC 6 CLICK MOBILITY  Total Score:16     Patient left up in chair with call button in reach, chair alarm on and nurse notified.    GOALS:   Multidisciplinary Problems     Physical Therapy Goals        Problem: Physical Therapy Goal    Goal Priority Disciplines Outcome Goal Variances Interventions   Physical Therapy Goal     PT, PT/OT Ongoing, Progressing     Description: Goals to be met by:3/18/2022    Patient will increase functional independence with mobility by performin. Supine to sit with Modified [place]  2. Sit to supine with Modified [place]  3. Rolling to Left and Right with Modified [place].  4. Sit to stand transfer with Stand-by Assistance  5. Bed to chair transfer with Stand-by Assistance using Rolling Walker  6. Gait  x 150 feet with Stand-by Assistance using Rolling Walker.                      History:     Past Medical History:   Diagnosis Date    Bilateral hearing loss     Hypertension        No past surgical history on file.    Time Tracking:     PT Received On: 22  PT Start Time: 1000     PT Stop Time: 1031  PT Total Time (min): 31 min     Billable Minutes: Evaluation 15 and Gait Training 16      2022

## 2022-02-16 NOTE — ASSESSMENT & PLAN NOTE
-CXR with evidence of Left greater than right pleural effusions with bibasilar atelectasis and vascular congestion  -Received 40mg lasix x1 in ED  -Continue diuresis as discussed above  -Supplemental oxygen for SpO2 <90%

## 2022-02-17 ENCOUNTER — PATIENT OUTREACH (OUTPATIENT)
Dept: ADMINISTRATIVE | Facility: OTHER | Age: 82
End: 2022-02-17
Payer: MEDICARE

## 2022-02-17 VITALS
WEIGHT: 240 LBS | HEART RATE: 65 BPM | TEMPERATURE: 98 F | DIASTOLIC BLOOD PRESSURE: 67 MMHG | HEIGHT: 71 IN | OXYGEN SATURATION: 93 % | RESPIRATION RATE: 18 BRPM | BODY MASS INDEX: 33.6 KG/M2 | SYSTOLIC BLOOD PRESSURE: 130 MMHG

## 2022-02-17 LAB
ALBUMIN SERPL BCP-MCNC: 3.3 G/DL (ref 3.5–5.2)
ALP SERPL-CCNC: 153 U/L (ref 55–135)
ALT SERPL W/O P-5'-P-CCNC: 25 U/L (ref 10–44)
ANION GAP SERPL CALC-SCNC: 12 MMOL/L (ref 8–16)
AST SERPL-CCNC: 39 U/L (ref 10–40)
BILIRUB DIRECT SERPL-MCNC: 1.1 MG/DL (ref 0.1–0.3)
BILIRUB SERPL-MCNC: 2.8 MG/DL (ref 0.1–1)
BUN SERPL-MCNC: 20 MG/DL (ref 8–23)
CALCIUM SERPL-MCNC: 9.3 MG/DL (ref 8.7–10.5)
CHLORIDE SERPL-SCNC: 87 MMOL/L (ref 95–110)
CO2 SERPL-SCNC: 33 MMOL/L (ref 23–29)
CREAT SERPL-MCNC: 0.8 MG/DL (ref 0.5–1.4)
EST. GFR  (AFRICAN AMERICAN): >60 ML/MIN/1.73 M^2
EST. GFR  (NON AFRICAN AMERICAN): >60 ML/MIN/1.73 M^2
GLUCOSE SERPL-MCNC: 100 MG/DL (ref 70–110)
POCT GLUCOSE: 115 MG/DL (ref 70–110)
POCT GLUCOSE: 146 MG/DL (ref 70–110)
POTASSIUM SERPL-SCNC: 3.5 MMOL/L (ref 3.5–5.1)
PROT SERPL-MCNC: 6.9 G/DL (ref 6–8.4)
SODIUM SERPL-SCNC: 132 MMOL/L (ref 136–145)

## 2022-02-17 PROCEDURE — 94761 N-INVAS EAR/PLS OXIMETRY MLT: CPT

## 2022-02-17 PROCEDURE — 25000242 PHARM REV CODE 250 ALT 637 W/ HCPCS

## 2022-02-17 PROCEDURE — 99900035 HC TECH TIME PER 15 MIN (STAT)

## 2022-02-17 PROCEDURE — 99233 SBSQ HOSP IP/OBS HIGH 50: CPT | Mod: ,,, | Performed by: NURSE PRACTITIONER

## 2022-02-17 PROCEDURE — 80048 BASIC METABOLIC PNL TOTAL CA: CPT | Performed by: HOSPITALIST

## 2022-02-17 PROCEDURE — 97165 OT EVAL LOW COMPLEX 30 MIN: CPT

## 2022-02-17 PROCEDURE — 80076 HEPATIC FUNCTION PANEL: CPT | Performed by: HOSPITALIST

## 2022-02-17 PROCEDURE — 25000003 PHARM REV CODE 250

## 2022-02-17 PROCEDURE — 36415 COLL VENOUS BLD VENIPUNCTURE: CPT | Performed by: HOSPITALIST

## 2022-02-17 PROCEDURE — 25000003 PHARM REV CODE 250: Performed by: NURSE PRACTITIONER

## 2022-02-17 PROCEDURE — 94640 AIRWAY INHALATION TREATMENT: CPT

## 2022-02-17 PROCEDURE — 25000003 PHARM REV CODE 250: Performed by: HOSPITALIST

## 2022-02-17 PROCEDURE — 99233 PR SUBSEQUENT HOSPITAL CARE,LEVL III: ICD-10-PCS | Mod: ,,, | Performed by: NURSE PRACTITIONER

## 2022-02-17 RX ORDER — BUMETANIDE 1 MG/1
1 TABLET ORAL 2 TIMES DAILY
Status: DISCONTINUED | OUTPATIENT
Start: 2022-02-17 | End: 2022-02-17 | Stop reason: HOSPADM

## 2022-02-17 RX ORDER — POTASSIUM CHLORIDE 20 MEQ/1
40 TABLET, EXTENDED RELEASE ORAL ONCE
Status: COMPLETED | OUTPATIENT
Start: 2022-02-17 | End: 2022-02-17

## 2022-02-17 RX ORDER — AMMONIUM LACTATE 12 G/100G
CREAM TOPICAL 2 TIMES DAILY
Qty: 280 G | Refills: 0 | Status: SHIPPED | OUTPATIENT
Start: 2022-02-17 | End: 2022-04-11 | Stop reason: SDUPTHER

## 2022-02-17 RX ADMIN — BUMETANIDE 1 MG: 1 TABLET ORAL at 10:02

## 2022-02-17 RX ADMIN — VALSARTAN 320 MG: 160 TABLET, FILM COATED ORAL at 09:02

## 2022-02-17 RX ADMIN — POTASSIUM CHLORIDE 40 MEQ: 1500 TABLET, EXTENDED RELEASE ORAL at 09:02

## 2022-02-17 RX ADMIN — METOPROLOL TARTRATE 50 MG: 50 TABLET, FILM COATED ORAL at 09:02

## 2022-02-17 RX ADMIN — FLUTICASONE FUROATE AND VILANTEROL TRIFENATATE 1 PUFF: 100; 25 POWDER RESPIRATORY (INHALATION) at 07:02

## 2022-02-17 RX ADMIN — AMMONIUM LACTATE: 12 LOTION TOPICAL at 09:02

## 2022-02-17 NOTE — PLAN OF CARE
[facility] - Telemetry      HOME HEALTH ORDERS  FACE TO FACE ENCOUNTER    Patient Name: Alexander Kelly  YOB: 1940    PCP: Niles Martin MD   PCP Address: [address] [place] [address] [facility] [place] [address]  PCP Phone Number: [phone]  PCP Fax: [phone]    Encounter Date: 2/14/22    Admit to Home Health    Diagnoses:  Active Hospital Problems    Diagnosis  POA    *Acute respiratory failure with hypoxia and hypercarbia [J96.01, J96.02]  Yes    Weakness [R53.1]  Yes    Hypokalemia [E87.6]  No    COPD (chronic obstructive pulmonary disease) [J44.9]  Yes    Acute on chronic diastolic congestive heart failure [I50.33]  Yes    Pulmonary hypertension [I27.20]  Yes    Bilateral pleural effusion [J90]  Yes    Obesity (BMI 30-39.9) [E66.9]  Yes    Venous stasis dermatitis of both lower extremities [I87.2]  Yes      Resolved Hospital Problems    Diagnosis Date Resolved POA    SOB (shortness of breath) [R06.02] 02/16/2022 Yes       Follow Up Appointments:  Future Appointments   Date Time Provider Department Center   3/10/2022  1:00 PM Jennifer Alvarado MD [facility]   3/23/2022 11:00 AM Artur Healy MD PhD [facility]       Allergies:Review of patient's allergies indicates:  No Known Allergies    Medications: Review discharge medications with patient and family and provide education.    Current Facility-Administered Medications   Medication Dose Route Frequency Provider Last Rate Last Admin    acetaminophen tablet 650 mg  650 mg Oral Q4H PRN Marci Quinn NP        albuterol-ipratropium 2.5 mg-0.5 mg/3 mL nebulizer solution 3 mL  3 mL Nebulization Q4H PRN Marci Quinn NP   3 mL at 02/15/22 0125    aluminum-magnesium hydroxide-simethicone 200-200-20 mg/5 mL suspension 30 mL  30 mL Oral QID PRN Marci Quinn NP        ammonium lactate 12 % lotion   Topical (Top) BID Miguel Rojo MD   Given at 02/17/22 0909    bumetanide  tablet 1 mg  1 mg Oral BID Chevy Berman, JIMENEZ   1 mg at 02/17/22 1005    cefTRIAXone (ROCEPHIN) 1 g/50 mL D5W IVPB  1 g Intravenous Q24H Miguel Rojo MD   Stopped at 02/16/22 1323    dextrose 10% bolus 125 mL  12.5 g Intravenous PRN Bernardino Valadez MD        dextrose 10% bolus 250 mL  25 g Intravenous PRN Bernardino Valadez MD        enoxaparin injection 40 mg  40 mg Subcutaneous Daily Marci T. Ramirez-Misha, NP   40 mg at 02/16/22 1650    fluticasone furoate-vilanteroL 100-25 mcg/dose diskus inhaler 1 puff  1 puff Inhalation Daily Marci T. Ramirez-JIMENEZ Cabral   1 puff at 02/17/22 0754    glucagon (human recombinant) injection 1 mg  1 mg Intramuscular PRN Marci T. Ramirez-Misha, NP        glucose chewable tablet 16 g  16 g Oral PRN Marci T. Ramirez-Misha, NP        glucose chewable tablet 24 g  24 g Oral PRN Marci T. Ramirez-Misha, NP        melatonin tablet 6 mg  6 mg Oral Nightly PRN Marci T. Ramirez-Misha, NP        metoprolol tartrate (LOPRESSOR) tablet 50 mg  50 mg Oral BID Marci T. Ramirez-Misha, NP   50 mg at 02/17/22 0908    ondansetron disintegrating tablet 4 mg  4 mg Oral Q8H PRN Marci T. Ramirez-Misha, NP        simethicone chewable tablet 80 mg  1 tablet Oral QID PRN Marci T. Ramirez-Misha, NP        sodium chloride 0.9% flush 10 mL  10 mL Intravenous PRN Marci T. Ramirez-Misha, NP        sodium chloride 0.9% flush 10 mL  10 mL Intravenous Q8H PRN Marci T. Ramirez-Misha, NP        valsartan tablet 320 mg  320 mg Oral Daily Marci T. Ramirez-Misha, NP   320 mg at 02/17/22 0908     Current Discharge Medication List      START taking these medications    Details   ammonium lactate (LAC-HYDRIN) 12 % lotion Apply topically 2 (two) times daily.  Qty: 225 g, Refills: 0         CONTINUE these medications which have NOT CHANGED    Details   bisoprolol (ZEBETA) 5 MG tablet Take 5 mg by mouth once daily.      budesonide-formoterol 160-4.5 mcg (SYMBICORT) 160-4.5 mcg/actuation HFAA Inhale 2 puffs into the lungs every  12 (twelve) hours.      bumetanide (BUMEX) 1 MG tablet Take 1 mg by mouth once daily.      potassium chloride SA (K-DUR,KLOR-CON) 20 MEQ tablet Take 20 mEq by mouth once daily.      valsartan (DIOVAN) 320 MG tablet Take 320 mg by mouth once daily.      albuterol (VENTOLIN HFA) 90 mcg/actuation inhaler Inhale 2 puffs into the lungs every 6 (six) hours as needed for Wheezing. Rescue  Qty: 1 Inhaler, Refills: 1    Associated Diagnoses: Bronchitis, acute, with bronchospasm      mupirocin (BACTROBAN) 2 % ointment Apply to affected area 3 times daily  Qty: 22 g, Refills: 1         STOP taking these medications       metOLazone (ZAROXOLYN) 2.5 MG tablet Comments:   Reason for Stopping:         torsemide (DEMADEX) 20 MG Tab Comments:   Reason for Stopping:                 I have seen and examined this patient within the last 30 days. My clinical findings that support the need for the home health skilled services and home bound status are the following:no   Weakness/numbness causing balance and gait disturbance due to Heart Failure making it taxing to leave home.     Diet:   cardiac diet    Labs:  n/a    Referrals/ Consults  Physical Therapy to evaluate and treat. Evaluate for home safety and equipment needs; Establish/upgrade home exercise program. Perform / instruct on therapeutic exercises, gait training, transfer training, and Range of Motion.  Occupational Therapy to evaluate and treat. Evaluate home environment for safety and equipment needs. Perform/Instruct on transfers, ADL training, ROM, and therapeutic exercises.  Aide to provide assistance with personal care, ADLs, and vital signs.    Activities:   activity as tolerated    Nursing:   Agency to admit patient within 24 hours of hospital discharge unless specified on physician order or at patient request    SN to complete comprehensive assessment including routine vital signs. Instruct on disease process and s/s of complications to report to MD. Review/verify  medication list sent home with the patient at time of discharge  and instruct patient/caregiver as needed. Frequency may be adjusted depending on start of care date.     Skilled nurse to perform up to 3 visits PRN for symptoms related to diagnosis    Notify MD if SBP > 160 or < 90; DBP > 90 or < 50; HR > 120 or < 50; Temp > 101; O2 < 88%; Other:       Ok to schedule additional visits based on staff availability and patient request on consecutive days within the home health episode.    When multiple disciplines ordered:    Start of Care occurs on Sunday - Wednesday schedule remaining discipline evaluations as ordered on separate consecutive days following the start of care.    Thursday SOC -schedule subsequent evaluations Friday and Monday the following week.     Friday - Saturday SOC - schedule subsequent discipline evaluations on consecutive days starting Monday of the following week.    For all post-discharge communication and subsequent orders please contact patient's primary care physician. If unable to reach primary care physician or do not receive response within 30 minutes, please contact Jennifer Alvarado for clinical staff order clarification    Miscellaneous   Routine Skin for Bedridden Patients: Instruct patient/caregiver to apply moisture barrier cream to all skin folds and wet areas in perineal area daily and after baths and all bowel movements.  Heart Failure:      SN to instruct on the following:    Instruct on the definition of CHF.   Instruct on the signs/sympoms of CHF to be reported.   Instruct on and monitor daily weights.   Instruct on factors that cause exacerbation.   Instruct on action, dose, schedule, and side effects of medications.   Instruct on diet as prescribed.   Instruct on activity allowed.   Instruct on life-style modifications for life long management of CHF   SN to assess compliance with daily weights, diet, medications, fluid retention,    safety precautions, activities permitted  and life-style modifications.   Additional 1-2 SN visits per week as needed for signs and symptoms     of CHF exacerbation.      For Weight Gain > 2-3 lbs in 1 day or 4-6 lbs over 1 week notify PCP:  Increase bumex (oral diuretic) dose to 2mg bid for 5 days temporarily  Obtain BMP lab test in 3 days  If weight does not decrease by 3 lbs after 5 days of increased diuretic usage: Notify PCP.    Home Health Aide:  Nursing Twice weekly, Physical Therapy Three times weekly, Occupational Therapy Three times weekly and Home Health Aide Twice weekly    Wound Care Orders  no    I certify that this patient is confined to his home and needs intermittent skilled nursing care, physical therapy and occupational therapy.

## 2022-02-17 NOTE — PT/OT/SLP EVAL
"Occupational Therapy   Evaluation    Name: Alexander Kelly  MRN: [id number]  Admitting Diagnosis:  Acute respiratory failure with hypoxia and hypercarbia  Recent Surgery: * No surgery found *      Recommendations:     Discharge Recommendations: other (see comments) (TBD)  Discharge Equipment Recommendations:  shower chair (however pt stating he is not interested in SC at this time)    Assessment:     Alexander Kelly is a 81 y.o. male with a medical diagnosis of Acute respiratory failure with hypoxia and hypercarbia.  He presents with The primary encounter diagnosis was Acute diastolic congestive heart failure. Diagnoses of Shortness of breath, Leg swelling, and Chest pain were also pertinent to this visit.Performance deficits affecting function: impaired cardiopulmonary response to activity,weakness,impaired functional mobilty,decreased safety awareness,decreased upper extremity function,impaired self care skills,impaired endurance.      Pt would benefit from cont OT services in order to maximize functional independence. Limited eval performed this date 2/2 pt declining EOB/OOB ax. Pt stating "I would rather not right now" 2/2 fear of condom catheter coming off, pt educated on importance of OOB ax but still declining. Pt agreeable to scoot self to HOB with SBA and increased time/verbal cues. O2 87-88% after scooting in bed, pt educated on PLB. O2 recovering to 92% with PLB. No c/o pain throughout session.     Rehab Prognosis: Good; patient would benefit from acute skilled OT services to address these deficits and reach maximum level of function.       Plan:     Patient to be seen 3 x/week to address the above listed problems via self-care/home management,therapeutic activities,therapeutic exercises  · Plan of Care Expires: 03/17/22  · Plan of Care Reviewed with: patient    Subjective     Chief Complaint: "They just gave me Bumex and now I know I'm going to have to pee a lot."  Patient/Family Comments/goals: Return " home    Occupational Profile:  Living Environment: Pt lives w/spouse, SSH, THE, WIS w/grab bars  Previous level of function: Independent, recently started using RW for functional mobility  Roles and Routines: Pt drives  Equipment Used at Home:  cane, quad,wheelchair,walker, rolling  Assistance upon Discharge: Wife who is home all day     Pain/Comfort:  · Pain Rating 1: 0/10    Patients cultural, spiritual, Zoroastrian conflicts given the current situation: no    Objective:     Communicated with: nsg prior to session.  Patient found HOB elevated with bed alarm,Condom Catheter,telemetry,peripheral IV upon OT entry to room.    General Precautions: Standard, fall,hearing impaired   Orthopedic Precautions:N/A   Braces: N/A  Respiratory Status: Room air    Occupational Performance:    Bed Mobility:    · Patient completed Scooting/Bridging with stand by assistance with increased time, verbal cues, & effort.     Functional Mobility/Transfers:  · Pt decliningb all EOB/OOB ax, despite increased education/encouragement     Activities of Daily Living:  · Pt declining ADLs at this time.     Cognitive/Visual Perceptual:  Cognitive/Psychosocial Skills:     -       Oriented to: Person, Place, Time and Situation   -       Follows Commands/attention:Follows multistep  commands  -       Safety awareness/insight to disability: impaired   -       Mood/Affect/Coping skills/emotional control: Requires increased encouragement     Physical Exam: Tested at bed level with HOB elevated  Sensation:    -       Intact  Upper Extremity Range of Motion:     -       Right Upper Extremity: WFL shoulder flex, ER  -       Left Upper Extremity: WFL shoulder flex, ER  Upper Extremity Strength:    -       Right Upper Extremity: 3+/5 grossly   -       Left Upper Extremity: 3+/5 grossly   Strength:    -       Right Upper Extremity: Fair  -       Left Upper Extremity: Fair    AMPAC 6 Click ADL:  AMPAC Total Score: 19    Treatment & Education:  Limited  "renetta performed this date 2/2 pt declining EOB/OOB ax.   Pt stating "I would rather not right now" 2/2 fear of condom catheter coming off, pt educated on importance of OOB ax but still declining.   Pt agreeable to scoot self to HOB with SBA and increased time/verbal cues.   O2 87-88% after scooting in bed, pt educated on PLB.   O2 recovering to 92% with PLB. No c/o pain throughout session.   BLE elevated at end of session.   Education:    Patient left HOB elevated with all lines intact, call button in reach, bed alarm on and nsg notified    GOALS:   Multidisciplinary Problems     Occupational Therapy Goals        Problem: Occupational Therapy Goal    Goal Priority Disciplines Outcome Interventions   Occupational Therapy Goal     OT, PT/OT Ongoing, Progressing    Description: Goals to be met by: 3/17/2022     Patient will increase functional independence with ADLs by performing:    UE Dressing with Modified [place].  LE Dressing with Modified [place].  Grooming while standing with Modified [place].  Toileting from toilet with Modified [place] for hygiene and clothing management.   Supine to sit with Modified [place].  Step transfer with Modified [place] & use of RW.  Toilet transfer to toilet with Modified [place] & use of RW.   Increased functional strength to [facility] for self-care.                     History:     Past Medical History:   Diagnosis Date    Bilateral hearing loss     Hypertension        No past surgical history on file.    Time Tracking:     OT Date of Treatment: 02/17/22  OT Start Time: 1011  OT Stop Time: 1025  OT Total Time (min): 14 min    Billable Minutes:Evaluation 14    2/17/2022    "

## 2022-02-17 NOTE — PT/OT/SLP PROGRESS
Physical Therapy Treatment Attempt      Patient Name:  Alexander Kelly   MRN:  [id number]    Patient not seen today secondary to Patient unwilling to participate. Pt to d/c this date.    2/17/2022

## 2022-02-17 NOTE — PLAN OF CARE
Discharge orders noted. Additional clinical references attached.    Patient's discharge instructions given by bedside RN and reviewed via this VN.  Education provided on new medication, diagnosis, and follow-up appointments.  Teach back method used. Patient verbalized understanding.  Family Has concerns and would like to speak to MD, message sent to MD and  Floor nurse notified.     Problem: Adult Inpatient Plan of Care  Goal: Plan of Care Review  Outcome: Met  Goal: Patient-Specific Goal (Individualized)  Outcome: Met  Goal: Absence of Hospital-Acquired Illness or Injury  Outcome: Met  Goal: Optimal Comfort and Wellbeing  Outcome: Met  Goal: Readiness for Transition of Care  Outcome: Met     Problem: Skin Injury Risk Increased  Goal: Skin Health and Integrity  Outcome: Met     Problem: Fall Injury Risk  Goal: Absence of Fall and Fall-Related Injury  Outcome: Met     Problem: Fluid Volume Excess  Goal: Fluid Balance  Outcome: Met     Problem: Gas Exchange Impaired  Goal: Optimal Gas Exchange  Outcome: Met     Problem: Heart Failure Comorbidity  Goal: Maintenance of Heart Failure Symptom Control  Outcome: Met

## 2022-02-17 NOTE — NURSING
"VN reviewed discharge paperwork with pt. Upon review it was noted by wife that the pt looks "a little yellow." The pt was notified that the doctor ordered liver function panel as well as bilirubin tests and the pt refused. He states he "just wants to go home." Wife and grandson at bedside agree. MD notified. Condom catheter removed. Brief applied. Transport requested.  "

## 2022-02-17 NOTE — PLAN OF CARE
"Pharmacist will go over home medications and reasons for medications. VN and bedside nurse to reiterate final discharge instructions.     Future Appointments   Date Time Provider Department Center   3/10/2022  1:00 PM Jennifer Alvarado MD [facility]   3/23/2022 11:00 AM Artur Healy MD PhD [facility]     /61 (BP Location: Right arm, Patient Position: Sitting)   Pulse 79   Temp 98.2 °F (36.8 °C) (Oral)   Resp 18   Ht 5' 11" (1.803 m)   Wt 108.9 kg (240 lb)   SpO2 (!) 90%   BMI 33.47 kg/m²        02/17/22 1102   Final Note   Assessment Type Final Discharge Note   Anticipated Discharge Disposition [facility] Resources/Appts/Education Provided Appointments scheduled and added to AVS;Post-Acute resouces added to AVS   Post-Acute Status   Post-Acute Authorization Home Health   Home Health Status Pending Payor Review  (Orders sent to PHN via fax and email for HH provider. AWAITING HH PROVIDER. Will contact wife once set up to expedite DC.)   Discharge Delays None known at this time        Medication List        START taking these medications      ammonium lactate 12 % lotion  Commonly known as: LAC-HYDRIN  Apply topically 2 (two) times daily.            CONTINUE taking these medications      albuterol 90 mcg/actuation inhaler  Commonly known as: VENTOLIN HFA  Inhale 2 puffs into the lungs every 6 (six) hours as needed for Wheezing. Rescue     bisoprolol 5 MG tablet  Commonly known as: ZEBETA     budesonide-formoterol 160-4.5 mcg 160-4.5 mcg/actuation Hfaa  Commonly known as: SYMBICORT     bumetanide 1 MG tablet  Commonly known as: BUMEX     mupirocin 2 % ointment  Commonly known as: BACTROBAN  Apply to affected area 3 times daily     potassium chloride SA 20 MEQ tablet  Commonly known as: K-DUR,KLOR-CON     valsartan 320 MG tablet  Commonly known as: DIOVAN            STOP taking these medications      metOLazone 2.5 MG tablet  Commonly known as: ZAROXOLYN   "   torsemide 20 MG Tab  Commonly known as: DEMADEX               Where to Get Your Medications        These medications were sent to [facility] [address] [address], [place] [id number]      Hours: Mon-Fri, 8a-5:30p Phone: [phone]   ammonium lactate 12 % lotion

## 2022-02-17 NOTE — PLAN OF CARE
Plan of care discussed with patient verbalized understanding. No shortness of breath noted condom catheter remains in place clear yellow urine noted diuresing adequate amount of urine 1.5ml fluid restriction NSR on the monitor. No sign or symptoms of distress noted.

## 2022-02-17 NOTE — SUBJECTIVE & OBJECTIVE
Past Medical History:   Diagnosis Date    Bilateral hearing loss     Hypertension        No past surgical history on file.    Review of patient's allergies indicates:  No Known Allergies    No current facility-administered medications on file prior to encounter.     Current Outpatient Medications on File Prior to Encounter   Medication Sig    bisoprolol (ZEBETA) 5 MG tablet Take 5 mg by mouth once daily.    budesonide-formoterol 160-4.5 mcg (SYMBICORT) 160-4.5 mcg/actuation HFAA Inhale 2 puffs into the lungs every 12 (twelve) hours.    bumetanide (BUMEX) 1 MG tablet Take 1 mg by mouth once daily.    potassium chloride SA (K-DUR,KLOR-CON) 20 MEQ tablet Take 20 mEq by mouth once daily.    valsartan (DIOVAN) 320 MG tablet Take 320 mg by mouth once daily.    [DISCONTINUED] metOLazone (ZAROXOLYN) 2.5 MG tablet Take 2.5 mg by mouth once daily.    albuterol (VENTOLIN HFA) 90 mcg/actuation inhaler Inhale 2 puffs into the lungs every 6 (six) hours as needed for Wheezing. Rescue    mupirocin (BACTROBAN) 2 % ointment Apply to affected area 3 times daily    [DISCONTINUED] torsemide (DEMADEX) 20 MG Tab      Family History     Problem Relation (Age of Onset)    No Known Problems Mother, Father        Tobacco Use    Smoking status: Never Smoker    Smokeless tobacco: Never Used   Substance and Sexual Activity    Alcohol use: Not on file    Drug use: Not on file    Sexual activity: Not on file     Review of Systems   Constitutional: Negative for diaphoresis.   HENT: Negative.    Eyes: Negative.    Cardiovascular: Negative for chest pain, irregular heartbeat, leg swelling, near-syncope, orthopnea, palpitations, paroxysmal nocturnal dyspnea and syncope.   Respiratory: Positive for sleep disturbances due to breathing. Negative for cough and shortness of breath.    Endocrine: Negative.    Hematologic/Lymphatic: Negative.    Skin: Positive for color change.   Musculoskeletal: Negative.    Gastrointestinal: Negative for  bloating, nausea and vomiting.   Genitourinary: Negative.    Neurological: Negative.    Psychiatric/Behavioral: Negative.    Allergic/Immunologic: Negative.      Objective:     Vital Signs (Most Recent):  Temp: 98.2 °F (36.8 °C) (02/17/22 0814)  Pulse: 79 (02/17/22 0814)  Resp: 18 (02/17/22 0814)  BP: 129/61 (02/17/22 0814)  SpO2: (!) 90 % (02/17/22 0814) Vital Signs (24h Range):  Temp:  [96.4 °F (35.8 °C)-98.2 °F (36.8 °C)] 98.2 °F (36.8 °C)  Pulse:  [] 79  Resp:  [17-18] 18  SpO2:  [90 %-95 %] 90 %  BP: (116-140)/(55-70) 129/61     Weight: 108.9 kg (240 lb)  Body mass index is 33.47 kg/m².    SpO2: (!) 90 %  O2 Device (Oxygen Therapy): room air      Intake/Output Summary (Last 24 hours) at 2/17/2022 1000  Last data filed at 2/17/2022 0459  Gross per 24 hour   Intake 1150 ml   Output 1850 ml   Net -700 ml       Lines/Drains/Airways     Peripheral Intravenous Line                 Peripheral IV - Single Lumen 20 G Left Antecubital -- days                Physical Exam  Constitutional:       General: He is not in acute distress.     Appearance: He is obese. He is not diaphoretic.   HENT:      Head: Atraumatic.   Eyes:      General:         Right eye: No discharge.         Left eye: No discharge.   Cardiovascular:      Rate and Rhythm: Normal rate and regular rhythm.      Heart sounds: Murmur heard.       Pulmonary:      Breath sounds: No rales.   Abdominal:      General: Bowel sounds are normal. There is no distension.      Palpations: Abdomen is soft.   Musculoskeletal:      Right lower leg: No edema.      Left lower leg: No edema.   Skin:     General: Skin is warm and dry.      Capillary Refill: Capillary refill takes 2 to 3 seconds.   Neurological:      Mental Status: He is alert and oriented to person, place, and time.   Psychiatric:         Mood and Affect: Mood normal.         Behavior: Behavior normal.         Thought Content: Thought content normal.         Judgment: Judgment normal.         Significant  Labs:   BMP:   Recent Labs   Lab 02/16/22  0353 02/17/22  0332   GLU 91 100   * 132*   K 3.6 3.5   CL 86* 87*   CO2 36* 33*   BUN 15 20   CREATININE 0.8 0.8   CALCIUM 9.1 9.3   , CMP   Recent Labs   Lab 02/16/22  0353 02/17/22  0332   * 132*   K 3.6 3.5   CL 86* 87*   CO2 36* 33*   GLU 91 100   BUN 15 20   CREATININE 0.8 0.8   CALCIUM 9.1 9.3   ANIONGAP 10 12   ESTGFRAFRICA >60 >60   EGFRNONAA >60 >60   , CBC   No results for input(s): WBC, HGB, HCT, PLT in the last 48 hours., INR No results for input(s): INR, PROTIME in the last 48 hours., Lipid Panel No results for input(s): CHOL, HDL, LDLCALC, TRIG, CHOLHDL in the last 48 hours., Troponin   No results for input(s): TROPONINI in the last 48 hours. and All pertinent lab results from the last 24 hours have been reviewed.    Significant Imaging: Echocardiogram:   Transthoracic echo (TTE) complete (Cupid Only):   Results for orders placed or performed during the hospital encounter of 02/14/22   Echo   Result Value Ref Range    Ascending aorta 4.14 cm    STJ 3.17 cm    AV mean gradient 10 mmHg    Ao peak celio 2.19 m/s    Ao VTI 56.68 cm    IVRT 105.61 msec    IVS 0.98 0.6 - 1.1 cm    LA size 4.78 cm    Left Atrium Major Axis 7.00 cm    Left Atrium Minor Axis 6.24 cm    LVIDd 5.63 3.5 - 6.0 cm    LVIDs 3.22 2.1 - 4.0 cm    LVOT diameter 2.11 cm    LVOT peak VTI 26.86 cm    Posterior Wall 0.90 0.6 - 1.1 cm    MV Peak A Celio 0.83 m/s    E wave deceleration time 237.62 msec    MV Peak E Celio 1.15 m/s    PV Peak D Celio 0.73 m/s    PV Peak S Celio 0.49 m/s    RA Major Axis 6.08 cm    RA Width 4.15 cm    RVDD 4.25 cm    Sinus 3.75 cm    TAPSE 2.30 cm    TR Max Celio 3.56 m/s    TDI LATERAL 0.08 m/s    TDI SEPTAL 0.07 m/s    LA WIDTH 4.11 cm    MV stenosis pressure 1/2 time 68.91 ms    LV Diastolic Volume 155.85 mL    LV Systolic Volume 41.69 mL    LVOT peak celio 1.03 m/s    LA volume (mod) 110.00 cm3    LV LATERAL E/E' RATIO 14.38 m/s    LV SEPTAL E/E' RATIO 16.43 m/s     FS 43 %    LA volume 110.18 cm3    LV mass 204.53 g    Left Ventricle Relative Wall Thickness 0.32 cm    AV valve area 1.66 cm2    AV Velocity Ratio 0.47     AV index (prosthetic) 0.47     MV valve area p 1/2 method 3.19 cm2    E/A ratio 1.39     Mean e' 0.08 m/s    Pulm vein S/D ratio 0.67     LVOT area 3.5 cm2    LVOT stroke volume 93.87 cm3    AV peak gradient 19 mmHg    E/E' ratio 15.33 m/s    Triscuspid Valve Regurgitation Peak Gradient 51 mmHg    BSA 2.39 m2    LV Systolic Volume Index 18.0 mL/m2    LV Diastolic Volume Index 67.18 mL/m2    LA Volume Index 47.5 mL/m2    LV Mass Index 88 g/m2    LA Volume Index (Mod) 47.4 mL/m2    Right Atrial Pressure (from IVC) 15 mmHg    EF 65 %    TV rest pulmonary artery pressure 66 mmHg    Narrative    · The left ventricle is normal in size with normal systolic function.  · The estimated ejection fraction is 65%.  · Grade II left ventricular diastolic dysfunction.  · The ascending aorta is mildly dilated.  · Mild right ventricular enlargement with normal right ventricular   systolic function.  · Mild tricuspid regurgitation.  · The estimated PA systolic pressure is 66 mmHg.  · The IVC is enlarged, measuring >3cm. Elevated central venous pressure   (15 mmHg).  · There is pulmonary hypertension.  · Biatrial enlargement.  · Moderate circumferential pericardial effusion with largest diameter   posterolaterally at 1.5cm.  · There is a left pleural effusion.

## 2022-02-17 NOTE — PROGRESS NOTES
[facility] - Telemetry  Cardiology  Progress Note    Patient Name: Alexander Kelly  MRN: [id number]  Admission Date: 2/14/2022  Hospital Length of Stay: 3 days  Code Status: Prior   Attending Physician: Miguel Rojo, *   Primary Care Physician: Niles Martin MD  Expected Discharge Date: 2/17/2022  Principal Problem:Acute respiratory failure with hypoxia and hypercarbia    Subjective:     Hospital Course:   02/15/2022 Per HPI   02/16/2022 100 cc intake 10.7L output, net -11.9L since admission. Lasix gtt discontinued and will given Lasix 60 mg IV x 1 then hold. Volume status improved. Hemodynamically stable.   02/17/2022 No SOB reported at rest. Diuresed well. Re-enforced dietary sodium restrictions. Bumex 1 mg BID initiated. Will ambulate.         Past Medical History:   Diagnosis Date    Bilateral hearing loss     Hypertension        No past surgical history on file.    Review of patient's allergies indicates:  No Known Allergies    No current facility-administered medications on file prior to encounter.     Current Outpatient Medications on File Prior to Encounter   Medication Sig    bisoprolol (ZEBETA) 5 MG tablet Take 5 mg by mouth once daily.    budesonide-formoterol 160-4.5 mcg (SYMBICORT) 160-4.5 mcg/actuation HFAA Inhale 2 puffs into the lungs every 12 (twelve) hours.    bumetanide (BUMEX) 1 MG tablet Take 1 mg by mouth once daily.    potassium chloride SA (K-DUR,KLOR-CON) 20 MEQ tablet Take 20 mEq by mouth once daily.    valsartan (DIOVAN) 320 MG tablet Take 320 mg by mouth once daily.    [DISCONTINUED] metOLazone (ZAROXOLYN) 2.5 MG tablet Take 2.5 mg by mouth once daily.    albuterol (VENTOLIN HFA) 90 mcg/actuation inhaler Inhale 2 puffs into the lungs every 6 (six) hours as needed for Wheezing. Rescue    mupirocin (BACTROBAN) 2 % ointment Apply to affected area 3 times daily    [DISCONTINUED] torsemide (DEMADEX) 20 MG Tab      Family History     Problem Relation (Age of Onset)    No Known  Problems Mother, Father        Tobacco Use    Smoking status: Never Smoker    Smokeless tobacco: Never Used   Substance and Sexual Activity    Alcohol use: Not on file    Drug use: Not on file    Sexual activity: Not on file     Review of Systems   Constitutional: Negative for diaphoresis.   HENT: Negative.    Eyes: Negative.    Cardiovascular: Negative for chest pain, irregular heartbeat, leg swelling, near-syncope, orthopnea, palpitations, paroxysmal nocturnal dyspnea and syncope.   Respiratory: Positive for sleep disturbances due to breathing. Negative for cough and shortness of breath.    Endocrine: Negative.    Hematologic/Lymphatic: Negative.    Skin: Positive for color change.   Musculoskeletal: Negative.    Gastrointestinal: Negative for bloating, nausea and vomiting.   Genitourinary: Negative.    Neurological: Negative.    Psychiatric/Behavioral: Negative.    Allergic/Immunologic: Negative.      Objective:     Vital Signs (Most Recent):  Temp: 98.2 °F (36.8 °C) (02/17/22 0814)  Pulse: 79 (02/17/22 0814)  Resp: 18 (02/17/22 0814)  BP: 129/61 (02/17/22 0814)  SpO2: (!) 90 % (02/17/22 0814) Vital Signs (24h Range):  Temp:  [96.4 °F (35.8 °C)-98.2 °F (36.8 °C)] 98.2 °F (36.8 °C)  Pulse:  [] 79  Resp:  [17-18] 18  SpO2:  [90 %-95 %] 90 %  BP: (116-140)/(55-70) 129/61     Weight: 108.9 kg (240 lb)  Body mass index is 33.47 kg/m².    SpO2: (!) 90 %  O2 Device (Oxygen Therapy): room air      Intake/Output Summary (Last 24 hours) at 2/17/2022 1000  Last data filed at 2/17/2022 0459  Gross per 24 hour   Intake 1150 ml   Output 1850 ml   Net -700 ml       Lines/Drains/Airways     Peripheral Intravenous Line                 Peripheral IV - Single Lumen 20 G Left Antecubital -- days                Physical Exam  Constitutional:       General: He is not in acute distress.     Appearance: He is obese. He is not diaphoretic.   HENT:      Head: Atraumatic.   Eyes:      General:         Right eye: No discharge.          Left eye: No discharge.   Cardiovascular:      Rate and Rhythm: Normal rate and regular rhythm.      Heart sounds: Murmur heard.       Pulmonary:      Breath sounds: No rales.   Abdominal:      General: Bowel sounds are normal. There is no distension.      Palpations: Abdomen is soft.   Musculoskeletal:      Right lower leg: No edema.      Left lower leg: No edema.   Skin:     General: Skin is warm and dry.      Capillary Refill: Capillary refill takes 2 to 3 seconds.   Neurological:      Mental Status: He is alert and oriented to person, place, and time.   Psychiatric:         Mood and Affect: Mood normal.         Behavior: Behavior normal.         Thought Content: Thought content normal.         Judgment: Judgment normal.         Significant Labs:   BMP:   Recent Labs   Lab 02/16/22  0353 02/17/22  0332   GLU 91 100   * 132*   K 3.6 3.5   CL 86* 87*   CO2 36* 33*   BUN 15 20   CREATININE 0.8 0.8   CALCIUM 9.1 9.3   , CMP   Recent Labs   Lab 02/16/22  0353 02/17/22  0332   * 132*   K 3.6 3.5   CL 86* 87*   CO2 36* 33*   GLU 91 100   BUN 15 20   CREATININE 0.8 0.8   CALCIUM 9.1 9.3   ANIONGAP 10 12   ESTGFRAFRICA >60 >60   EGFRNONAA >60 >60   , CBC   No results for input(s): WBC, HGB, HCT, PLT in the last 48 hours., INR No results for input(s): INR, PROTIME in the last 48 hours., Lipid Panel No results for input(s): CHOL, HDL, LDLCALC, TRIG, CHOLHDL in the last 48 hours., Troponin   No results for input(s): TROPONINI in the last 48 hours. and All pertinent lab results from the last 24 hours have been reviewed.    Significant Imaging: Echocardiogram:   Transthoracic echo (TTE) complete (Cupid Only):   Results for orders placed or performed during the hospital encounter of 02/14/22   Echo   Result Value Ref Range    Ascending aorta 4.14 cm    STJ 3.17 cm    AV mean gradient 10 mmHg    Ao peak priscilla 2.19 m/s    Ao VTI 56.68 cm    IVRT 105.61 msec    IVS 0.98 0.6 - 1.1 cm    LA size 4.78 cm    Left Atrium  Major Axis 7.00 cm    Left Atrium Minor Axis 6.24 cm    LVIDd 5.63 3.5 - 6.0 cm    LVIDs 3.22 2.1 - 4.0 cm    LVOT diameter 2.11 cm    LVOT peak VTI 26.86 cm    Posterior Wall 0.90 0.6 - 1.1 cm    MV Peak A Celio 0.83 m/s    E wave deceleration time 237.62 msec    MV Peak E Celio 1.15 m/s    PV Peak D Celio 0.73 m/s    PV Peak S Celio 0.49 m/s    RA Major Axis 6.08 cm    RA Width 4.15 cm    RVDD 4.25 cm    Sinus 3.75 cm    TAPSE 2.30 cm    TR Max Celio 3.56 m/s    TDI LATERAL 0.08 m/s    TDI SEPTAL 0.07 m/s    LA WIDTH 4.11 cm    MV stenosis pressure 1/2 time 68.91 ms    LV Diastolic Volume 155.85 mL    LV Systolic Volume 41.69 mL    LVOT peak celio 1.03 m/s    LA volume (mod) 110.00 cm3    LV LATERAL E/E' RATIO 14.38 m/s    LV SEPTAL E/E' RATIO 16.43 m/s    FS 43 %    LA volume 110.18 cm3    LV mass 204.53 g    Left Ventricle Relative Wall Thickness 0.32 cm    AV valve area 1.66 cm2    AV Velocity Ratio 0.47     AV index (prosthetic) 0.47     MV valve area p 1/2 method 3.19 cm2    E/A ratio 1.39     Mean e' 0.08 m/s    Pulm vein S/D ratio 0.67     LVOT area 3.5 cm2    LVOT stroke volume 93.87 cm3    AV peak gradient 19 mmHg    E/E' ratio 15.33 m/s    Triscuspid Valve Regurgitation Peak Gradient 51 mmHg    BSA 2.39 m2    LV Systolic Volume Index 18.0 mL/m2    LV Diastolic Volume Index 67.18 mL/m2    LA Volume Index 47.5 mL/m2    LV Mass Index 88 g/m2    LA Volume Index (Mod) 47.4 mL/m2    Right Atrial Pressure (from IVC) 15 mmHg    EF 65 %    TV rest pulmonary artery pressure 66 mmHg    Narrative    · The left ventricle is normal in size with normal systolic function.  · The estimated ejection fraction is 65%.  · Grade II left ventricular diastolic dysfunction.  · The ascending aorta is mildly dilated.  · Mild right ventricular enlargement with normal right ventricular   systolic function.  · Mild tricuspid regurgitation.  · The estimated PA systolic pressure is 66 mmHg.  · The IVC is enlarged, measuring >3cm. Elevated central  venous pressure   (15 mmHg).  · There is pulmonary hypertension.  · Biatrial enlargement.  · Moderate circumferential pericardial effusion with largest diameter   posterolaterally at 1.5cm.  · There is a left pleural effusion.        Assessment and Plan:     Brief HPI: Patient seen this morning on rounds without CV complaint. POC discussed with patient and re-enforced dietary sodium restrictions.     Bilateral pleural effusion  Expect improvement with diuresis     Acute on chronic diastolic congestive heart failure   02/14/22    Echo    Interpretation Summary  · The left ventricle is normal in size with normal systolic function.  · The estimated ejection fraction is 65%.  · Grade II left ventricular diastolic dysfunction.  · The ascending aorta is mildly dilated.  · Mild right ventricular enlargement with normal right ventricular systolic function.  · Mild tricuspid regurgitation.  · The estimated PA systolic pressure is 66 mmHg.  · The IVC is enlarged, measuring >3cm. Elevated central venous pressure (15 mmHg).  · There is pulmonary hypertension.  · Biatrial enlargement.  · Moderate circumferential pericardial effusion with largest diameter posterolaterally at 1.5cm.  · There is a left pleural effusion.    Recent Labs   Lab 02/14/22 2007   *   .  ADHF in setting of dietary sodium indiscretion   Continue BB and ARB   Net -14 L from admission   Converted Bumex to 1mg BID  Volume status markedly improved   Dietary consult   Daily I&O  Daily weights   Recommend  for CHF monitoring and ongoing education    Ambulate patient in [place], if at baseline he can be discharged today     Obesity (BMI 30-39.9)  Weight loss encouraged     Venous stasis dermatitis of both lower extremities  Chronic and stable        VTE Risk Mitigation (From admission, onward)         Ordered     enoxaparin injection 40 mg  Daily         02/14/22 2331     IP VTE HIGH RISK PATIENT  Once         02/14/22 2331     Place sequential compression  device  Until discontinued         02/14/22 1255                Chevy Berman NP  Cardiology  [place] - Telemetry

## 2022-02-17 NOTE — ASSESSMENT & PLAN NOTE
02/14/22    Echo    Interpretation Summary  · The left ventricle is normal in size with normal systolic function.  · The estimated ejection fraction is 65%.  · Grade II left ventricular diastolic dysfunction.  · The ascending aorta is mildly dilated.  · Mild right ventricular enlargement with normal right ventricular systolic function.  · Mild tricuspid regurgitation.  · The estimated PA systolic pressure is 66 mmHg.  · The IVC is enlarged, measuring >3cm. Elevated central venous pressure (15 mmHg).  · There is pulmonary hypertension.  · Biatrial enlargement.  · Moderate circumferential pericardial effusion with largest diameter posterolaterally at 1.5cm.  · There is a left pleural effusion.    Recent Labs   Lab 02/14/22 2007   *   .  ADHF in setting of dietary sodium indiscretion   Continue BB and ARB   Net -14 L from admission   Converted Bumex to 1mg BID  Volume status markedly improved   Dietary consult   Daily I&O  Daily weights   Recommend  for CHF monitoring and ongoing education    Ambulate patient in [place], if at baseline he can be discharged today

## 2022-02-17 NOTE — PLAN OF CARE
"Pt would benefit from cont OT services in order to maximize functional independence. Limited eval performed this date 2/2 pt declining EOB/OOB ax. Pt stating "I would rather not right now" 2/2 fear of condom catheter coming off, pt educated on importance of OOB ax but still declining. Pt agreeable to scoot self to HOB with SBA and increased time/verbal cues. O2 87-88% after scooting in bed, pt educated on PLB. O2 recovering to 92% with PLB. No c/o pain throughout session.     Problem: Occupational Therapy Goal  Goal: Occupational Therapy Goal  Description: Goals to be met by: 3/17/2022     Patient will increase functional independence with ADLs by performing:    UE Dressing with Modified [place].  LE Dressing with Modified [place].  Grooming while standing with Modified [place].  Toileting from toilet with Modified [place] for hygiene and clothing management.   Supine to sit with Modified [place].  Step transfer with Modified [place] & use of RW.  Toilet transfer to toilet with Modified [place] & use of RW.   Increased functional strength to WFL for self-care.    Outcome: Ongoing, Progressing     "

## 2022-02-17 NOTE — PROGRESS NOTES
02/17/2022 @ 1:52 PM- SANDRAW attempted to contact pt via room phone to complete SDOH questionnaire and liaison assessment. ORALIA received no answer at this time. RSW will follow up with patient at a later time to complete initial visit assessment.      IP Liaison - Initial Visit Note    Patient: Alexander Kelly  MRN:  [id number]  Date of Service:  2/17/2022  Completed by:  ORALIA Nelson    Reason for Visit   Patient presents with    IP Liaison Initial Visit       RSW contacted pt spouse Chelsea via room phone in order to complete SDOH questionnaire and liaison assessment.  Chelsea has identified no social barriers to care. Per Chelsea, pt is not in need of resources at this time.    The following were addressed during this visit:  - Review SDOH Questions   - Complete patient assessment   - Complete initial visit with patient        Patient Summary     IP Liaison Patient Assessment    General  Level of Caregiver support: Member independent and does not need caregiver assistance  Have you had to make a decision between paying for any of the following in the last 2 months?: None  Transportation means: Family  Assessments  Was the PHQ Depression Screening completed this visit?: No  Was the JONG-7 Screening completed this visit?: No         ORALIA Nelson

## 2022-02-18 ENCOUNTER — TELEPHONE (OUTPATIENT)
Dept: CARDIOLOGY | Facility: CLINIC | Age: 82
End: 2022-02-18
Payer: MEDICARE

## 2022-02-18 ENCOUNTER — PATIENT OUTREACH (OUTPATIENT)
Dept: ADMINISTRATIVE | Facility: OTHER | Age: 82
End: 2022-02-18
Payer: MEDICARE

## 2022-02-18 NOTE — TELEPHONE ENCOUNTER
Pt wife would like for the patient to come in on 3/4/2021. Advised the patient we are booked and 3/23 is the next available.   ----- Message from Fortino Jonas sent at 2/18/2022 12:49 PM CST -----  Regarding: Sooner Appointment  PT wants to be seen on March 4 if possible.  PT can be reached @ [phone]        Thanks

## 2022-02-18 NOTE — PROGRESS NOTES
IP Liaison - Final Visit Note    Patient: Alexander Kelly  MRN:  [id number]  Date of Service:  2/18/2022  Completed by:  ORALIA Nelson    Reason for Visit   Patient presents with    IP Liaison Chart Review     Patient discharged from hospital before RSW was able to complete follow-up visit.        Patient Summary     Discharge Date: 2/17/2022  Discharge telephone number/address: [phone] / [address] [place] [address]  Follow up provider: Jennifer Alvarado MD  Follow up appointments: 3/10/2022 @ 1:00pm  Home Health agency & telephone number: [facility] per N  DME ordered &  name: n/a  Assigned OPCM RN/SW: n/a  Report sent to follow up team (PCP/OPCM) via in basket message: n/a  Community Resources arranged including agency name & contact info: n/a      ORALIA Nelson

## 2022-02-21 NOTE — HOSPITAL COURSE
"Mr. Kelly presented with acute hypoxic respiratory failure due to volume overload with acute on chronic diastolic heart failure exacerbation. He was initiated on CHF pathway with IV lasix. Good diuresis with improvement in hypoxia and dyspnea. Initially required lasix gtt which was then de-escalated to IV pushes then PO bumex. He will need close outpatient follow up with Cardiology. Arranging for home health at time of discharge.    /67 (BP Location: Right arm, Patient Position: Sitting)   Pulse 65   Temp 98.4 °F (36.9 °C) (Oral)   Resp 18   Ht 5' 11" (1.803 m)   Wt 108.9 kg (240 lb)   SpO2 (!) 93%   BMI 33.47 kg/m²     Physical Exam  Vitals and nursing note reviewed.   Constitutional:       General: He is not in acute distress.     Appearance: Normal appearance. He is ill-appearing.      Interventions: Nasal cannula in place.   HENT:      Head: Normocephalic and atraumatic.      Right Ear: No decreased hearing noted.      Left Ear: No decreased hearing noted.      Ears:      Comments: Bilateral hearing aids present      Mouth/Throat:      Mouth: Mucous membranes are dry.   Eyes:      Extraocular Movements: Extraocular movements intact.      Pupils: Pupils are equal, round, and reactive to light.   Cardiovascular:      Rate and Rhythm: Normal rate and regular rhythm.      Pulses: Normal pulses.      Heart sounds: Normal heart sounds. No murmur heard.  No friction rub. No gallop.    Pulmonary:      Effort: No tachypnea, accessory muscle usage or respiratory distress.      Breath sounds: Examination of the right-middle field reveals rhonchi. Examination of the left-middle field reveals rhonchi. Examination of the right-lower field reveals decreased breath sounds. Examination of the left-lower field reveals decreased breath sounds. Decreased breath sounds and rhonchi present. No wheezing.      Comments: Mild expiratory wheeze appreciated  Abdominal:      General: Bowel sounds are normal. There is no " distension.      Palpations: Abdomen is soft.      Tenderness: There is no abdominal tenderness. There is no guarding.   Musculoskeletal:         General: No swelling.      Cervical back: Normal range of motion and neck supple.      Right lower le+ Edema present.      Left lower le+ Edema present.   Skin:     General: Skin is warm and dry.      Capillary Refill: Capillary refill takes less than 2 seconds.      Findings: Erythema and wound present. No bruising or rash.      Comments: Ulcers noted L and R lower extremity   Erythema noted to bilateral hands (report chronic)   Neurological:      General: No focal deficit present.      Mental Status: He is alert and oriented to person, place, and time.      GCS: GCS eye subscore is 4. GCS verbal subscore is 4. GCS motor subscore is 6.   Psychiatric:         Mood and Affect: Mood normal.         Behavior: Behavior normal. Behavior is cooperative.

## 2022-02-21 NOTE — DISCHARGE SUMMARY
[facility] Medicine  Discharge Summary      Patient Name: Alexander Kelly  MRN: [id number]  Patient Class: IP- Inpatient  Admission Date: 2/14/2022  Hospital Length of Stay: 3 days  Discharge Date and Time: 2/17/2022  4:13 PM  Attending Physician: No att. providers found   Discharging Provider: Miguel Rojo MD  Primary Care Provider: Niles Martin MD      HPI:   Alexander Kelly is an 82 y/o male with PMHx HTN, H\FpEF, COPD, bilateral hearing loss, and obesity presents to [facility] ED from his doctor office for SOB and CHUN. Patient reports he has increased dyspnea on exertion, shortness of breath, and bilateral lower leg swelling. Patient reports he had chronic SOB, but the CHUN has been worsening since Thursday. Patient does not use home oxygen. Patient reports he had a CXR today and was told he has CHF and  to come be evaluated in the ED. Echo completed today PTA revealed diastolic dysfunction, PHTN, with moderate pericardial effusion and EF 65%. Patient denies chest pain, palpitations, nausea, or vomiting. Patient denies fevers or chills. Patient uses a walker to ambulate.In ED: Na 132, Chloride 91, CO2 32, . Troponin 0.016. CXR with Findings suggesting vascular congestion/edema. Left greater than right pleural effusions with bibasilar atelectasis. Cardiology consulted. Patient will be placed into hospital medicine observation services under my care in collaboration with Dr. Darrian Lawrence.         * No surgery found *      Hospital Course:   Mr. Kelly presented with acute hypoxic respiratory failure due to volume overload with acute on chronic diastolic heart failure exacerbation. He was initiated on CHF pathway with IV lasix. Good diuresis with improvement in hypoxia and dyspnea. Initially required lasix gtt which was then de-escalated to IV pushes then PO bumex. He will need close outpatient follow up with Cardiology. Arranging for home health at time of discharge.    /67 (BP  "Location: Right arm, Patient Position: Sitting)   Pulse 65   Temp 98.4 °F (36.9 °C) (Oral)   Resp 18   Ht 5' 11" (1.803 m)   Wt 108.9 kg (240 lb)   SpO2 (!) 93%   BMI 33.47 kg/m²     Physical Exam  Vitals and nursing note reviewed.   Constitutional:       General: He is not in acute distress.     Appearance: Normal appearance. He is ill-appearing.      Interventions: Nasal cannula in place.   HENT:      Head: Normocephalic and atraumatic.      Right Ear: No decreased hearing noted.      Left Ear: No decreased hearing noted.      Ears:      Comments: Bilateral hearing aids present      Mouth/Throat:      Mouth: Mucous membranes are dry.   Eyes:      Extraocular Movements: Extraocular movements intact.      Pupils: Pupils are equal, round, and reactive to light.   Cardiovascular:      Rate and Rhythm: Normal rate and regular rhythm.      Pulses: Normal pulses.      Heart sounds: Normal heart sounds. No murmur heard.  No friction rub. No gallop.    Pulmonary:      Effort: No tachypnea, accessory muscle usage or respiratory distress.      Breath sounds: Examination of the right-middle field reveals rhonchi. Examination of the left-middle field reveals rhonchi. Examination of the right-lower field reveals decreased breath sounds. Examination of the left-lower field reveals decreased breath sounds. Decreased breath sounds and rhonchi present. No wheezing.      Comments: Mild expiratory wheeze appreciated  Abdominal:      General: Bowel sounds are normal. There is no distension.      Palpations: Abdomen is soft.      Tenderness: There is no abdominal tenderness. There is no guarding.   Musculoskeletal:         General: No swelling.      Cervical back: Normal range of motion and neck supple.      Right lower le+ Edema present.      Left lower le+ Edema present.   Skin:     General: Skin is warm and dry.      Capillary Refill: Capillary refill takes less than 2 seconds.      Findings: Erythema and wound " present. No bruising or rash.      Comments: Ulcers noted L and R lower extremity   Erythema noted to bilateral hands (report chronic)   Neurological:      General: No focal deficit present.      Mental Status: He is alert and oriented to person, place, and time.      GCS: GCS eye subscore is 4. GCS verbal subscore is 4. GCS motor subscore is 6.   Psychiatric:         Mood and Affect: Mood normal.         Behavior: Behavior normal. Behavior is cooperative.        Goals of Care Treatment Preferences:  Code Status: Full Code      Consults:   Consults (From admission, onward)        Status Ordering Provider     [facility]  Once        Provider:  (Not yet assigned)    Completed CARMELO CARRINGTON     Inpatient consult to Registered Dietitian/Nutritionist  Once        Provider:  (Not yet assigned)    Completed CARMELO CARRINGTON          No new Assessment & Plan notes have been filed under this hospital service since the last note was generated.  Service: Hospital Medicine    Final Active Diagnoses:    Diagnosis Date Noted POA    PRINCIPAL PROBLEM:  Acute respiratory failure with hypoxia and hypercarbia [J96.01, J96.02] 02/14/2022 Yes    Weakness [R53.1] 02/16/2022 Yes    Hypokalemia [E87.6] 02/16/2022 No    COPD (chronic obstructive pulmonary disease) [J44.9] 02/15/2022 Yes    Acute on chronic diastolic congestive heart failure [I50.33] 02/14/2022 Yes    Pulmonary hypertension [I27.20] 02/14/2022 Yes    Bilateral pleural effusion [J90] 02/14/2022 Yes    Obesity (BMI 30-39.9) [E66.9] 02/09/2022 Yes    Venous stasis dermatitis of both lower extremities [I87.2] 02/09/2022 Yes      Problems Resolved During this Admission:    Diagnosis Date Noted Date Resolved POA    SOB (shortness of breath) [R06.02] 02/09/2022 02/16/2022 Yes       Discharged Condition: good    Disposition: Home or Self Care    Follow Up:   Follow-up Information     [facility]. Go on 3/10/2022.    Specialty: [facility]  Why: FOLLOW UP WITH PCP AFTER [facility] APPT  Contact information:  [address]  [facility] [address] [phone]  Additional information:  Please park in Lot C or D and use [address]. Take Medical Office Bldg elevators.           [facility]. Go on 3/23/2022.    Specialty: Cardiology  Why: FOLLOW UP WITH CARDIOLOGIST AFTER DISCHARGE Dr. Artur Healy  Contact information:  [address]  [facility] [address] [phone]  Additional information:  Please park in garage and take elevator to the 8th floor           Schedule an appointment as soon as possible for a visit to follow up.    Why: As needed, HOME HEALTH, If date/time not listed,  will contact, CLINICALS FAXED TO OFFICE                     Patient Instructions:      Ambulatory referral/consult to Cardiology   Standing Status: Future   Referral Priority: Routine Referral Type: Consultation   Referral Reason: Specialty Services Required   Requested Specialty: Cardiology   Number of Visits Requested: 1     Call MD for:  temperature >100.4     Call MD for:  persistent nausea and vomiting or diarrhea     Call MD for:  severe uncontrolled pain     Call MD for:  redness, tenderness, or signs of infection (pain, swelling, redness, odor or green/yellow discharge around incision site)     Call MD for:  difficulty breathing or increased cough     Call MD for:  severe persistent headache     Call MD for:  worsening rash     Call MD for:  persistent dizziness, light-headedness, or visual disturbances     Call MD for:  increased confusion or weakness       Significant Diagnostic Studies: Labs:   CMP No results for input(s): NA, K, CL, CO2, GLU, BUN, CREATININE, CALCIUM, PROT, ALBUMIN, BILITOT, ALKPHOS, AST, ALT, ANIONGAP, ESTGFRAFRICA, EGFRNONAA in the last 48 hours., CBC No results for input(s): WBC, HGB, HCT, PLT in the last 48 hours., INR No results found for:  INR, PROTIME, Lipid Panel No results found for: CHOL, HDL, LDLCALC, TRIG, CHOLHDL, Troponin   Recent Labs   Lab 02/14/22  2007 02/15/22  0416   TROPONINI 0.016 0.016   , A1C: No results for input(s): HGBA1C in the last 4320 hours. and All labs within the past 24 hours have been reviewed  Radiology: X-Ray: CXR:   EXAMINATION:  XR CHEST AP PORTABLE     CLINICAL HISTORY:  CHF;     TECHNIQUE:  Single frontal view of the chest was performed.     COMPARISON:  Chest radiograph performed 01/11/2019.     FINDINGS:  Dense opacification of the left mid lower lung zone which relates to pleural effusion with atelectasis, noting that superimposed aspiration ammonia difficult to exclude.  Similar but, less pronounced findings noted at the right lung base.     Prominent interstitial markings suggest vascular congestion/edema.     Enlargement of the cardiac silhouette is again suggested.     No definite pneumothorax.  No acute findings are suggested in the visualized abdomen.     Osseous and soft tissue structures appear without definite acute change     Impression:     Findings suggesting vascular congestion/edema.  Left greater than right pleural effusions with bibasilar atelectasis.  Superimposed aspiration pneumonia not excluded.     Ultrasound: EXAMINATION:  US LOWER EXTREMITY VEINS BILATERAL     CLINICAL HISTORY:  Other specified soft tissue disorders     TECHNIQUE:  Duplex and color flow Doppler and dynamic compression was performed of the bilateral lower extremity veins was performed.     COMPARISON:  None     FINDINGS:  Graded compressive real-time color Doppler duplex sonographic evaluation of the bilateral lower extremity visualized deep veins accomplished with grayscale and spectral analysis.     Unremarkable evaluation seen with respect to right and left common femoral, femoral, popliteal, posterior tibial, anterior tibial, peroneal, and more proximal greater saphenous veins.  Normal flow, compressibility, and  augmentation.     Note made of soft tissue edema bilateral lower legs.  Note made of enlarged left groin lymph node with measurements of 1.2 x 4.2 x 3.7 cm.  Clinical correlation.     Impression:     1. No findings of right or left lower extremity deep venous thrombophlebitis  2. Bilateral lower extremity soft tissue edema.  3. Prominent enlarged left groin lymph node.     Cardiac Graphics: Echocardiogram:   Transthoracic echo (TTE) complete (Cupid Only):   Results for orders placed or performed during the hospital encounter of 02/14/22   Echo   Result Value Ref Range    Ascending aorta 4.14 cm    STJ 3.17 cm    AV mean gradient 10 mmHg    Ao peak celio 2.19 m/s    Ao VTI 56.68 cm    IVRT 105.61 msec    IVS 0.98 0.6 - 1.1 cm    LA size 4.78 cm    Left Atrium Major Axis 7.00 cm    Left Atrium Minor Axis 6.24 cm    LVIDd 5.63 3.5 - 6.0 cm    LVIDs 3.22 2.1 - 4.0 cm    LVOT diameter 2.11 cm    LVOT peak VTI 26.86 cm    Posterior Wall 0.90 0.6 - 1.1 cm    MV Peak A Celio 0.83 m/s    E wave deceleration time 237.62 msec    MV Peak E Celio 1.15 m/s    PV Peak D Celio 0.73 m/s    PV Peak S Celio 0.49 m/s    RA Major Axis 6.08 cm    RA Width 4.15 cm    RVDD 4.25 cm    Sinus 3.75 cm    TAPSE 2.30 cm    TR Max Celio 3.56 m/s    TDI LATERAL 0.08 m/s    TDI SEPTAL 0.07 m/s    LA WIDTH 4.11 cm    MV stenosis pressure 1/2 time 68.91 ms    LV Diastolic Volume 155.85 mL    LV Systolic Volume 41.69 mL    LVOT peak celio 1.03 m/s    LA volume (mod) 110.00 cm3    LV LATERAL E/E' RATIO 14.38 m/s    LV SEPTAL E/E' RATIO 16.43 m/s    FS 43 %    LA volume 110.18 cm3    LV mass 204.53 g    Left Ventricle Relative Wall Thickness 0.32 cm    AV valve area 1.66 cm2    AV Velocity Ratio 0.47     AV index (prosthetic) 0.47     MV valve area p 1/2 method 3.19 cm2    E/A ratio 1.39     Mean e' 0.08 m/s    Pulm vein S/D ratio 0.67     LVOT area 3.5 cm2    LVOT stroke volume 93.87 cm3    AV peak gradient 19 mmHg    E/E' ratio 15.33 m/s    Triscuspid Valve  Regurgitation Peak Gradient 51 mmHg    BSA 2.39 m2    LV Systolic Volume Index 18.0 mL/m2    LV Diastolic Volume Index 67.18 mL/m2    LA Volume Index 47.5 mL/m2    LV Mass Index 88 g/m2    LA Volume Index (Mod) 47.4 mL/m2    Right Atrial Pressure (from IVC) 15 mmHg    EF 65 %    TV rest pulmonary artery pressure 66 mmHg    Narrative    · The left ventricle is normal in size with normal systolic function.  · The estimated ejection fraction is 65%.  · Grade II left ventricular diastolic dysfunction.  · The ascending aorta is mildly dilated.  · Mild right ventricular enlargement with normal right ventricular   systolic function.  · Mild tricuspid regurgitation.  · The estimated PA systolic pressure is 66 mmHg.  · The IVC is enlarged, measuring >3cm. Elevated central venous pressure   (15 mmHg).  · There is pulmonary hypertension.  · Biatrial enlargement.  · Moderate circumferential pericardial effusion with largest diameter   posterolaterally at 1.5cm.  · There is a left pleural effusion.          Pending Diagnostic Studies:     None         Medications:  Reconciled Home Medications:      Medication List      START taking these medications    ammonium lactate 12 % Crea  Apply topically 2 (two) times daily.        CONTINUE taking these medications    albuterol 90 mcg/actuation inhaler  Commonly known as: VENTOLIN HFA  Inhale 2 puffs into the lungs every 6 (six) hours as needed for Wheezing. Rescue     bisoprolol 5 MG tablet  Commonly known as: ZEBETA  Take 5 mg by mouth once daily.     budesonide-formoterol 160-4.5 mcg 160-4.5 mcg/actuation Hfaa  Commonly known as: SYMBICORT  Inhale 2 puffs into the lungs every 12 (twelve) hours.     bumetanide 1 MG tablet  Commonly known as: BUMEX  Take 1 mg by mouth once daily.     mupirocin 2 % ointment  Commonly known as: BACTROBAN  Apply to affected area 3 times daily     potassium chloride SA 20 MEQ tablet  Commonly known as: K-DUR,KLOR-CON  Take 20 mEq by mouth once daily.      valsartan 320 MG tablet  Commonly known as: DIOVAN  Take 320 mg by mouth once daily.        STOP taking these medications    metOLazone 2.5 MG tablet  Commonly known as: ZAROXOLYN     torsemide 20 MG Tab  Commonly known as: DEMADEX            Indwelling Lines/Drains at time of discharge:   Lines/Drains/Airways     None                 Time spent on the discharge of patient: 34 minutes         Miguel Rojo MD  Department of Hospital Medicine  [facility]

## 2022-03-10 ENCOUNTER — OFFICE VISIT (OUTPATIENT)
Dept: PRIMARY CARE CLINIC | Facility: CLINIC | Age: 82
End: 2022-03-10
Payer: MEDICARE

## 2022-03-10 ENCOUNTER — LAB VISIT (OUTPATIENT)
Dept: LAB | Facility: HOSPITAL | Age: 82
End: 2022-03-10
Attending: INTERNAL MEDICINE
Payer: MEDICARE

## 2022-03-10 VITALS
OXYGEN SATURATION: 97 % | DIASTOLIC BLOOD PRESSURE: 66 MMHG | WEIGHT: 219.38 LBS | TEMPERATURE: 98 F | BODY MASS INDEX: 30.59 KG/M2 | HEART RATE: 66 BPM | SYSTOLIC BLOOD PRESSURE: 134 MMHG

## 2022-03-10 DIAGNOSIS — I50.33 ACUTE ON CHRONIC DIASTOLIC CONGESTIVE HEART FAILURE: Primary | ICD-10-CM

## 2022-03-10 DIAGNOSIS — I87.8 VENOUS STASIS: ICD-10-CM

## 2022-03-10 DIAGNOSIS — I87.2 VENOUS STASIS DERMATITIS OF BOTH LOWER EXTREMITIES: ICD-10-CM

## 2022-03-10 DIAGNOSIS — I50.33 ACUTE ON CHRONIC DIASTOLIC CONGESTIVE HEART FAILURE: ICD-10-CM

## 2022-03-10 DIAGNOSIS — I89.0 LYMPHEDEMA OF BOTH LOWER EXTREMITIES: ICD-10-CM

## 2022-03-10 PROBLEM — L97.221 VENOUS STASIS ULCER OF LEFT CALF LIMITED TO BREAKDOWN OF SKIN WITH VARICOSE VEINS: Status: RESOLVED | Noted: 2022-02-09 | Resolved: 2022-03-10

## 2022-03-10 PROBLEM — E87.6 HYPOKALEMIA: Status: RESOLVED | Noted: 2022-02-16 | Resolved: 2022-03-10

## 2022-03-10 PROBLEM — R21 RASH: Status: RESOLVED | Noted: 2022-02-09 | Resolved: 2022-03-10

## 2022-03-10 PROBLEM — J96.01 ACUTE RESPIRATORY FAILURE WITH HYPOXIA AND HYPERCARBIA: Status: RESOLVED | Noted: 2022-02-14 | Resolved: 2022-03-10

## 2022-03-10 PROBLEM — J96.02 ACUTE RESPIRATORY FAILURE WITH HYPOXIA AND HYPERCARBIA: Status: RESOLVED | Noted: 2022-02-14 | Resolved: 2022-03-10

## 2022-03-10 PROBLEM — I83.022 VENOUS STASIS ULCER OF LEFT CALF LIMITED TO BREAKDOWN OF SKIN WITH VARICOSE VEINS: Status: RESOLVED | Noted: 2022-02-09 | Resolved: 2022-03-10

## 2022-03-10 PROBLEM — J90 BILATERAL PLEURAL EFFUSION: Status: RESOLVED | Noted: 2022-02-14 | Resolved: 2022-03-10

## 2022-03-10 LAB
ALBUMIN SERPL BCP-MCNC: 3.3 G/DL (ref 3.5–5.2)
ALP SERPL-CCNC: 162 U/L (ref 55–135)
ALT SERPL W/O P-5'-P-CCNC: 34 U/L (ref 10–44)
ANION GAP SERPL CALC-SCNC: 9 MMOL/L (ref 8–16)
AST SERPL-CCNC: 42 U/L (ref 10–40)
BILIRUB SERPL-MCNC: 1 MG/DL (ref 0.1–1)
BUN SERPL-MCNC: 14 MG/DL (ref 8–23)
CALCIUM SERPL-MCNC: 9.2 MG/DL (ref 8.7–10.5)
CHLORIDE SERPL-SCNC: 97 MMOL/L (ref 95–110)
CO2 SERPL-SCNC: 21 MMOL/L (ref 23–29)
CREAT SERPL-MCNC: 0.8 MG/DL (ref 0.5–1.4)
EST. GFR  (AFRICAN AMERICAN): >60 ML/MIN/1.73 M^2
EST. GFR  (NON AFRICAN AMERICAN): >60 ML/MIN/1.73 M^2
GLUCOSE SERPL-MCNC: 93 MG/DL (ref 70–110)
POTASSIUM SERPL-SCNC: 4.9 MMOL/L (ref 3.5–5.1)
PROT SERPL-MCNC: 6.8 G/DL (ref 6–8.4)
SODIUM SERPL-SCNC: 127 MMOL/L (ref 136–145)

## 2022-03-10 PROCEDURE — 1111F DSCHRG MED/CURRENT MED MERGE: CPT | Mod: CPTII,S$GLB,, | Performed by: INTERNAL MEDICINE

## 2022-03-10 PROCEDURE — 1160F RVW MEDS BY RX/DR IN RCRD: CPT | Mod: CPTII,S$GLB,, | Performed by: INTERNAL MEDICINE

## 2022-03-10 PROCEDURE — 1159F MED LIST DOCD IN RCRD: CPT | Mod: CPTII,S$GLB,, | Performed by: INTERNAL MEDICINE

## 2022-03-10 PROCEDURE — 1160F PR REVIEW ALL MEDS BY PRESCRIBER/CLIN PHARMACIST DOCUMENTED: ICD-10-PCS | Mod: CPTII,S$GLB,, | Performed by: INTERNAL MEDICINE

## 2022-03-10 PROCEDURE — 99214 OFFICE O/P EST MOD 30 MIN: CPT | Mod: S$GLB,,, | Performed by: INTERNAL MEDICINE

## 2022-03-10 PROCEDURE — 1126F AMNT PAIN NOTED NONE PRSNT: CPT | Mod: CPTII,S$GLB,, | Performed by: INTERNAL MEDICINE

## 2022-03-10 PROCEDURE — 3075F SYST BP GE 130 - 139MM HG: CPT | Mod: CPTII,S$GLB,, | Performed by: INTERNAL MEDICINE

## 2022-03-10 PROCEDURE — 1100F PR PT FALLS ASSESS DOC 2+ FALLS/FALL W/INJURY/YR: ICD-10-PCS | Mod: CPTII,S$GLB,, | Performed by: INTERNAL MEDICINE

## 2022-03-10 PROCEDURE — 99214 PR OFFICE/OUTPT VISIT, EST, LEVL IV, 30-39 MIN: ICD-10-PCS | Mod: S$GLB,,, | Performed by: INTERNAL MEDICINE

## 2022-03-10 PROCEDURE — 36415 COLL VENOUS BLD VENIPUNCTURE: CPT | Performed by: INTERNAL MEDICINE

## 2022-03-10 PROCEDURE — 1159F PR MEDICATION LIST DOCUMENTED IN MEDICAL RECORD: ICD-10-PCS | Mod: CPTII,S$GLB,, | Performed by: INTERNAL MEDICINE

## 2022-03-10 PROCEDURE — 1111F PR DISCHARGE MEDS RECONCILED W/ CURRENT OUTPATIENT MED LIST: ICD-10-PCS | Mod: CPTII,S$GLB,, | Performed by: INTERNAL MEDICINE

## 2022-03-10 PROCEDURE — 80053 COMPREHEN METABOLIC PANEL: CPT | Performed by: INTERNAL MEDICINE

## 2022-03-10 PROCEDURE — 3288F FALL RISK ASSESSMENT DOCD: CPT | Mod: CPTII,S$GLB,, | Performed by: INTERNAL MEDICINE

## 2022-03-10 PROCEDURE — 3288F PR FALLS RISK ASSESSMENT DOCUMENTED: ICD-10-PCS | Mod: CPTII,S$GLB,, | Performed by: INTERNAL MEDICINE

## 2022-03-10 PROCEDURE — 3075F PR MOST RECENT SYSTOLIC BLOOD PRESS GE 130-139MM HG: ICD-10-PCS | Mod: CPTII,S$GLB,, | Performed by: INTERNAL MEDICINE

## 2022-03-10 PROCEDURE — 1100F PTFALLS ASSESS-DOCD GE2>/YR: CPT | Mod: CPTII,S$GLB,, | Performed by: INTERNAL MEDICINE

## 2022-03-10 PROCEDURE — 99999 PR PBB SHADOW E&M-EST. PATIENT-LVL IV: ICD-10-PCS | Mod: PBBFAC,,, | Performed by: INTERNAL MEDICINE

## 2022-03-10 PROCEDURE — 1126F PR PAIN SEVERITY QUANTIFIED, NO PAIN PRESENT: ICD-10-PCS | Mod: CPTII,S$GLB,, | Performed by: INTERNAL MEDICINE

## 2022-03-10 PROCEDURE — 99999 PR PBB SHADOW E&M-EST. PATIENT-LVL IV: CPT | Mod: PBBFAC,,, | Performed by: INTERNAL MEDICINE

## 2022-03-10 PROCEDURE — 3078F PR MOST RECENT DIASTOLIC BLOOD PRESSURE < 80 MM HG: ICD-10-PCS | Mod: CPTII,S$GLB,, | Performed by: INTERNAL MEDICINE

## 2022-03-10 PROCEDURE — 3078F DIAST BP <80 MM HG: CPT | Mod: CPTII,S$GLB,, | Performed by: INTERNAL MEDICINE

## 2022-03-10 NOTE — PROGRESS NOTES
[facility]   New Visit Progress Note   Recent Hospital Discharge     PRESENTING HISTORY     Chief Complaint/Reason for Admission:  Follow up Hospital Discharge   PCP: Niles Martin MD    History of Present Illness:  Mr. Alexander Kelly is a 81 y.o. male who was recently admitted to the hospital.    [facility]  Discharge Summary        Patient Name: Alexander Kelly  MRN: [id number]  Patient Class: IP- Inpatient  Admission Date: 2/14/2022  Hospital Length of Stay: 3 days  Discharge Date and Time: 2/17/2022  4:13 PM  Attending Physician: No att. providers found   Discharging Provider: Miguel Rojo MD  Primary Care Provider: Niles Martin MD  ___________________________________________________________________    Today:  Presents to [facility] for initial hospital follow up.  Recently hospitalized for decompensated heart failure.  Complicated by acute hypoxic respiratory failure requiring supplemental NC oxygen.  Patient initially required Lasix infusion-> de-escalated to IV push Lasix then oral Bumex.  Diuresed ~ 14L.   Patient responded well to diuresis and supportive care.  Weaned to room air.  Discharged to home with cardiology follow up.    Patient accompanied today by family.  Ambulatory and independent with ADL's.  Reports compliance with all medication.  Did not bring medication bottles for review.  Monitoring daily weights-> consistently at 212 lb on home scale since hospital discharge.  Following sodium restricted diet- wife does meal prep.    Chronic BLE lymphedema.  Has not been seen in Wound Care clinic or Lymphedema clinic.  Patient has previously deferred scheduling.  Counseling and education provided today-> he is agreeable to scheduling.   Uses lymphedema pump ~ 2 hours daily.  Presently with one open wound on Left knee, some blisters, no weeping noted.     Has [facility].  Still has skilled nursing services weekly but was discharged from  home therapy services.   Family reports two recent mechanical falls.     Review of Systems  General ROS: negative for chills, fever or weight loss  Psychological ROS: negative for hallucination, depression or suicidal ideation  Ophthalmic ROS: negative for blurry vision, photophobia or eye pain  ENT ROS: negative for epistaxis, sore throat or rhinorrhea  Respiratory ROS: no cough, shortness of breath, or wheezing  Cardiovascular ROS: no chest pain or dyspnea on exertion  Gastrointestinal ROS: no abdominal pain, change in bowel habits, or black/ bloody stools  Genito-Urinary ROS: no dysuria, trouble voiding, or hematuria  Musculoskeletal ROS: negative for gait disturbance or muscular weakness  + 2 recent mechanical falls   + bilateral LE swelling   Neurological ROS: no syncope or seizures; no ataxia  Dermatological ROS: negative for pruritis, rash and jaundice          PAST HISTORY:     Past Medical History:   Diagnosis Date    Bilateral hearing loss     Hypertension        No past surgical history on file.    Family History   Problem Relation Age of Onset    No Known Problems Mother     No Known Problems Father          MEDICATIONS & ALLERGIES:     Current Outpatient Medications on File Prior to Visit   Medication Sig Dispense Refill    albuterol (VENTOLIN HFA) 90 mcg/actuation inhaler Inhale 2 puffs into the lungs every 6 (six) hours as needed for Wheezing. Rescue 1 Inhaler 1    ammonium lactate 12 % Crea Apply topically 2 (two) times daily. 280 g 0    bisoprolol (ZEBETA) 5 MG tablet Take 5 mg by mouth once daily.      budesonide-formoterol 160-4.5 mcg (SYMBICORT) 160-4.5 mcg/actuation HFAA Inhale 2 puffs into the lungs every 12 (twelve) hours.      bumetanide (BUMEX) 1 MG tablet Take 1 mg by mouth once daily.      mupirocin (BACTROBAN) 2 % ointment Apply to affected area 3 times daily 22 g 1    potassium chloride SA (K-DUR,KLOR-CON) 20 MEQ tablet Take 20 mEq by mouth once daily.      valsartan (DIOVAN)  320 MG tablet Take 320 mg by mouth once daily.       No current facility-administered medications on file prior to visit.        Review of patient's allergies indicates:  No Known Allergies    OBJECTIVE:     Vital Signs:  /66 (BP Location: Right arm, Patient Position: Sitting, BP Method: Small (Automatic))   Pulse 66   Temp 98.1 °F (36.7 °C) (Oral)   Wt 99.5 kg (219 lb 5.7 oz)   SpO2 97%   BMI 30.59 kg/m²   Wt Readings from Last 3 Encounters:   03/10/22 1310 99.5 kg (219 lb 5.7 oz)   02/14/22 1837 108.9 kg (240 lb)   02/14/22 1613 113.9 kg (251 lb)     Body mass index is 30.59 kg/m².        Physical Exam:  /66 (BP Location: Right arm, Patient Position: Sitting, BP Method: Small (Automatic))   Pulse 66   Temp 98.1 °F (36.7 °C) (Oral)   Wt 99.5 kg (219 lb 5.7 oz)   SpO2 97%   BMI 30.59 kg/m²   General appearance: alert, cooperative, no distress  Constitutional:Oriented to person, place, and time  + appears well-developed and well-nourished.   HEENT: Normocephalic, atraumatic, neck symmetrical, no nasal discharge   Eyes: conjunctivae/corneas clear, PERRL, EOM's intact  Lungs: clear to auscultation bilaterally, no dullness to percussion bilaterally  Heart: regular rate and rhythm without rub; no displacement of the PMI   + murmur   Abdomen: soft, non-tender; bowel sounds normoactive; no organomegaly  Extremities: extremities symmetric; + 2 edema bilateral LE, open wound Left knee, skin discolored, scant hair, + blisters  Integument: Skin color, texture, turgor normal; no rashes; hair distrubution normal  Neurologic: Alert and oriented X 3, normal strength, normal coordination and gait  Psychiatric: no pressured speech; normal affect; no evidence of impaired cognition     Laboratory  Lab Results   Component Value Date    WBC 7.61 02/14/2022    HGB 14.6 02/14/2022    HCT 44.7 02/14/2022    MCV 93 02/14/2022     02/14/2022     BMP  Lab Results   Component Value Date     (L) 02/17/2022     K 3.5 02/17/2022    CL 87 (L) 02/17/2022    CO2 33 (H) 02/17/2022    BUN 20 02/17/2022    CREATININE 0.8 02/17/2022    CALCIUM 9.3 02/17/2022    ANIONGAP 12 02/17/2022    ESTGFRAFRICA >60 02/17/2022    EGFRNONAA >60 02/17/2022     Lab Results   Component Value Date    ALT 25 02/17/2022    AST 39 02/17/2022    ALKPHOS 153 (H) 02/17/2022    BILITOT 2.8 (H) 02/17/2022     No results found for: INR, PROTIME  No results found for: HGBA1C    Diagnostic Results:    2 D echo 2/14/22:  · The left ventricle is normal in size with normal systolic function.  · The estimated ejection fraction is 65%.  · Grade II left ventricular diastolic dysfunction.  · The ascending aorta is mildly dilated.  · Mild right ventricular enlargement with normal right ventricular systolic function.  · Mild tricuspid regurgitation.  · The estimated PA systolic pressure is 66 mmHg.  · The IVC is enlarged, measuring >3cm. Elevated central venous pressure (15 mmHg).  · There is pulmonary hypertension.  · Biatrial enlargement.  · Moderate circumferential pericardial effusion with largest diameter posterolaterally at 1.5cm.  · There is a left pleural effusion.    Chest X Ray 2/14/22:  Findings suggesting vascular congestion/edema.  Left greater than right pleural effusions with bibasilar atelectasis.  Superimposed aspiration pneumonia not excluded.    ASSESSMENT & PLAN:       Acute on chronic diastolic congestive heart failure  - continue current medication regimen  - keep Cardiology follow up 3/23/22  - continue daily weights, sodium restriction   -     Comprehensive Metabolic Panel; Future; Expected date: 03/10/2022    Venous stasis  Venous stasis dermatitis of both lower extremities  Lymphedema of both lower extremities  - will see Podiatry 3/14/22  - Wound Care clinic 3/30/22  - continue daily use of lymphedema pump   -     Ambulatory referral/consult to Podiatry; Future; Expected date: 03/17/2022      [facility]  He will see Dr Hernandez 4/28/22 to establish new primary care.     Instructions for the patient:      Scheduled Follow-up :  Future Appointments   Date Time Provider Department Center   3/14/2022  3:15 PM Alvina Davidson DPM [facility] POD [place]   3/23/2022 11:00 AM Artur Healy MD PhD [facility] [place]   3/30/2022  8:00 AM Viola Dunn MD [facility] WOUND [facility]   4/28/2022  8:40 AM Artur Hernandez MD [facility]       Post Visit Medication List:     Medication List          Accurate as of March 10, 2022  2:09 PM. If you have any questions, ask your nurse or doctor.            CONTINUE taking these medications    albuterol 90 mcg/actuation inhaler  Commonly known as: VENTOLIN HFA  Inhale 2 puffs into the lungs every 6 (six) hours as needed for Wheezing. Rescue     ammonium lactate 12 % Crea  Apply topically 2 (two) times daily.     bisoprolol 5 MG tablet  Commonly known as: ZEBETA     budesonide-formoterol 160-4.5 mcg 160-4.5 mcg/actuation Hfaa  Commonly known as: SYMBICORT     bumetanide 1 MG tablet  Commonly known as: BUMEX     mupirocin 2 % ointment  Commonly known as: BACTROBAN  Apply to affected area 3 times daily     potassium chloride SA 20 MEQ tablet  Commonly known as: K-DUR,KLOR-CON     valsartan 320 MG tablet  Commonly known as: DIOVAN            Signing Physician:  Jennifer Alvarado MD

## 2022-03-17 ENCOUNTER — OFFICE VISIT (OUTPATIENT)
Dept: PODIATRY | Facility: CLINIC | Age: 82
End: 2022-03-17
Payer: MEDICARE

## 2022-03-17 VITALS
BODY MASS INDEX: 29.35 KG/M2 | DIASTOLIC BLOOD PRESSURE: 67 MMHG | HEIGHT: 71 IN | SYSTOLIC BLOOD PRESSURE: 141 MMHG | WEIGHT: 209.63 LBS

## 2022-03-17 DIAGNOSIS — I73.9 PAD (PERIPHERAL ARTERY DISEASE): Primary | ICD-10-CM

## 2022-03-17 DIAGNOSIS — I87.2 VENOUS STASIS DERMATITIS OF BOTH LOWER EXTREMITIES: ICD-10-CM

## 2022-03-17 DIAGNOSIS — B35.1 ONYCHOMYCOSIS: ICD-10-CM

## 2022-03-17 PROCEDURE — 1126F AMNT PAIN NOTED NONE PRSNT: CPT | Mod: CPTII,S$GLB,, | Performed by: PODIATRIST

## 2022-03-17 PROCEDURE — 1159F PR MEDICATION LIST DOCUMENTED IN MEDICAL RECORD: ICD-10-PCS | Mod: CPTII,S$GLB,, | Performed by: PODIATRIST

## 2022-03-17 PROCEDURE — 11721 ROUTINE FOOT CARE: ICD-10-PCS | Mod: Q8,S$GLB,, | Performed by: PODIATRIST

## 2022-03-17 PROCEDURE — 1111F DSCHRG MED/CURRENT MED MERGE: CPT | Mod: CPTII,S$GLB,, | Performed by: PODIATRIST

## 2022-03-17 PROCEDURE — 11721 DEBRIDE NAIL 6 OR MORE: CPT | Mod: Q8,S$GLB,, | Performed by: PODIATRIST

## 2022-03-17 PROCEDURE — 3078F DIAST BP <80 MM HG: CPT | Mod: CPTII,S$GLB,, | Performed by: PODIATRIST

## 2022-03-17 PROCEDURE — 99999 PR PBB SHADOW E&M-EST. PATIENT-LVL III: ICD-10-PCS | Mod: PBBFAC,,, | Performed by: PODIATRIST

## 2022-03-17 PROCEDURE — 1160F RVW MEDS BY RX/DR IN RCRD: CPT | Mod: CPTII,S$GLB,, | Performed by: PODIATRIST

## 2022-03-17 PROCEDURE — 1159F MED LIST DOCD IN RCRD: CPT | Mod: CPTII,S$GLB,, | Performed by: PODIATRIST

## 2022-03-17 PROCEDURE — 3077F PR MOST RECENT SYSTOLIC BLOOD PRESSURE >= 140 MM HG: ICD-10-PCS | Mod: CPTII,S$GLB,, | Performed by: PODIATRIST

## 2022-03-17 PROCEDURE — 99203 PR OFFICE/OUTPT VISIT, NEW, LEVL III, 30-44 MIN: ICD-10-PCS | Mod: 25,S$GLB,, | Performed by: PODIATRIST

## 2022-03-17 PROCEDURE — 1111F PR DISCHARGE MEDS RECONCILED W/ CURRENT OUTPATIENT MED LIST: ICD-10-PCS | Mod: CPTII,S$GLB,, | Performed by: PODIATRIST

## 2022-03-17 PROCEDURE — 1126F PR PAIN SEVERITY QUANTIFIED, NO PAIN PRESENT: ICD-10-PCS | Mod: CPTII,S$GLB,, | Performed by: PODIATRIST

## 2022-03-17 PROCEDURE — 3077F SYST BP >= 140 MM HG: CPT | Mod: CPTII,S$GLB,, | Performed by: PODIATRIST

## 2022-03-17 PROCEDURE — 99203 OFFICE O/P NEW LOW 30 MIN: CPT | Mod: 25,S$GLB,, | Performed by: PODIATRIST

## 2022-03-17 PROCEDURE — 99999 PR PBB SHADOW E&M-EST. PATIENT-LVL III: CPT | Mod: PBBFAC,,, | Performed by: PODIATRIST

## 2022-03-17 PROCEDURE — 1160F PR REVIEW ALL MEDS BY PRESCRIBER/CLIN PHARMACIST DOCUMENTED: ICD-10-PCS | Mod: CPTII,S$GLB,, | Performed by: PODIATRIST

## 2022-03-17 PROCEDURE — 3078F PR MOST RECENT DIASTOLIC BLOOD PRESSURE < 80 MM HG: ICD-10-PCS | Mod: CPTII,S$GLB,, | Performed by: PODIATRIST

## 2022-03-17 NOTE — PROGRESS NOTES
"Subjective:      Patient ID: Alexander Kelly is a 81 y.o. male.    Chief Complaint: Foot Problem    Alexander is a 81 y.o. male who presents to the clinic for evaluation and treatment of high risk feet. Alexander has a past medical history of Bilateral hearing loss and Hypertension. The patient's chief complaint is long, thick toenails. This patient has documented high risk feet requiring routine maintenance secondary to peripheral vascular disease. He also admits to scaling of the bottom of both feet, improved using ammonium lactate since recent discharge from hospital 02/21 for fluid overload from CHF.     PCP: Niles Martin MD  Recently seen by Arabella Alvarado MD on 03/10/2022 whom is managing post discharge from hospital.    Current shoe gear:  Affected Foot: Tennis shoes     Unaffected Foot: Tennis shoes    No results found for: HGBA1C    Vitals:    03/17/22 1515   BP: (!) 141/67   Weight: 95.1 kg (209 lb 9.6 oz)   Height: 5' 11" (1.803 m)   PainSc: 0-No pain      Past Medical History:   Diagnosis Date    Bilateral hearing loss     Hypertension        History reviewed. No pertinent surgical history.    Family History   Problem Relation Age of Onset    No Known Problems Mother     No Known Problems Father        Social History     Socioeconomic History    Marital status:    Tobacco Use    Smoking status: Never Smoker    Smokeless tobacco: Never Used     Social Determinants of Health     Financial Resource Strain: Low Risk     Difficulty of Paying Living Expenses: Not hard at all   Food Insecurity: No Food Insecurity    Worried About Running Out of Food in the Last Year: Never true    Ran Out of Food in the Last Year: Never true   Transportation Needs: No Transportation Needs    Lack of Transportation (Medical): No    Lack of Transportation (Non-Medical): No   Social Connections: Unknown    Marital Status:    Housing Stability: Low Risk     Unable to Pay for Housing in the Last Year: No    " Number of Places Lived in the Last Year: 1    Unstable Housing in the Last Year: No       Current Outpatient Medications   Medication Sig Dispense Refill    bumetanide (BUMEX) 1 MG tablet Take 1 mg by mouth once daily.      potassium chloride SA (K-DUR,KLOR-CON) 20 MEQ tablet Take 20 mEq by mouth once daily.      valsartan (DIOVAN) 320 MG tablet Take 320 mg by mouth once daily.      albuterol (VENTOLIN HFA) 90 mcg/actuation inhaler Inhale 2 puffs into the lungs every 6 (six) hours as needed for Wheezing. Rescue (Patient not taking: Reported on 3/17/2022) 1 Inhaler 1    ammonium lactate 12 % Crea Apply topically 2 (two) times daily. (Patient not taking: Reported on 3/17/2022) 280 g 0    bisoprolol (ZEBETA) 5 MG tablet Take 5 mg by mouth once daily.      budesonide-formoterol 160-4.5 mcg (SYMBICORT) 160-4.5 mcg/actuation HFAA Inhale 2 puffs into the lungs every 12 (twelve) hours.      mupirocin (BACTROBAN) 2 % ointment Apply to affected area 3 times daily (Patient not taking: Reported on 3/17/2022) 22 g 1     No current facility-administered medications for this visit.       Review of patient's allergies indicates:  No Known Allergies    Review of Systems   Constitutional: Negative for chills and fever.   HENT: Negative for ear pain.    Eyes: Negative for pain.   Cardiovascular: Positive for leg swelling. Negative for chest pain.   Respiratory: Negative for cough and shortness of breath.    Skin: Positive for color change, dry skin and nail changes. Negative for itching.   Musculoskeletal: Positive for stiffness.   Gastrointestinal: Negative for nausea and vomiting.   Genitourinary: Negative for dysuria.   Neurological: Negative for focal weakness and numbness.   Psychiatric/Behavioral: Negative for altered mental status.   All other systems reviewed and are negative.          Objective:      Physical Exam  Vitals and nursing note reviewed.   Constitutional:       General: He is not in acute distress.      Appearance: Normal appearance.   HENT:      Head: Normocephalic and atraumatic.   Eyes:      Extraocular Movements: Extraocular movements intact.      Pupils: Pupils are equal, round, and reactive to light.   Cardiovascular:      Pulses:           Dorsalis pedis pulses are 2+ on the right side and 2+ on the left side.        Posterior tibial pulses are detected w/ Doppler on the right side and detected w/ Doppler on the left side.      Comments: Monophasic right PT and biphasic left PT with doppler.  Moderate brawny edema to lower extremity bilateral with erythema of both legs.  No hair growth to lower extremity bilateral.  Abdominal:      General: There is no distension.   Musculoskeletal:      Comments: No pain with ROM or MMT bilateral lower extremity.     Feet:      Right foot:      Protective Sensation: 3 sites tested. 3 sites sensed.      Skin integrity: Dry skin present. No ulcer or callus.      Toenail Condition: Fungal disease present.     Left foot:      Protective Sensation: 3 sites tested. 3 sites sensed.      Skin integrity: Dry skin present. No ulcer or callus.      Toenail Condition: Fungal disease present.     Comments: Right PT/DP arteries monophasic on doppler, left PT/DP arteries biphasic on doppler.   Skin:     General: Skin is warm and dry.      Findings: No ecchymosis.      Nails: There is no clubbing.      Comments: Nail plates thickened, elongated, dystrophic, discolored x10. Scaling of acral surfaces of feet bilaterally. Chronic venous stasis and lymphedema changes bilateral lower legs.    Neurological:      Mental Status: He is alert and oriented to person, place, and time.   Psychiatric:         Mood and Affect: Mood normal.         Behavior: Behavior normal.         Thought Content: Thought content normal.         Judgment: Judgment normal.               Assessment:       Encounter Diagnoses   Name Primary?    Venous stasis dermatitis of both lower extremities     PAD (peripheral artery  disease) Yes    Onychomycosis          Plan:       Alexander was seen today for foot problem.    Diagnoses and all orders for this visit:    PAD (peripheral artery disease)  -     Routine Foot Care    Venous stasis dermatitis of both lower extremities  -     Ambulatory referral/consult to Podiatry    Onychomycosis  -     Routine Foot Care      I counseled the patient on his conditions, their implications and medical management.    Shoe inspection. Patient instructed on proper foot hygeine. We discussed wearing proper shoe gear, daily foot inspections, never walking without protective shoe gear, never putting sharp instruments to feet, routine podiatric nail visits every 3-4 months.      Routine foot care per attached note. Patient relates relief following the procedure. He will continue to monitor the areas daily, inspect his feet, wear protective shoe gear when ambulatory, moisturizer to maintain skin integrity and follow in this office in approximately 3-4 months.     - Continue topical ammonium lactate to bilateral feet and legs BID.     RTC 3-4 months, sooner PRN.    Assisted by Miguel Hughes DPM PGY-2    Considered high risk for a lower extremity complications secondary to peripheral arterial disease and chronic venous insufficiency.    I have personally taken the history and examined this patient and agree with the resident's note as stated as above.   Constantin Lucero DPM, FACFAS

## 2022-03-17 NOTE — PROCEDURES
"Routine Foot Care    Date/Time: 3/17/2022 3:00 PM  Performed by: Constantin Lucero DPM  Authorized by: Constantin Lucero DPM     Time out: Immediately prior to procedure a "time out" was called to verify the correct patient, procedure, equipment, support staff and site/side marked as required.    Consent Done?:  Yes (Verbal)  Hyperkeratotic Skin Lesions?: No      Nail Care Type:  Debride  Location(s): All  (Left 1st Toe, Left 3rd Toe, Left 2nd Toe, Left 4th Toe, Left 5th Toe, Right 1st Toe, Right 2nd Toe, Right 3rd Toe, Right 4th Toe and Right 5th Toe)  Patient tolerance:  Patient tolerated the procedure well with no immediate complications     Used sterile nail nipper. Assisted by Miguel Hughes DPM PGY 2        "

## 2022-03-23 ENCOUNTER — TELEPHONE (OUTPATIENT)
Dept: CARDIOLOGY | Facility: CLINIC | Age: 82
End: 2022-03-23
Payer: MEDICARE

## 2022-03-23 ENCOUNTER — OFFICE VISIT (OUTPATIENT)
Dept: CARDIOLOGY | Facility: CLINIC | Age: 82
End: 2022-03-23
Payer: MEDICARE

## 2022-03-23 VITALS
WEIGHT: 212.31 LBS | DIASTOLIC BLOOD PRESSURE: 78 MMHG | HEART RATE: 74 BPM | OXYGEN SATURATION: 98 % | HEIGHT: 71 IN | SYSTOLIC BLOOD PRESSURE: 124 MMHG | BODY MASS INDEX: 29.72 KG/M2

## 2022-03-23 DIAGNOSIS — E66.9 OBESITY (BMI 30-39.9): ICD-10-CM

## 2022-03-23 DIAGNOSIS — I50.33 ACUTE ON CHRONIC DIASTOLIC CONGESTIVE HEART FAILURE: ICD-10-CM

## 2022-03-23 DIAGNOSIS — I89.0 LYMPHEDEMA OF BOTH LOWER EXTREMITIES: ICD-10-CM

## 2022-03-23 DIAGNOSIS — I27.20 PULMONARY HYPERTENSION: Primary | ICD-10-CM

## 2022-03-23 DIAGNOSIS — J90 BILATERAL PLEURAL EFFUSION: ICD-10-CM

## 2022-03-23 DIAGNOSIS — J96.02 ACUTE RESPIRATORY FAILURE WITH HYPOXIA AND HYPERCARBIA: ICD-10-CM

## 2022-03-23 DIAGNOSIS — I87.2 VENOUS STASIS DERMATITIS OF BOTH LOWER EXTREMITIES: ICD-10-CM

## 2022-03-23 DIAGNOSIS — R06.02 SOB (SHORTNESS OF BREATH): ICD-10-CM

## 2022-03-23 DIAGNOSIS — J96.01 ACUTE RESPIRATORY FAILURE WITH HYPOXIA AND HYPERCARBIA: ICD-10-CM

## 2022-03-23 PROCEDURE — 3288F FALL RISK ASSESSMENT DOCD: CPT | Mod: CPTII,S$GLB,, | Performed by: INTERNAL MEDICINE

## 2022-03-23 PROCEDURE — 99215 PR OFFICE/OUTPT VISIT, EST, LEVL V, 40-54 MIN: ICD-10-PCS | Mod: S$GLB,,, | Performed by: INTERNAL MEDICINE

## 2022-03-23 PROCEDURE — 99215 OFFICE O/P EST HI 40 MIN: CPT | Mod: S$GLB,,, | Performed by: INTERNAL MEDICINE

## 2022-03-23 PROCEDURE — 1126F PR PAIN SEVERITY QUANTIFIED, NO PAIN PRESENT: ICD-10-PCS | Mod: CPTII,S$GLB,, | Performed by: INTERNAL MEDICINE

## 2022-03-23 PROCEDURE — 3078F PR MOST RECENT DIASTOLIC BLOOD PRESSURE < 80 MM HG: ICD-10-PCS | Mod: CPTII,S$GLB,, | Performed by: INTERNAL MEDICINE

## 2022-03-23 PROCEDURE — 1101F PR PT FALLS ASSESS DOC 0-1 FALLS W/OUT INJ PAST YR: ICD-10-PCS | Mod: CPTII,S$GLB,, | Performed by: INTERNAL MEDICINE

## 2022-03-23 PROCEDURE — 1101F PT FALLS ASSESS-DOCD LE1/YR: CPT | Mod: CPTII,S$GLB,, | Performed by: INTERNAL MEDICINE

## 2022-03-23 PROCEDURE — 99999 PR PBB SHADOW E&M-EST. PATIENT-LVL III: CPT | Mod: PBBFAC,,, | Performed by: INTERNAL MEDICINE

## 2022-03-23 PROCEDURE — 1159F PR MEDICATION LIST DOCUMENTED IN MEDICAL RECORD: ICD-10-PCS | Mod: CPTII,S$GLB,, | Performed by: INTERNAL MEDICINE

## 2022-03-23 PROCEDURE — 3074F SYST BP LT 130 MM HG: CPT | Mod: CPTII,S$GLB,, | Performed by: INTERNAL MEDICINE

## 2022-03-23 PROCEDURE — 3078F DIAST BP <80 MM HG: CPT | Mod: CPTII,S$GLB,, | Performed by: INTERNAL MEDICINE

## 2022-03-23 PROCEDURE — 1126F AMNT PAIN NOTED NONE PRSNT: CPT | Mod: CPTII,S$GLB,, | Performed by: INTERNAL MEDICINE

## 2022-03-23 PROCEDURE — 99999 PR PBB SHADOW E&M-EST. PATIENT-LVL III: ICD-10-PCS | Mod: PBBFAC,,, | Performed by: INTERNAL MEDICINE

## 2022-03-23 PROCEDURE — 3074F PR MOST RECENT SYSTOLIC BLOOD PRESSURE < 130 MM HG: ICD-10-PCS | Mod: CPTII,S$GLB,, | Performed by: INTERNAL MEDICINE

## 2022-03-23 PROCEDURE — 1159F MED LIST DOCD IN RCRD: CPT | Mod: CPTII,S$GLB,, | Performed by: INTERNAL MEDICINE

## 2022-03-23 PROCEDURE — 3288F PR FALLS RISK ASSESSMENT DOCUMENTED: ICD-10-PCS | Mod: CPTII,S$GLB,, | Performed by: INTERNAL MEDICINE

## 2022-03-23 NOTE — PATIENT INSTRUCTIONS
Assessment/Plan:  Alexander Kelly is a 81 y.o. male with HFpEF, HTN, COPD, bilateral hearing loss, alcoholism, obesity, who presents for a follow up appointment.    1. BLE Lymphedema with Venous Stasis Wounds- Wounds have now completely healed.  BLE Venous Ultrasound on 2/15/2022 revealed no findings of right or left lower extremity deep venous thrombophlebitis.  Continue bumex 1 mg daily.    Pt to limit sodium intake to 2,000 mg daily.  Limit volume intake to 1.5 liters daily.      2. SOB- Due to acute on chronic HFPEF, which has now resolved.  Echo as above.  Continue current medications.      3. HTN- Continue current medications.  Pt to keep log of blood pressure/heart rate and bring in next visit for review.     4. Obesity- Encourage diet, exercise and weight loss.      Follow up in 3 months

## 2022-03-23 NOTE — PROGRESS NOTES
[facility]      Chief Complaint   Patient presents with    Venous stasis dermatitis of both lower extremities       Patient ID: Alexander Kelly is a 81 y.o. male with HFpEF, HTN, COPD, bilateral hearing loss, alcoholism, obesity, who presents for an initial appointment.  Pertinent history/events are as follows:     -Pt kindly referred by Dr. Martin for evaluation of lymphedema.    -At our initial clinic visit on 2/9/2022, Mr. Kelly states he was diagnosed with BLE lymphedema 4 years ago.  He uses a lymphedema pump daily.  Reports gaining 30 pounds in 3 weeks in 12/2021.  States he lost the weight after being started on bumex and metolazone.  Reports SOB starting 1 month ago, which is new for him.  He currently takes bumex 1 mg daily, and metolazone 2.5 mg daily at needed for weight gain.  Formerly smoked 1 pack a day for 16 years.  Quit at age 32.  Plan:   BLE Lymphedema with Venous Stasis Wounds- Check BLE venous reflux study and TUNDE study.  Refer to lymphedema clinic.  Continue bumex 1 mg daily.  Continue metolazone 2.5 mg daily at needed for weight gain of at least 2 pounds in a day or 5 pounds in a week.  Refer to wound care.  Given rash, refer to Dermatology for consideration for biopsy.   Pt to limit sodium intake to 2,000 mg daily.  Limit volume intake to 1.5 liters daily.    SOB- Check echo, CXR, and BNP.    HTN- Continue current medications.  Pt to keep log of blood pressure/heart rate and bring in next visit for review.   Obesity- Encourage diet, exercise and weight loss.    -2/14/2022: I talked with Mr. Kelly in detail.  He has severe SOB.  Echo done today shows:   Echo 2/14/2022:  · The left ventricle is normal in size with normal systolic function.  · The estimated ejection fraction is 65%.  · Grade II left ventricular diastolic dysfunction.  · The ascending aorta is mildly dilated.  · Mild right ventricular enlargement with normal right ventricular systolic function.  · Mild tricuspid  regurgitation.  · The estimated PA systolic pressure is 66 mmHg.  · The IVC is enlarged, measuring >3cm. Elevated central venous pressure (15 mmHg).  · There is pulmonary hypertension.  · Biatrial enlargement.  · Moderate circumferential pericardial effusion with largest diameter posterolaterally at 1.5cm.  · There is a left pleural effusion    Outside labs from 2/10/2022 shows serum sodium of 126 with potassium of 5.2.  Given these findings and his severe SOB, pt will require inpatient admission and further management of severe volume overload.  I instructed Mr. Kelly to report immediately to the emergency department for admission.  His wife states they live in [place] and will go to [facility] for admission.      -On 2/14/2022, pt was admitted to [facility] with acute hypoxic respiratory failure due to volume overload with acute on chronic diastolic heart failure exacerbation. He was initiated on CHF pathway with IV lasix. Good diuresis with improvement in hypoxia and dyspnea. Initially required lasix gtt which was then de-escalated to IV pushes then PO bumex.  Weight at discharge was 240 pounds.     HPI:  Mr. Kelly is accompanied by his wife.  States he feels much better since hospital discharge.  He has no chest pain or SOB.  States he's taking all medications as prescribed with no issues.       Past Medical History:   Diagnosis Date    Bilateral hearing loss     Hypertension      History reviewed. No pertinent surgical history.  Social History     Socioeconomic History    Marital status:    Tobacco Use    Smoking status: Never Smoker    Smokeless tobacco: Never Used     Social Determinants of Health     Financial Resource Strain: Low Risk     Difficulty of Paying Living Expenses: Not hard at all   Food Insecurity: No Food Insecurity    Worried About Running Out of Food in the Last Year: Never true    Ran Out of Food in the Last Year: Never true   Transportation Needs: No Transportation  "Needs    Lack of Transportation (Medical): No    Lack of Transportation (Non-Medical): No   Social Connections: Unknown    Marital Status:    Housing Stability: Low Risk     Unable to Pay for Housing in the Last Year: No    Number of Places Lived in the Last Year: 1    Unstable Housing in the Last Year: No     Family History   Problem Relation Age of Onset    No Known Problems Mother     No Known Problems Father        Review of patient's allergies indicates:  No Known Allergies    Medication List with Changes/Refills   Current Medications    ALBUTEROL (VENTOLIN HFA) 90 MCG/ACTUATION INHALER    Inhale 2 puffs into the lungs every 6 (six) hours as needed for Wheezing. Rescue    AMMONIUM LACTATE 12 % CREA    Apply topically 2 (two) times daily.    BUDESONIDE-FORMOTEROL 160-4.5 MCG (SYMBICORT) 160-4.5 MCG/ACTUATION HFAA    Inhale 2 puffs into the lungs every 12 (twelve) hours.    BUMETANIDE (BUMEX) 1 MG TABLET    Take 1 mg by mouth once daily.    POTASSIUM CHLORIDE SA (K-DUR,KLOR-CON) 20 MEQ TABLET    Take 20 mEq by mouth once daily.    VALSARTAN (DIOVAN) 320 MG TABLET    Take 320 mg by mouth once daily.   Discontinued Medications    BISOPROLOL (ZEBETA) 5 MG TABLET    Take 5 mg by mouth once daily.    MUPIROCIN (BACTROBAN) 2 % OINTMENT    Apply to affected area 3 times daily       Review of Systems  Constitution: Denies chills, fever, and sweats.  HENT: Denies headaches or blurry vision.  Cardiovascular: Denies chest pain or irregular heart beat.  Respiratory: Denies cough or shortness of breath.  Gastrointestinal: Denies abdominal pain, nausea, or vomiting.  Musculoskeletal: Positive for leg swelling.  Neurological: Denies dizziness or focal weakness.  Psychiatric/Behavioral: Normal mental status.  Hematologic/Lymphatic: Denies bleeding problem or easy bruising/bleeding.  Skin: Denies rash or suspicious lesions    Physical Examination  /78   Pulse 74   Ht 5' 11" (1.803 m)   Wt 96.3 kg (212 lb " 4.9 oz)   SpO2 98%   BMI 29.61 kg/m²     Constitutional: No acute distress, conversant  HEENT: Sclera anicteric, Pupils equal, round and reactive to light, extraocular motions intact, Oropharynx clear  Neck: No JVD, no carotid bruits  Cardiovascular: regular rate and rhythm, no murmur, rubs or gallops, normal S1/S2  Pulmonary: Clear to auscultation bilaterally  Abdominal: Abdomen soft, nontender, nondistended, positive bowel sounds  Extremities: BLE's with trace pitting edema and changes consistent with lymphedema  Left shin with a moderate sized healing wound.   Both legs with diffuse, erythematous, lacy rash    Pulses:  Carotid pulses are 2+ on the right side, and 2+ on the left side.  Radial pulses are 2+ on the right side, and 2+ on the left side.   Femoral pulses are 2+ on the right side, and 2+ on the left side.  Popliteal pulses are 2+ on the right side, and 2+ on the left side.   Dorsalis pedis pulses are 2+ on the right side, and 2+ on the left side.   Posterior tibial pulses are 2+ on the right side, and 2+ on the left side.    Skin: No ecchymosis, erythema, or ulcers  Psych: Alert and oriented x 3, appropriate affect  Neuro: CNII-XII intact, no focal deficits    Labs:  Most Recent Data  CBC:   Lab Results   Component Value Date    WBC 7.61 02/14/2022    HGB 14.6 02/14/2022    HCT 44.7 02/14/2022     02/14/2022    MCV 93 02/14/2022    RDW 15.4 (H) 02/14/2022     BMP:   Lab Results   Component Value Date     (L) 03/10/2022    K 4.9 03/10/2022    CL 97 03/10/2022    CO2 21 (L) 03/10/2022    BUN 14 03/10/2022    CREATININE 0.8 03/10/2022    GLU 93 03/10/2022    CALCIUM 9.2 03/10/2022    MG 2.0 02/15/2022     LFTS;   Lab Results   Component Value Date    PROT 6.8 03/10/2022    ALBUMIN 3.3 (L) 03/10/2022    BILITOT 1.0 03/10/2022    AST 42 (H) 03/10/2022    ALKPHOS 162 (H) 03/10/2022    ALT 34 03/10/2022     COAGS: No results found for: INR, PROTIME, PTT  FLP: No results found for: CHOL, HDL,  LDLCALC, TRIG, CHOLHDL  CARDIAC:   Lab Results   Component Value Date    TROPONINI 0.016 02/15/2022     (H) 02/14/2022       Echo 2/14/2022:  · The left ventricle is normal in size with normal systolic function.  · The estimated ejection fraction is 65%.  · Grade II left ventricular diastolic dysfunction.  · The ascending aorta is mildly dilated.  · Mild right ventricular enlargement with normal right ventricular systolic function.  · Mild tricuspid regurgitation.  · The estimated PA systolic pressure is 66 mmHg.  · The IVC is enlarged, measuring >3cm. Elevated central venous pressure (15 mmHg).  · There is pulmonary hypertension.  · Biatrial enlargement.  · Moderate circumferential pericardial effusion with largest diameter posterolaterally at 1.5cm.  · There is a left pleural effusion.    BLE Venous Ultrasound 2/15/2022:  1. No findings of right or left lower extremity deep venous thrombophlebitis  2. Bilateral lower extremity soft tissue edema.  3. Prominent enlarged left groin lymph node.    Assessment/Plan:  Alexander Kelly is a 81 y.o. male with HFpEF, HTN, COPD, bilateral hearing loss, alcoholism, obesity, who presents for a follow up appointment.    1. BLE Lymphedema with Venous Stasis Wounds- Wounds have now completely healed.  BLE Venous Ultrasound on 2/15/2022 revealed no findings of right or left lower extremity deep venous thrombophlebitis.  Continue bumex 1 mg daily.    Pt to limit sodium intake to 2,000 mg daily.  Limit volume intake to 1.5 liters daily.      2. SOB- Due to acute on chronic HFPEF, which has now resolved.  Echo as above.  Continue current medications.      3. HTN- Continue current medications.  Pt to keep log of blood pressure/heart rate and bring in next visit for review.     4. Obesity- Encourage diet, exercise and weight loss.      Follow up in 3 months    Total duration of face to face visit time 45 minutes.  Total time spent counseling greater than fifty percent of total visit  time.  Counseling included discussion regarding imaging findings, diagnosis, possibilities, treatment options, risks and benefits.  The patient had many questions regarding the options and long-term effects.    Artur Healy MD, PhD  Interventional Cardiology

## 2022-03-23 NOTE — TELEPHONE ENCOUNTER
Called to notify patient wife       ----- Message from Artur Healy MD PhD sent at 3/23/2022  4:48 PM CDT -----  Regarding: RE: diagnosis   diastolic dysfunction  ----- Message -----  From: Maria Antonia Snider MA  Sent: 3/23/2022   4:39 PM CDT  To: Artur Healy MD PhD  Subject: FW: diagnosis                                    Hey Dr Healy,      Do you know what the patient wife is talking about?  ----- Message -----  From: Greer Gaytan  Sent: 3/23/2022   4:16 PM CDT  To: [facility] Staff  Subject: diagnosis                                        Pt was seen today and his wife is calling to get the name of what is going on with his heart.  She says his heart doesn't relax fast enough and there is a name for it.  She thinks it may start with the letter D.    She can be reached at [phone].    Thank you

## 2022-04-04 ENCOUNTER — TELEPHONE (OUTPATIENT)
Dept: FAMILY MEDICINE | Facility: CLINIC | Age: 82
End: 2022-04-04
Payer: MEDICARE

## 2022-04-04 NOTE — TELEPHONE ENCOUNTER
----- Message from Amber Sanchez sent at 4/4/2022  1:15 PM CDT -----  Type:  Needs Medical Advice    Who Called: pt  Symptoms (please be specific): pt has a appt for 04/28/22@8:40am and they would like to change the appt time to after 1pm if possible     Would the patient rather a call back or a response via [facility]? [facility]  Best Call Back Number: [phone]  Additional Information:

## 2022-04-11 ENCOUNTER — OFFICE VISIT (OUTPATIENT)
Dept: FAMILY MEDICINE | Facility: CLINIC | Age: 82
End: 2022-04-11
Attending: FAMILY MEDICINE
Payer: MEDICARE

## 2022-04-11 VITALS
BODY MASS INDEX: 28.7 KG/M2 | HEART RATE: 89 BPM | HEIGHT: 71 IN | WEIGHT: 205 LBS | DIASTOLIC BLOOD PRESSURE: 68 MMHG | OXYGEN SATURATION: 99 % | SYSTOLIC BLOOD PRESSURE: 124 MMHG

## 2022-04-11 DIAGNOSIS — Z76.89 ENCOUNTER TO ESTABLISH CARE WITH NEW DOCTOR: Primary | ICD-10-CM

## 2022-04-11 DIAGNOSIS — J44.9 CHRONIC OBSTRUCTIVE PULMONARY DISEASE, UNSPECIFIED COPD TYPE: ICD-10-CM

## 2022-04-11 DIAGNOSIS — I89.0 LYMPHEDEMA OF BOTH LOWER EXTREMITIES: ICD-10-CM

## 2022-04-11 DIAGNOSIS — I10 HYPERTENSION, UNSPECIFIED TYPE: ICD-10-CM

## 2022-04-11 DIAGNOSIS — I87.2 VENOUS STASIS DERMATITIS OF BOTH LOWER EXTREMITIES: ICD-10-CM

## 2022-04-11 DIAGNOSIS — I50.32 CHRONIC DIASTOLIC CONGESTIVE HEART FAILURE: ICD-10-CM

## 2022-04-11 PROCEDURE — 1126F PR PAIN SEVERITY QUANTIFIED, NO PAIN PRESENT: ICD-10-PCS | Mod: CPTII,S$GLB,, | Performed by: FAMILY MEDICINE

## 2022-04-11 PROCEDURE — 3288F FALL RISK ASSESSMENT DOCD: CPT | Mod: CPTII,S$GLB,, | Performed by: FAMILY MEDICINE

## 2022-04-11 PROCEDURE — 1159F MED LIST DOCD IN RCRD: CPT | Mod: CPTII,S$GLB,, | Performed by: FAMILY MEDICINE

## 2022-04-11 PROCEDURE — 99204 PR OFFICE/OUTPT VISIT, NEW, LEVL IV, 45-59 MIN: ICD-10-PCS | Mod: S$GLB,,, | Performed by: FAMILY MEDICINE

## 2022-04-11 PROCEDURE — 99999 PR PBB SHADOW E&M-EST. PATIENT-LVL III: ICD-10-PCS | Mod: PBBFAC,,, | Performed by: FAMILY MEDICINE

## 2022-04-11 PROCEDURE — 3078F DIAST BP <80 MM HG: CPT | Mod: CPTII,S$GLB,, | Performed by: FAMILY MEDICINE

## 2022-04-11 PROCEDURE — 1159F PR MEDICATION LIST DOCUMENTED IN MEDICAL RECORD: ICD-10-PCS | Mod: CPTII,S$GLB,, | Performed by: FAMILY MEDICINE

## 2022-04-11 PROCEDURE — 99999 PR PBB SHADOW E&M-EST. PATIENT-LVL III: CPT | Mod: PBBFAC,,, | Performed by: FAMILY MEDICINE

## 2022-04-11 PROCEDURE — 3288F PR FALLS RISK ASSESSMENT DOCUMENTED: ICD-10-PCS | Mod: CPTII,S$GLB,, | Performed by: FAMILY MEDICINE

## 2022-04-11 PROCEDURE — 1126F AMNT PAIN NOTED NONE PRSNT: CPT | Mod: CPTII,S$GLB,, | Performed by: FAMILY MEDICINE

## 2022-04-11 PROCEDURE — 3074F PR MOST RECENT SYSTOLIC BLOOD PRESSURE < 130 MM HG: ICD-10-PCS | Mod: CPTII,S$GLB,, | Performed by: FAMILY MEDICINE

## 2022-04-11 PROCEDURE — 3078F PR MOST RECENT DIASTOLIC BLOOD PRESSURE < 80 MM HG: ICD-10-PCS | Mod: CPTII,S$GLB,, | Performed by: FAMILY MEDICINE

## 2022-04-11 PROCEDURE — 3074F SYST BP LT 130 MM HG: CPT | Mod: CPTII,S$GLB,, | Performed by: FAMILY MEDICINE

## 2022-04-11 PROCEDURE — 1101F PT FALLS ASSESS-DOCD LE1/YR: CPT | Mod: CPTII,S$GLB,, | Performed by: FAMILY MEDICINE

## 2022-04-11 PROCEDURE — 99204 OFFICE O/P NEW MOD 45 MIN: CPT | Mod: S$GLB,,, | Performed by: FAMILY MEDICINE

## 2022-04-11 PROCEDURE — 1101F PR PT FALLS ASSESS DOC 0-1 FALLS W/OUT INJ PAST YR: ICD-10-PCS | Mod: CPTII,S$GLB,, | Performed by: FAMILY MEDICINE

## 2022-04-11 RX ORDER — AMMONIUM LACTATE 12 G/100G
1 CREAM TOPICAL 2 TIMES DAILY
Qty: 280 G | Refills: 0 | Status: SHIPPED | OUTPATIENT
Start: 2022-04-11 | End: 2023-01-01

## 2022-04-11 NOTE — PROGRESS NOTES
(Portions of this note were dictated using voice recognition software and may contain dictation related errors in spelling/grammar/syntax not found on text review)    CC:   Chief Complaint   Patient presents with    Establish Care       HPI: 82 y.o. male presented to [facility] as a new patient.  Patient has medical history significant for hypertension, congestive heart failure, bilateral hearing loss, COPD, PAD.  Patient was recently admitted in the hospital in February due to decompensated heart failure and acute hypoxic respiratory failure, he was diuresed with IV Lasix and was started on Bumex, he follows up with Cardiology (Dr Healy). He reports taking Bumex 1 mg along with potassium every day, he also takes valsartan 320 mg for hypertension, reports compliance, no issues reported.  He takes Symbicort and albuterol as needed for COPD, does not need any refills.  He has chronic lymphedema of both legs and chronic venous stasis changes, he uses lymphedema pump 2 hours every day,he also follows up with podiatry, has wounds on legs which has healed completely. He denies having any shortness of breath, chest pain, nausea, vomiting abdominal pain, changes in bowel habits, urine problems.    Past Medical History:   Diagnosis Date    Bilateral hearing loss     Hypertension        No past surgical history on file.    Family History   Problem Relation Age of Onset    No Known Problems Mother     No Known Problems Father        Social History     Tobacco Use    Smoking status: Never Smoker    Smokeless tobacco: Never Used       Lab Results   Component Value Date    WBC 7.61 02/14/2022    HGB 14.6 02/14/2022    HCT 44.7 02/14/2022    MCV 93 02/14/2022     02/14/2022    ALT 34 03/10/2022    AST 42 (H) 03/10/2022    BILITOT 1.0 03/10/2022    ALKPHOS 162 (H) 03/10/2022     (L) 03/10/2022    K 4.9 03/10/2022    CL 97 03/10/2022    CREATININE 0.8 03/10/2022    ESTGFRAFRICA >60 03/10/2022    EGFRNONAA  >60 03/10/2022    CALCIUM 9.2 03/10/2022    ALBUMIN 3.3 (L) 03/10/2022    BUN 14 03/10/2022    CO2 21 (L) 03/10/2022    TSH 0.449 02/15/2022    GLU 93 03/10/2022             Vital signs reviewed  PE:   APPEARANCE: Well nourished, well developed, in no acute distress.    HEAD: Normocephalic, atraumatic.  EYES: EOMI.  Conjunctivae noninjected.  NOSE: Mucosa pink. Airway clear.  MOUTH & THROAT: No tonsillar enlargement. No pharyngeal erythema or exudate.   NECK: Supple with no cervical lymphadenopathy.    CHEST: Good inspiratory effort. Lungs clear to auscultation with no wheezes or crackles.  CARDIOVASCULAR: Normal S1, S2. No rubs or gallops.  ABDOMEN: Bowel sounds normal. Not distended. Soft. No tenderness or masses. No organomegaly.  EXTREMITIES: edema noted, skin discoloration due to chronic venous stasis, no hairs, shiny    Review of Systems   Constitutional: Negative for chills, fatigue and fever.   HENT: Negative.    Respiratory: Negative for cough, shortness of breath and wheezing.    Cardiovascular: Positive for leg swelling. Negative for chest pain and palpitations.   Gastrointestinal: Negative for abdominal pain, change in bowel habit, constipation, diarrhea, nausea, vomiting, reflux and change in bowel habit.   Genitourinary: Negative.    Musculoskeletal: Negative.    Neurological: Negative.    Psychiatric/Behavioral: Negative.    All other systems reviewed and are negative.      IMPRESSION  1. Encounter to establish care with new doctor    2. Lymphedema of both lower extremities    3. Venous stasis dermatitis of both lower extremities    4. Chronic diastolic congestive heart failure            PLAN      1. Lymphedema of both lower extremities  Chronic problem  Continue using lymphedema pump 2 times every day  Continue Bumex 1 mg daily      2. Venous stasis dermatitis of both lower extremities  - ammonium lactate 12 % Crea; Apply 1 g topically 2 (two) times daily.  Dispense: 280 g; Refill: 0      3. Chronic  diastolic congestive heart failure  Echocardiogram done in 2/2022 reviewed- diastolic heart failure with EF of 65% and pulmonary hypertension, normal systolic function  Follows up with Cardiology, next appointment in June with Dr Polo  Continue Bumex 1 mg daily along with potassium  Low-salt diet, monitor daily weights at home  BMP in future      4. Encounter to establish care with new doctor       5. COPD  Stable  Continued using Symbicort and albuterol inhaler as needed      6. HTN  Continue same medications  Low salt diet, regular exercise      SCREENINGS      Immunizations:   Up-to-date with COVID and flu vaccine, had tetanus on 12/2021      Age/demographic appropriate health maintenance:  Up-to-date      Ata Vines   4/11/2022

## 2022-06-13 NOTE — TELEPHONE ENCOUNTER
----- Message from Kat Dillon sent at 6/13/2022  1:48 PM CDT -----  Regarding: Refills  Pt need refills on his Bumex 1 mg, Potassium Chloride 20 meq, and Valsartan 320 mg    [id number] - [place], [place] - [address]   Phone:  [phone]  Fax:  [phone]      Thanks

## 2022-06-14 RX ORDER — VALSARTAN 320 MG/1
320 TABLET ORAL DAILY
Qty: 90 TABLET | Refills: 3 | Status: SHIPPED | OUTPATIENT
Start: 2022-06-14 | End: 2022-06-27 | Stop reason: SDUPTHER

## 2022-06-14 RX ORDER — POTASSIUM CHLORIDE 20 MEQ/1
20 TABLET, EXTENDED RELEASE ORAL DAILY
Qty: 90 TABLET | Refills: 3 | Status: SHIPPED | OUTPATIENT
Start: 2022-06-14 | End: 2022-06-27 | Stop reason: SDUPTHER

## 2022-06-14 RX ORDER — VALSARTAN 320 MG/1
320 TABLET ORAL DAILY
Qty: 90 TABLET | Refills: 3 | Status: SHIPPED | OUTPATIENT
Start: 2022-06-14 | End: 2023-01-01

## 2022-06-14 RX ORDER — BUMETANIDE 1 MG/1
1 TABLET ORAL DAILY
Qty: 90 TABLET | Refills: 3 | Status: SHIPPED | OUTPATIENT
Start: 2022-06-14 | End: 2022-06-27 | Stop reason: SDUPTHER

## 2022-06-14 RX ORDER — POTASSIUM CHLORIDE 20 MEQ/1
20 TABLET, EXTENDED RELEASE ORAL DAILY
Qty: 90 TABLET | Refills: 3 | Status: SHIPPED | OUTPATIENT
Start: 2022-06-14 | End: 2023-01-01 | Stop reason: SDUPTHER

## 2022-06-14 RX ORDER — BUMETANIDE 1 MG/1
1 TABLET ORAL DAILY
Qty: 90 TABLET | Refills: 3 | Status: SHIPPED | OUTPATIENT
Start: 2022-06-14 | End: 2023-01-01 | Stop reason: SDUPTHER

## 2022-06-14 NOTE — TELEPHONE ENCOUNTER
----- Message from Greer Gaytan sent at 6/14/2022  2:48 PM CDT -----  Regarding: refill  Requesting an RX refill or new RX.  Is this a refill or new RX: refill  RX name and strength   bumetanide (BUMEX) 1 MG tablet    potassium chloride SA (K-DUR,KLOR-CON) 20 MEQ tablet      valsartan (DIOVAN) 320 MG tablet      [id number] - [place], [place] - [address] [facility]   Phone:  [phone]  Fax:  [phone]          The doctors have asked that we provide their patients with the following 2 reminders -- prescription refills can take up to 72 hours, and a friendly reminder that in the future you can use your [facility] account to request refills:     Thank you

## 2022-06-27 ENCOUNTER — LAB VISIT (OUTPATIENT)
Dept: LAB | Facility: HOSPITAL | Age: 82
End: 2022-06-27
Attending: INTERNAL MEDICINE
Payer: MEDICARE

## 2022-06-27 ENCOUNTER — OFFICE VISIT (OUTPATIENT)
Dept: CARDIOLOGY | Facility: CLINIC | Age: 82
End: 2022-06-27
Payer: MEDICARE

## 2022-06-27 VITALS
WEIGHT: 215.38 LBS | BODY MASS INDEX: 30.15 KG/M2 | HEIGHT: 71 IN | DIASTOLIC BLOOD PRESSURE: 54 MMHG | HEART RATE: 73 BPM | SYSTOLIC BLOOD PRESSURE: 138 MMHG

## 2022-06-27 DIAGNOSIS — I10 PRIMARY HYPERTENSION: ICD-10-CM

## 2022-06-27 DIAGNOSIS — R06.02 SOB (SHORTNESS OF BREATH): ICD-10-CM

## 2022-06-27 DIAGNOSIS — I50.32 CHRONIC DIASTOLIC CONGESTIVE HEART FAILURE: ICD-10-CM

## 2022-06-27 DIAGNOSIS — I27.20 PULMONARY HYPERTENSION: ICD-10-CM

## 2022-06-27 DIAGNOSIS — E66.9 OBESITY (BMI 30-39.9): ICD-10-CM

## 2022-06-27 DIAGNOSIS — I87.2 VENOUS STASIS DERMATITIS OF BOTH LOWER EXTREMITIES: ICD-10-CM

## 2022-06-27 DIAGNOSIS — I89.0 LYMPHEDEMA OF BOTH LOWER EXTREMITIES: Primary | ICD-10-CM

## 2022-06-27 DIAGNOSIS — J44.9 CHRONIC OBSTRUCTIVE PULMONARY DISEASE, UNSPECIFIED COPD TYPE: ICD-10-CM

## 2022-06-27 DIAGNOSIS — I87.2 VENOUS INSUFFICIENCY OF BOTH LOWER EXTREMITIES: ICD-10-CM

## 2022-06-27 LAB
ALBUMIN SERPL BCP-MCNC: 3.6 G/DL (ref 3.5–5.2)
ALP SERPL-CCNC: 102 U/L (ref 55–135)
ALT SERPL W/O P-5'-P-CCNC: 14 U/L (ref 10–44)
ANION GAP SERPL CALC-SCNC: 9 MMOL/L (ref 8–16)
AST SERPL-CCNC: 20 U/L (ref 10–40)
BILIRUB SERPL-MCNC: 1.3 MG/DL (ref 0.1–1)
BNP SERPL-MCNC: 132 PG/ML (ref 0–99)
BUN SERPL-MCNC: 13 MG/DL (ref 8–23)
CALCIUM SERPL-MCNC: 9.2 MG/DL (ref 8.7–10.5)
CHLORIDE SERPL-SCNC: 97 MMOL/L (ref 95–110)
CO2 SERPL-SCNC: 26 MMOL/L (ref 23–29)
CREAT SERPL-MCNC: 0.7 MG/DL (ref 0.5–1.4)
EST. GFR  (AFRICAN AMERICAN): >60 ML/MIN/1.73 M^2
EST. GFR  (NON AFRICAN AMERICAN): >60 ML/MIN/1.73 M^2
GLUCOSE SERPL-MCNC: 85 MG/DL (ref 70–110)
POTASSIUM SERPL-SCNC: 4.6 MMOL/L (ref 3.5–5.1)
PROT SERPL-MCNC: 6.6 G/DL (ref 6–8.4)
SODIUM SERPL-SCNC: 132 MMOL/L (ref 136–145)

## 2022-06-27 PROCEDURE — 3075F PR MOST RECENT SYSTOLIC BLOOD PRESS GE 130-139MM HG: ICD-10-PCS | Mod: CPTII,S$GLB,, | Performed by: INTERNAL MEDICINE

## 2022-06-27 PROCEDURE — 99999 PR PBB SHADOW E&M-EST. PATIENT-LVL III: ICD-10-PCS | Mod: PBBFAC,,, | Performed by: INTERNAL MEDICINE

## 2022-06-27 PROCEDURE — 99215 OFFICE O/P EST HI 40 MIN: CPT | Mod: S$GLB,,, | Performed by: INTERNAL MEDICINE

## 2022-06-27 PROCEDURE — 36415 COLL VENOUS BLD VENIPUNCTURE: CPT | Mod: PO | Performed by: INTERNAL MEDICINE

## 2022-06-27 PROCEDURE — 1101F PT FALLS ASSESS-DOCD LE1/YR: CPT | Mod: CPTII,S$GLB,, | Performed by: INTERNAL MEDICINE

## 2022-06-27 PROCEDURE — 80053 COMPREHEN METABOLIC PANEL: CPT | Performed by: INTERNAL MEDICINE

## 2022-06-27 PROCEDURE — 3288F FALL RISK ASSESSMENT DOCD: CPT | Mod: CPTII,S$GLB,, | Performed by: INTERNAL MEDICINE

## 2022-06-27 PROCEDURE — 83880 ASSAY OF NATRIURETIC PEPTIDE: CPT | Performed by: INTERNAL MEDICINE

## 2022-06-27 PROCEDURE — 1126F PR PAIN SEVERITY QUANTIFIED, NO PAIN PRESENT: ICD-10-PCS | Mod: CPTII,S$GLB,, | Performed by: INTERNAL MEDICINE

## 2022-06-27 PROCEDURE — 3288F PR FALLS RISK ASSESSMENT DOCUMENTED: ICD-10-PCS | Mod: CPTII,S$GLB,, | Performed by: INTERNAL MEDICINE

## 2022-06-27 PROCEDURE — 99999 PR PBB SHADOW E&M-EST. PATIENT-LVL III: CPT | Mod: PBBFAC,,, | Performed by: INTERNAL MEDICINE

## 2022-06-27 PROCEDURE — 3078F DIAST BP <80 MM HG: CPT | Mod: CPTII,S$GLB,, | Performed by: INTERNAL MEDICINE

## 2022-06-27 PROCEDURE — 99215 PR OFFICE/OUTPT VISIT, EST, LEVL V, 40-54 MIN: ICD-10-PCS | Mod: S$GLB,,, | Performed by: INTERNAL MEDICINE

## 2022-06-27 PROCEDURE — 3075F SYST BP GE 130 - 139MM HG: CPT | Mod: CPTII,S$GLB,, | Performed by: INTERNAL MEDICINE

## 2022-06-27 PROCEDURE — 1159F MED LIST DOCD IN RCRD: CPT | Mod: CPTII,S$GLB,, | Performed by: INTERNAL MEDICINE

## 2022-06-27 PROCEDURE — 1101F PR PT FALLS ASSESS DOC 0-1 FALLS W/OUT INJ PAST YR: ICD-10-PCS | Mod: CPTII,S$GLB,, | Performed by: INTERNAL MEDICINE

## 2022-06-27 PROCEDURE — 1159F PR MEDICATION LIST DOCUMENTED IN MEDICAL RECORD: ICD-10-PCS | Mod: CPTII,S$GLB,, | Performed by: INTERNAL MEDICINE

## 2022-06-27 PROCEDURE — 3078F PR MOST RECENT DIASTOLIC BLOOD PRESSURE < 80 MM HG: ICD-10-PCS | Mod: CPTII,S$GLB,, | Performed by: INTERNAL MEDICINE

## 2022-06-27 PROCEDURE — 1126F AMNT PAIN NOTED NONE PRSNT: CPT | Mod: CPTII,S$GLB,, | Performed by: INTERNAL MEDICINE

## 2022-06-27 NOTE — PATIENT INSTRUCTIONS
Assessment/Plan:  Alexander Kelly is a 82 y.o. male with HFpEF, HTN, COPD, bilateral hearing loss, alcoholism, obesity, who presents for a follow up appointment.    1. BLE Lymphedema with Venous Stasis Wounds- Wounds have now completely healed.  BLE Venous Ultrasound on 2/15/2022 revealed no findings of right or left lower extremity deep venous thrombophlebitis.  Continue bumex 1 mg daily.    Pt to limit sodium intake to 2,000 mg daily.  Limit volume intake to 1.5 liters daily.      2. Chronic HFpEF- Stable.  Echo as above.  Continue current medications.  Check cmp and lipids.     3. HTN- Continue current medications.  Pt to keep log of blood pressure/heart rate and bring in next visit for review.     4. Obesity- Encourage diet, exercise and weight loss.      Follow up in 4 months with lipids prior.

## 2022-06-27 NOTE — PROGRESS NOTES
[facility]      Chief Complaint   Patient presents with    Pulmonary Hypertension    Venous stasis dermatitis of both lower extremities       Patient ID: Alexander Kelly is a 82 y.o. male with HFpEF, HTN, COPD, bilateral hearing loss, alcoholism, obesity, who presents for a follow up appointment.  Pertinent history/events are as follows:     -Pt kindly referred by Dr. Martin for evaluation of lymphedema.    -At our initial clinic visit on 2/9/2022, Mr. Kelly states he was diagnosed with BLE lymphedema 4 years ago.  He uses a lymphedema pump daily.  Reports gaining 30 pounds in 3 weeks in 12/2021.  States he lost the weight after being started on bumex and metolazone.  Reports SOB starting 1 month ago, which is new for him.  He currently takes bumex 1 mg daily, and metolazone 2.5 mg daily at needed for weight gain.  Formerly smoked 1 pack a day for 16 years.  Quit at age 32.  Plan:   BLE Lymphedema with Venous Stasis Wounds- Check BLE venous reflux study and TUNDE study.  Refer to lymphedema clinic.  Continue bumex 1 mg daily.  Continue metolazone 2.5 mg daily at needed for weight gain of at least 2 pounds in a day or 5 pounds in a week.  Refer to wound care.  Given rash, refer to Dermatology for consideration for biopsy.   Pt to limit sodium intake to 2,000 mg daily.  Limit volume intake to 1.5 liters daily.    SOB- Check echo, CXR, and BNP.    HTN- Continue current medications.  Pt to keep log of blood pressure/heart rate and bring in next visit for review.   Obesity- Encourage diet, exercise and weight loss.    -2/14/2022: I talked with Mr. Kelly in detail.  He has severe SOB.  Echo done today shows:   Echo 2/14/2022:  · The left ventricle is normal in size with normal systolic function.  · The estimated ejection fraction is 65%.  · Grade II left ventricular diastolic dysfunction.  · The ascending aorta is mildly dilated.  · Mild right ventricular enlargement with normal right ventricular systolic  function.  · Mild tricuspid regurgitation.  · The estimated PA systolic pressure is 66 mmHg.  · The IVC is enlarged, measuring >3cm. Elevated central venous pressure (15 mmHg).  · There is pulmonary hypertension.  · Biatrial enlargement.  · Moderate circumferential pericardial effusion with largest diameter posterolaterally at 1.5cm.  · There is a left pleural effusion    Outside labs from 2/10/2022 shows serum sodium of 126 with potassium of 5.2.  Given these findings and his severe SOB, pt will require inpatient admission and further management of severe volume overload.  I instructed Mr. Kelly to report immediately to the emergency department for admission.  His wife states they live in [place] and will go to [facility] for admission.      -On 2/14/2022, pt was admitted to [facility] with acute hypoxic respiratory failure due to volume overload with acute on chronic diastolic heart failure exacerbation. He was initiated on CHF pathway with IV lasix. Good diuresis with improvement in hypoxia and dyspnea. Initially required lasix gtt which was then de-escalated to IV pushes then PO bumex.  Weight at discharge was 240 pounds.     -At follow up clinic visit on 3/23/2022, Mr. Kelly was accompanied by his wife.  States he feels much better since hospital discharge.  He has no chest pain or SOB.  States he's taking all medications as prescribed with no issues.   Plan:   BLE Lymphedema with Venous Stasis Wounds- Wounds have now completely healed.  BLE Venous Ultrasound on 2/15/2022 revealed no findings of right or left lower extremity deep venous thrombophlebitis.  Continue bumex 1 mg daily.    Pt to limit sodium intake to 2,000 mg daily.  Limit volume intake to 1.5 liters daily.    SOB- Due to acute on chronic HFPEF, which has now resolved.  Echo as above.  Continue current medications.    HTN- Continue current medications.  Pt to keep log of blood pressure/heart rate and bring in next visit for review.    Obesity- Encourage diet, exercise and weight loss.      HPI:  Mr. Kelly is accompanied by his wife.  He reports doing well with no chest pain, SOB, significant LE edema, TIA symptoms or syncope.  States he's taking all medications as prescribed with no issues.       Past Medical History:   Diagnosis Date    Bilateral hearing loss     Hypertension      History reviewed. No pertinent surgical history.  Social History     Socioeconomic History    Marital status:    Tobacco Use    Smoking status: Never Smoker    Smokeless tobacco: Never Used     Social Determinants of Health     Financial Resource Strain: Low Risk     Difficulty of Paying Living Expenses: Not hard at all   Food Insecurity: No Food Insecurity    Worried About Running Out of Food in the Last Year: Never true    Ran Out of Food in the Last Year: Never true   Transportation Needs: No Transportation Needs    Lack of Transportation (Medical): No    Lack of Transportation (Non-Medical): No   Social Connections: Unknown    Marital Status:    Housing Stability: Low Risk     Unable to Pay for Housing in the Last Year: No    Number of Places Lived in the Last Year: 1    Unstable Housing in the Last Year: No     Family History   Problem Relation Age of Onset    No Known Problems Mother     No Known Problems Father        Review of patient's allergies indicates:  No Known Allergies    Medication List with Changes/Refills   Current Medications    ALBUTEROL (VENTOLIN HFA) 90 MCG/ACTUATION INHALER    Inhale 2 puffs into the lungs every 6 (six) hours as needed for Wheezing. Rescue    AMMONIUM LACTATE 12 % CREA    Apply 1 g topically 2 (two) times daily.    BUDESONIDE-FORMOTEROL 160-4.5 MCG (SYMBICORT) 160-4.5 MCG/ACTUATION HFAA    Inhale 2 puffs into the lungs every 12 (twelve) hours.    BUMETANIDE (BUMEX) 1 MG TABLET    Take 1 tablet (1 mg total) by mouth once daily.    POTASSIUM CHLORIDE SA (K-DUR,KLOR-CON) 20 MEQ TABLET    Take 1  "tablet (20 mEq total) by mouth once daily.    VALSARTAN (DIOVAN) 320 MG TABLET    Take 1 tablet (320 mg total) by mouth once daily.   Discontinued Medications    BUMETANIDE (BUMEX) 1 MG TABLET    Take 1 tablet (1 mg total) by mouth once daily.    POTASSIUM CHLORIDE SA (K-DUR,KLOR-CON) 20 MEQ TABLET    Take 1 tablet (20 mEq total) by mouth once daily.    VALSARTAN (DIOVAN) 320 MG TABLET    Take 1 tablet (320 mg total) by mouth once daily.       Review of Systems  Constitution: Denies chills, fever, and sweats.  HENT: Denies headaches or blurry vision.  Cardiovascular: Denies chest pain or irregular heart beat.  Respiratory: Denies cough or shortness of breath.  Gastrointestinal: Denies abdominal pain, nausea, or vomiting.  Musculoskeletal: Negative for leg swelling.  Neurological: Denies dizziness or focal weakness.  Psychiatric/Behavioral: Normal mental status.  Hematologic/Lymphatic: Denies bleeding problem or easy bruising/bleeding.  Skin: Denies rash or suspicious lesions    Physical Examination  BP (!) 138/54   Pulse 73   Ht 5' 11" (1.803 m)   Wt 97.7 kg (215 lb 6.2 oz)   BMI 30.04 kg/m²     Constitutional: No acute distress, conversant  HEENT: Sclera anicteric, Pupils equal, round and reactive to light, extraocular motions intact, Oropharynx clear  Neck: No JVD, no carotid bruits  Cardiovascular: regular rate and rhythm, no murmur, rubs or gallops, normal S1/S2  Pulmonary: Clear to auscultation bilaterally  Abdominal: Abdomen soft, nontender, nondistended, positive bowel sounds  Extremities: BLE's with trace pitting edema and changes consistent with lymphedema  Left shin with a moderate sized healing wound.   Both legs with diffuse, erythematous, lacy rash    Pulses:  Carotid pulses are 2+ on the right side, and 2+ on the left side.  Radial pulses are 2+ on the right side, and 2+ on the left side.   Femoral pulses are 2+ on the right side, and 2+ on the left side.  Popliteal pulses are 2+ on the right side, " and 2+ on the left side.   Dorsalis pedis pulses are 2+ on the right side, and 2+ on the left side.   Posterior tibial pulses are 2+ on the right side, and 2+ on the left side.    Skin: No ecchymosis, erythema, or ulcers  Psych: Alert and oriented x 3, appropriate affect  Neuro: CNII-XII intact, no focal deficits    Labs:  Most Recent Data  CBC:   Lab Results   Component Value Date    WBC 7.61 02/14/2022    HGB 14.6 02/14/2022    HCT 44.7 02/14/2022     02/14/2022    MCV 93 02/14/2022    RDW 15.4 (H) 02/14/2022     BMP:   Lab Results   Component Value Date     (L) 03/10/2022    K 4.9 03/10/2022    CL 97 03/10/2022    CO2 21 (L) 03/10/2022    BUN 14 03/10/2022    CREATININE 0.8 03/10/2022    GLU 93 03/10/2022    CALCIUM 9.2 03/10/2022    MG 2.0 02/15/2022     LFTS;   Lab Results   Component Value Date    PROT 6.8 03/10/2022    ALBUMIN 3.3 (L) 03/10/2022    BILITOT 1.0 03/10/2022    AST 42 (H) 03/10/2022    ALKPHOS 162 (H) 03/10/2022    ALT 34 03/10/2022     COAGS: No results found for: INR, PROTIME, PTT  FLP: No results found for: CHOL, HDL, LDLCALC, TRIG, CHOLHDL  CARDIAC:   Lab Results   Component Value Date    TROPONINI 0.016 02/15/2022     (H) 02/14/2022       Echo 2/14/2022:  · The left ventricle is normal in size with normal systolic function.  · The estimated ejection fraction is 65%.  · Grade II left ventricular diastolic dysfunction.  · The ascending aorta is mildly dilated.  · Mild right ventricular enlargement with normal right ventricular systolic function.  · Mild tricuspid regurgitation.  · The estimated PA systolic pressure is 66 mmHg.  · The IVC is enlarged, measuring >3cm. Elevated central venous pressure (15 mmHg).  · There is pulmonary hypertension.  · Biatrial enlargement.  · Moderate circumferential pericardial effusion with largest diameter posterolaterally at 1.5cm.  · There is a left pleural effusion.    BLE Venous Ultrasound 2/15/2022:  1. No findings of right or left  lower extremity deep venous thrombophlebitis  2. Bilateral lower extremity soft tissue edema.  3. Prominent enlarged left groin lymph node.    Assessment/Plan:  Alexander Kelly is a 82 y.o. male with HFpEF, HTN, COPD, bilateral hearing loss, alcoholism, obesity, who presents for a follow up appointment.    1. BLE Lymphedema with Venous Stasis Wounds- Wounds have now completely healed.  BLE Venous Ultrasound on 2/15/2022 revealed no findings of right or left lower extremity deep venous thrombophlebitis.  Continue bumex 1 mg daily.    Pt to limit sodium intake to 2,000 mg daily.  Limit volume intake to 1.5 liters daily.      2. Chronic HFpEF- Stable.  Echo as above.  Continue current medications.  Check cmp and lipids.     3. HTN- Continue current medications.  Pt to keep log of blood pressure/heart rate and bring in next visit for review.     4. Obesity- Encourage diet, exercise and weight loss.      Follow up in 4 months with lipids prior.    Total duration of face to face visit time 30 minutes.  Total time spent counseling greater than fifty percent of total visit time.  Counseling included discussion regarding imaging findings, diagnosis, possibilities, treatment options, risks and benefits.  The patient had many questions regarding the options and long-term effects.    Artur Healy MD, PhD  Interventional Cardiology

## 2022-07-13 ENCOUNTER — TELEPHONE (OUTPATIENT)
Dept: CARDIOLOGY | Facility: CLINIC | Age: 82
End: 2022-07-13
Payer: MEDICARE

## 2022-07-13 NOTE — TELEPHONE ENCOUNTER
Pt wife  would like results from 6/27 about his kidney. Advised pt I will send Dr. Healy a message and he will give her a call back   ----- Message from Greer Gaytan sent at 7/13/2022  2:39 PM CDT -----  Regarding: lab results  Pls call pt regarding lab results   [phone]    Thank you

## 2022-09-28 ENCOUNTER — OFFICE VISIT (OUTPATIENT)
Dept: URGENT CARE | Facility: CLINIC | Age: 82
End: 2022-09-28
Payer: MEDICARE

## 2022-09-28 VITALS
DIASTOLIC BLOOD PRESSURE: 72 MMHG | HEART RATE: 88 BPM | OXYGEN SATURATION: 97 % | TEMPERATURE: 99 F | SYSTOLIC BLOOD PRESSURE: 155 MMHG | WEIGHT: 215 LBS | RESPIRATION RATE: 18 BRPM | BODY MASS INDEX: 30.1 KG/M2 | HEIGHT: 71 IN

## 2022-09-28 DIAGNOSIS — H61.23 BILATERAL IMPACTED CERUMEN: Primary | ICD-10-CM

## 2022-09-28 PROCEDURE — 3077F SYST BP >= 140 MM HG: CPT | Mod: CPTII,S$GLB,,

## 2022-09-28 PROCEDURE — 1159F MED LIST DOCD IN RCRD: CPT | Mod: CPTII,S$GLB,,

## 2022-09-28 PROCEDURE — 1160F RVW MEDS BY RX/DR IN RCRD: CPT | Mod: CPTII,S$GLB,,

## 2022-09-28 PROCEDURE — 3078F DIAST BP <80 MM HG: CPT | Mod: CPTII,S$GLB,,

## 2022-09-28 PROCEDURE — 3077F PR MOST RECENT SYSTOLIC BLOOD PRESSURE >= 140 MM HG: ICD-10-PCS | Mod: CPTII,S$GLB,,

## 2022-09-28 PROCEDURE — 69209 REMOVE IMPACTED EAR WAX UNI: CPT | Mod: 50,S$GLB,,

## 2022-09-28 PROCEDURE — 99214 OFFICE O/P EST MOD 30 MIN: CPT | Mod: 25,S$GLB,,

## 2022-09-28 PROCEDURE — 1126F AMNT PAIN NOTED NONE PRSNT: CPT | Mod: CPTII,S$GLB,,

## 2022-09-28 PROCEDURE — 1159F PR MEDICATION LIST DOCUMENTED IN MEDICAL RECORD: ICD-10-PCS | Mod: CPTII,S$GLB,,

## 2022-09-28 PROCEDURE — 99214 PR OFFICE/OUTPT VISIT, EST, LEVL IV, 30-39 MIN: ICD-10-PCS | Mod: 25,S$GLB,,

## 2022-09-28 PROCEDURE — 1160F PR REVIEW ALL MEDS BY PRESCRIBER/CLIN PHARMACIST DOCUMENTED: ICD-10-PCS | Mod: CPTII,S$GLB,,

## 2022-09-28 PROCEDURE — 3078F PR MOST RECENT DIASTOLIC BLOOD PRESSURE < 80 MM HG: ICD-10-PCS | Mod: CPTII,S$GLB,,

## 2022-09-28 PROCEDURE — 69209 EAR CERUMEN REMOVAL: ICD-10-PCS | Mod: 50,S$GLB,,

## 2022-09-28 PROCEDURE — 1126F PR PAIN SEVERITY QUANTIFIED, NO PAIN PRESENT: ICD-10-PCS | Mod: CPTII,S$GLB,,

## 2022-09-28 NOTE — PROGRESS NOTES
"Subjective:       Patient ID: Alexander Kelly is a 82 y.o. male.    Vitals:  height is 5' 11" (1.803 m) and weight is 97.5 kg (215 lb). His temperature is 98.8 °F (37.1 °C). His blood pressure is 155/72 (abnormal) and his pulse is 88. His respiration is 18 and oxygen saturation is 97%.     Chief Complaint: Ear Fullness    Patient presents to clinic with clogged ears, and will like to get both ears cleaned due to getting new hearing aids soon.    Ear Fullness   There is pain in both ears. Pertinent negatives include no abdominal pain, ear discharge, neck pain, rash, sore throat or vomiting.     Constitution: Negative. Negative for activity change and appetite change.   HENT: Negative.  Negative for ear pain, ear discharge, postnasal drip, sinus pain, sinus pressure, sore throat and trouble swallowing.         Reports decreased hearing in bilateral ears that has gotten progressively worse for months. PT reprots has had this before and was ear wax impaction.    Neck: neck negative. Negative for neck pain and painful lymph nodes.   Cardiovascular: Negative.  Negative for chest trauma and chest pain.   Respiratory: Negative.  Negative for chest tightness and shortness of breath.    Gastrointestinal: Negative.  Negative for abdominal pain, nausea and vomiting.   Endocrine: negative. hair loss.   Musculoskeletal: Negative.  Negative for pain and back pain.   Skin: Negative.  Negative for rash and hives.   Allergic/Immunologic: Negative for hives.   Neurological: Negative.  Negative for dizziness, light-headedness, disorientation and altered mental status.   Hematologic/Lymphatic: Negative.  Negative for swollen lymph nodes and easy bruising/bleeding. Does not bruise/bleed easily.   Psychiatric/Behavioral: Negative.  Negative for altered mental status and disorientation.      Objective:      Physical Exam   Constitutional: He is oriented to person, place, and time.  Non-toxic appearance. He does not appear ill. No distress. "   HENT:   Head: Normocephalic and atraumatic.   Ears:   Right Ear: No lacerations. There is cerumen present. No drainage, swelling or tenderness. No foreign bodies. No mastoid tenderness. Tympanic membrane is not injected, not scarred, not perforated, not erythematous, not retracted and not bulging. Tympanic membrane mobility is normal. No middle ear effusion. No hemotympanum.   Left Ear: No lacerations. There is cerumen present. No drainage, swelling or tenderness. No foreign bodies. No mastoid tenderness. Tympanic membrane is not injected, not scarred, not perforated, not erythematous, not retracted and not bulging. Tympanic membrane mobility is normal.  No middle ear effusion. No hemotympanum.   Eyes: Extraocular movement intact   Abdominal: Normal appearance.   Neurological: He is alert and oriented to person, place, and time.   Skin: Skin is dry and not diaphoretic.   Psychiatric: His behavior is normal. Mood normal.   Nursing note and vitals reviewed.    Ear Cerumen Removal    Date/Time: 9/28/2022 2:30 PM  Performed by: Jaylen Griggs NP  Authorized by: Jaylen Griggs NP     Consent Done?:  Yes (Verbal)  Medication Used:  Cerumenex  Location details:  Both ears  Procedure type: irrigation    Cerumen  Removal Results:  Cerumen completely removed  Patient tolerance:  Patient tolerated the procedure well with no immediate complications     Bilateral Tms intact  Bilateral Tms intact after procedure.    Assessment:       1. Bilateral impacted cerumen          Plan:         Bilateral impacted cerumen  -     Ear Cerumen Removal         Medical Decision Making:   Initial Assessment:   PT in room AAOX4, skin W/D, resp E/U, non toxic in appearance, NAD.    Urgent Care Management:  Discussed with PT cerumen impaction. Bilateral ears clogged. Ear canal canals after none swollen, non tender, non red. Bilateral TM intact non bulging, non injected non inflamed and non effusion noted. Discussed with PT to Fu with PCP or  ENT for further treatment. PT agrees with treatment and plan and verbalized understanding PT ambulatory from clinic NAD.

## 2022-09-28 NOTE — PATIENT INSTRUCTIONS
Ear Wax   - Use Over the Counter Debrox as directed  - Avoid cleaning ears with any foreign objects  - Follow up with here or with your PCP for no improvement of symptoms  - Follow up in the ER for any worsening of symptoms.   -  Please return here or go to the Emergency Department for any concerns or worsening of condition.  -  Please follow up with your primary care doctor or specialist in the next 48-72hrs as needed.

## 2022-09-28 NOTE — PROCEDURES
Ear Cerumen Removal    Date/Time: 9/28/2022 2:30 PM  Performed by: Jaylen Griggs NP  Authorized by: Jaylen Griggs NP     Consent Done?:  Yes (Verbal)  Medication Used:  Cerumenex  Location details:  Both ears  Procedure type: irrigation    Cerumen  Removal Results:  Cerumen completely removed  Patient tolerance:  Patient tolerated the procedure well with no immediate complications     Bilateral Tms intact  Bilateral Tms intact after procedure.

## 2022-12-29 DIAGNOSIS — I10 PRIMARY HYPERTENSION: ICD-10-CM

## 2022-12-29 DIAGNOSIS — I50.32 CHRONIC DIASTOLIC CONGESTIVE HEART FAILURE: ICD-10-CM

## 2022-12-29 DIAGNOSIS — E66.9 OBESITY (BMI 30-39.9): Primary | ICD-10-CM

## 2023-01-01 ENCOUNTER — TELEPHONE (OUTPATIENT)
Dept: TRANSPLANT | Facility: CLINIC | Age: 83
End: 2023-01-01
Payer: MEDICARE

## 2023-01-01 ENCOUNTER — OFFICE VISIT (OUTPATIENT)
Dept: TRANSPLANT | Facility: CLINIC | Age: 83
End: 2023-01-01
Attending: INTERNAL MEDICINE
Payer: MEDICARE

## 2023-01-01 ENCOUNTER — HOSPITAL ENCOUNTER (OUTPATIENT)
Dept: RADIOLOGY | Facility: HOSPITAL | Age: 83
Discharge: HOME | End: 2023-07-14
Attending: INTERNAL MEDICINE
Payer: MEDICARE

## 2023-01-01 ENCOUNTER — HOSPITAL ENCOUNTER (OUTPATIENT)
Dept: CARDIOLOGY | Facility: HOSPITAL | Age: 83
Discharge: HOME | End: 2023-07-14
Attending: INTERNAL MEDICINE
Payer: MEDICARE

## 2023-01-01 ENCOUNTER — HOSPITAL ENCOUNTER (INPATIENT)
Facility: HOSPITAL | Age: 83
LOS: 2 days | Discharge: HOME | DRG: 291 | End: 2023-05-27
Attending: EMERGENCY MEDICINE | Admitting: INTERNAL MEDICINE
Payer: MEDICARE

## 2023-01-01 ENCOUNTER — OFFICE VISIT (OUTPATIENT)
Dept: CARDIOLOGY | Facility: CLINIC | Age: 83
End: 2023-01-01
Payer: MEDICARE

## 2023-01-01 ENCOUNTER — NURSE TRIAGE (OUTPATIENT)
Dept: ADMINISTRATIVE | Facility: CLINIC | Age: 83
End: 2023-01-01
Payer: MEDICARE

## 2023-01-01 ENCOUNTER — HOSPITAL ENCOUNTER (OUTPATIENT)
Facility: HOSPITAL | Age: 83
Discharge: HOME | End: 2023-08-04
Attending: INTERNAL MEDICINE | Admitting: INTERNAL MEDICINE
Payer: MEDICARE

## 2023-01-01 ENCOUNTER — TELEPHONE (OUTPATIENT)
Dept: CARDIOLOGY | Facility: CLINIC | Age: 83
End: 2023-01-01
Payer: MEDICARE

## 2023-01-01 ENCOUNTER — HOSPITAL ENCOUNTER (OUTPATIENT)
Facility: HOSPITAL | Age: 83
Discharge: HOME | End: 2023-07-05
Attending: EMERGENCY MEDICINE | Admitting: INTERNAL MEDICINE
Payer: MEDICARE

## 2023-01-01 VITALS
BODY MASS INDEX: 30.53 KG/M2 | HEIGHT: 71 IN | RESPIRATION RATE: 20 BRPM | OXYGEN SATURATION: 96 % | TEMPERATURE: 98 F | WEIGHT: 218.06 LBS | DIASTOLIC BLOOD PRESSURE: 81 MMHG | HEART RATE: 76 BPM | SYSTOLIC BLOOD PRESSURE: 149 MMHG

## 2023-01-01 VITALS
OXYGEN SATURATION: 94 % | SYSTOLIC BLOOD PRESSURE: 141 MMHG | BODY MASS INDEX: 31.64 KG/M2 | HEART RATE: 80 BPM | WEIGHT: 226 LBS | WEIGHT: 218 LBS | DIASTOLIC BLOOD PRESSURE: 69 MMHG | DIASTOLIC BLOOD PRESSURE: 72 MMHG | HEART RATE: 83 BPM | HEIGHT: 71 IN | HEIGHT: 71 IN | BODY MASS INDEX: 30.52 KG/M2 | SYSTOLIC BLOOD PRESSURE: 151 MMHG

## 2023-01-01 VITALS
RESPIRATION RATE: 18 BRPM | SYSTOLIC BLOOD PRESSURE: 180 MMHG | HEART RATE: 91 BPM | DIASTOLIC BLOOD PRESSURE: 82 MMHG | TEMPERATURE: 98 F | OXYGEN SATURATION: 95 % | WEIGHT: 223.81 LBS | BODY MASS INDEX: 31.33 KG/M2 | HEIGHT: 71 IN

## 2023-01-01 VITALS
OXYGEN SATURATION: 97 % | TEMPERATURE: 97 F | WEIGHT: 222 LBS | SYSTOLIC BLOOD PRESSURE: 151 MMHG | DIASTOLIC BLOOD PRESSURE: 78 MMHG | RESPIRATION RATE: 18 BRPM | HEART RATE: 83 BPM | BODY MASS INDEX: 30.96 KG/M2

## 2023-01-01 VITALS
DIASTOLIC BLOOD PRESSURE: 67 MMHG | HEART RATE: 71 BPM | SYSTOLIC BLOOD PRESSURE: 147 MMHG | HEIGHT: 71 IN | BODY MASS INDEX: 32.29 KG/M2 | WEIGHT: 230.63 LBS

## 2023-01-01 DIAGNOSIS — I89.0 LYMPHEDEMA OF BOTH LOWER EXTREMITIES: ICD-10-CM

## 2023-01-01 DIAGNOSIS — I10 PRIMARY HYPERTENSION: ICD-10-CM

## 2023-01-01 DIAGNOSIS — I50.33 ACUTE ON CHRONIC DIASTOLIC HEART FAILURE: ICD-10-CM

## 2023-01-01 DIAGNOSIS — I50.33 ACUTE ON CHRONIC HEART FAILURE WITH PRESERVED EJECTION FRACTION: Primary | ICD-10-CM

## 2023-01-01 DIAGNOSIS — R06.02 SOB (SHORTNESS OF BREATH): ICD-10-CM

## 2023-01-01 DIAGNOSIS — I50.33 ACUTE ON CHRONIC DIASTOLIC CONGESTIVE HEART FAILURE: ICD-10-CM

## 2023-01-01 DIAGNOSIS — I87.2 VENOUS STASIS DERMATITIS OF BOTH LOWER EXTREMITIES: ICD-10-CM

## 2023-01-01 DIAGNOSIS — I83.022 VENOUS STASIS ULCER OF LEFT CALF LIMITED TO BREAKDOWN OF SKIN WITH VARICOSE VEINS: ICD-10-CM

## 2023-01-01 DIAGNOSIS — J44.1 COPD EXACERBATION: ICD-10-CM

## 2023-01-01 DIAGNOSIS — I27.20 PULMONARY HYPERTENSION: ICD-10-CM

## 2023-01-01 DIAGNOSIS — I50.33 ACUTE ON CHRONIC DIASTOLIC CONGESTIVE HEART FAILURE: Primary | ICD-10-CM

## 2023-01-01 DIAGNOSIS — D64.9 NORMOCYTIC ANEMIA: ICD-10-CM

## 2023-01-01 DIAGNOSIS — E80.6 HYPERBILIRUBINEMIA: ICD-10-CM

## 2023-01-01 DIAGNOSIS — E87.5 HYPERKALEMIA: ICD-10-CM

## 2023-01-01 DIAGNOSIS — I73.9 PAD (PERIPHERAL ARTERY DISEASE): Primary | ICD-10-CM

## 2023-01-01 DIAGNOSIS — I50.32 CHRONIC DIASTOLIC CONGESTIVE HEART FAILURE: ICD-10-CM

## 2023-01-01 DIAGNOSIS — J44.9 CHRONIC OBSTRUCTIVE PULMONARY DISEASE, UNSPECIFIED COPD TYPE: ICD-10-CM

## 2023-01-01 DIAGNOSIS — L97.221 VENOUS STASIS ULCER OF LEFT CALF LIMITED TO BREAKDOWN OF SKIN WITH VARICOSE VEINS: ICD-10-CM

## 2023-01-01 DIAGNOSIS — I31.39 PERICARDIAL EFFUSION: ICD-10-CM

## 2023-01-01 DIAGNOSIS — J90 PLEURAL EFFUSION: ICD-10-CM

## 2023-01-01 DIAGNOSIS — E87.1 HYPONATREMIA: ICD-10-CM

## 2023-01-01 DIAGNOSIS — I73.9 PAD (PERIPHERAL ARTERY DISEASE): ICD-10-CM

## 2023-01-01 DIAGNOSIS — D64.9 ANEMIA, UNSPECIFIED TYPE: ICD-10-CM

## 2023-01-01 DIAGNOSIS — R13.13 PHARYNGEAL DYSPHAGIA: ICD-10-CM

## 2023-01-01 DIAGNOSIS — I50.9 ACUTE ON CHRONIC CONGESTIVE HEART FAILURE, UNSPECIFIED HEART FAILURE TYPE: ICD-10-CM

## 2023-01-01 DIAGNOSIS — J43.2 CENTRILOBULAR EMPHYSEMA: ICD-10-CM

## 2023-01-01 DIAGNOSIS — I87.2 VENOUS INSUFFICIENCY OF BOTH LOWER EXTREMITIES: ICD-10-CM

## 2023-01-01 DIAGNOSIS — I27.20 PULMONARY HYPERTENSION: Primary | ICD-10-CM

## 2023-01-01 DIAGNOSIS — I48.3 TYPICAL ATRIAL FLUTTER: ICD-10-CM

## 2023-01-01 DIAGNOSIS — R06.02 SHORTNESS OF BREATH: ICD-10-CM

## 2023-01-01 DIAGNOSIS — E66.9 OBESITY (BMI 30-39.9): ICD-10-CM

## 2023-01-01 DIAGNOSIS — I50.9 ACUTE ON CHRONIC HEART FAILURE: ICD-10-CM

## 2023-01-01 LAB
ALBUMIN SERPL BCP-MCNC: 3.2 G/DL (ref 3.5–5.2)
ALBUMIN SERPL BCP-MCNC: 3.3 G/DL (ref 3.5–5.2)
ALBUMIN SERPL BCP-MCNC: 3.4 G/DL (ref 3.5–5.2)
ALBUMIN SERPL BCP-MCNC: 3.4 G/DL (ref 3.5–5.2)
ALBUMIN SERPL BCP-MCNC: 3.5 G/DL (ref 3.5–5.2)
ALP SERPL-CCNC: 114 U/L (ref 55–135)
ALP SERPL-CCNC: 117 U/L (ref 55–135)
ALP SERPL-CCNC: 130 U/L (ref 55–135)
ALP SERPL-CCNC: 151 U/L (ref 55–135)
ALP SERPL-CCNC: 160 U/L (ref 55–135)
ALT SERPL W/O P-5'-P-CCNC: 20 U/L (ref 10–44)
ALT SERPL W/O P-5'-P-CCNC: 21 U/L (ref 10–44)
ALT SERPL W/O P-5'-P-CCNC: 21 U/L (ref 10–44)
ALT SERPL W/O P-5'-P-CCNC: 23 U/L (ref 10–44)
ALT SERPL W/O P-5'-P-CCNC: 29 U/L (ref 10–44)
ANION GAP SERPL CALC-SCNC: 10 MMOL/L (ref 8–16)
ANION GAP SERPL CALC-SCNC: 10 MMOL/L (ref 8–16)
ANION GAP SERPL CALC-SCNC: 11 MMOL/L (ref 8–16)
ANION GAP SERPL CALC-SCNC: 11 MMOL/L (ref 8–16)
ANION GAP SERPL CALC-SCNC: 5 MMOL/L (ref 8–16)
ANION GAP SERPL CALC-SCNC: 6 MMOL/L (ref 8–16)
ASCENDING AORTA: 3.81 CM
AST SERPL-CCNC: 15 U/L (ref 10–40)
AST SERPL-CCNC: 20 U/L (ref 10–40)
AST SERPL-CCNC: 23 U/L (ref 10–40)
AST SERPL-CCNC: 25 U/L (ref 10–40)
AST SERPL-CCNC: 27 U/L (ref 10–40)
AV INDEX (PROSTH): 0.5
AV MEAN GRADIENT: 13 MMHG
AV PEAK GRADIENT: 23 MMHG
AV VALVE AREA: 1.61 CM2
AV VELOCITY RATIO: 0.46
BASOPHILS # BLD AUTO: 0.01 K/UL (ref 0–0.2)
BASOPHILS # BLD AUTO: 0.02 K/UL (ref 0–0.2)
BASOPHILS # BLD AUTO: 0.03 K/UL (ref 0–0.2)
BASOPHILS NFR BLD: 0.1 % (ref 0–1.9)
BASOPHILS NFR BLD: 0.2 % (ref 0–1.9)
BASOPHILS NFR BLD: 0.2 % (ref 0–1.9)
BASOPHILS NFR BLD: 0.3 % (ref 0–1.9)
BASOPHILS NFR BLD: 0.4 % (ref 0–1.9)
BILIRUB SERPL-MCNC: 0.9 MG/DL (ref 0.1–1)
BILIRUB SERPL-MCNC: 0.9 MG/DL (ref 0.1–1)
BILIRUB SERPL-MCNC: 1.1 MG/DL (ref 0.1–1)
BILIRUB SERPL-MCNC: 1.3 MG/DL (ref 0.1–1)
BILIRUB SERPL-MCNC: 1.3 MG/DL (ref 0.1–1)
BILIRUB UR QL STRIP: NEGATIVE
BNP SERPL-MCNC: 110 PG/ML (ref 0–99)
BNP SERPL-MCNC: 110 PG/ML (ref 0–99)
BNP SERPL-MCNC: 181 PG/ML (ref 0–99)
BSA FOR ECHO PROCEDURE: 2.24 M2
BUN SERPL-MCNC: 11 MG/DL (ref 8–23)
BUN SERPL-MCNC: 11 MG/DL (ref 8–23)
BUN SERPL-MCNC: 13 MG/DL (ref 8–23)
BUN SERPL-MCNC: 14 MG/DL (ref 8–23)
BUN SERPL-MCNC: 14 MG/DL (ref 8–23)
BUN SERPL-MCNC: 16 MG/DL (ref 8–23)
CALCIUM SERPL-MCNC: 8.9 MG/DL (ref 8.7–10.5)
CALCIUM SERPL-MCNC: 8.9 MG/DL (ref 8.7–10.5)
CALCIUM SERPL-MCNC: 9.2 MG/DL (ref 8.7–10.5)
CALCIUM SERPL-MCNC: 9.3 MG/DL (ref 8.7–10.5)
CALCIUM SERPL-MCNC: 9.3 MG/DL (ref 8.7–10.5)
CALCIUM SERPL-MCNC: 9.7 MG/DL (ref 8.7–10.5)
CHLORIDE SERPL-SCNC: 86 MMOL/L (ref 95–110)
CHLORIDE SERPL-SCNC: 86 MMOL/L (ref 95–110)
CHLORIDE SERPL-SCNC: 89 MMOL/L (ref 95–110)
CHLORIDE SERPL-SCNC: 89 MMOL/L (ref 95–110)
CHLORIDE SERPL-SCNC: 90 MMOL/L (ref 95–110)
CHLORIDE SERPL-SCNC: 92 MMOL/L (ref 95–110)
CLARITY UR: CLEAR
CO2 SERPL-SCNC: 23 MMOL/L (ref 23–29)
CO2 SERPL-SCNC: 24 MMOL/L (ref 23–29)
CO2 SERPL-SCNC: 28 MMOL/L (ref 23–29)
CO2 SERPL-SCNC: 29 MMOL/L (ref 23–29)
CO2 SERPL-SCNC: 29 MMOL/L (ref 23–29)
CO2 SERPL-SCNC: 31 MMOL/L (ref 23–29)
COLOR UR: COLORLESS
CREAT SERPL-MCNC: 0.7 MG/DL (ref 0.5–1.4)
CREAT SERPL-MCNC: 0.7 MG/DL (ref 0.5–1.4)
CREAT SERPL-MCNC: 0.8 MG/DL (ref 0.5–1.4)
CREAT SERPL-MCNC: 0.9 MG/DL (ref 0.5–1.4)
CTP QC/QA: YES
CTP QC/QA: YES
CV ECHO LV RWT: 0.53 CM
CV STRESS BASE HR: 78 BPM
DIASTOLIC BLOOD PRESSURE: 79 MMHG
DIFFERENTIAL METHOD: ABNORMAL
DOP CALC AO PEAK VEL: 2.42 M/S
DOP CALC AO VTI: 49.1 CM
DOP CALC LVOT AREA: 3.2 CM2
DOP CALC LVOT DIAMETER: 2.02 CM
DOP CALC LVOT PEAK VEL: 1.12 M/S
DOP CALC LVOT STROKE VOLUME: 79.12 CM3
DOP CALC MV VTI: 43.5 CM
DOP CALCLVOT PEAK VEL VTI: 24.7 CM
E WAVE DECELERATION TIME: 233.11 MSEC
E/A RATIO: 1.1
E/E' RATIO: 12.71 M/S
ECHO LV POSTERIOR WALL: 1.38 CM (ref 0.6–1.1)
EJECTION FRACTION: 65 %
EOSINOPHIL # BLD AUTO: 0 K/UL (ref 0–0.5)
EOSINOPHIL # BLD AUTO: 0.1 K/UL (ref 0–0.5)
EOSINOPHIL # BLD AUTO: 0.1 K/UL (ref 0–0.5)
EOSINOPHIL NFR BLD: 0.1 % (ref 0–8)
EOSINOPHIL NFR BLD: 0.3 % (ref 0–8)
EOSINOPHIL NFR BLD: 0.4 % (ref 0–8)
EOSINOPHIL NFR BLD: 1 % (ref 0–8)
EOSINOPHIL NFR BLD: 1.3 % (ref 0–8)
ERYTHROCYTE [DISTWIDTH] IN BLOOD BY AUTOMATED COUNT: 13.6 % (ref 11.5–14.5)
ERYTHROCYTE [DISTWIDTH] IN BLOOD BY AUTOMATED COUNT: 13.7 % (ref 11.5–14.5)
ERYTHROCYTE [DISTWIDTH] IN BLOOD BY AUTOMATED COUNT: 13.9 % (ref 11.5–14.5)
ERYTHROCYTE [DISTWIDTH] IN BLOOD BY AUTOMATED COUNT: 13.9 % (ref 11.5–14.5)
ERYTHROCYTE [DISTWIDTH] IN BLOOD BY AUTOMATED COUNT: 14 % (ref 11.5–14.5)
EST. GFR  (NO RACE VARIABLE): >60 ML/MIN/1.73 M^2
EXTRA BLUE TOP RAINBOW: NORMAL
FERRITIN SERPL-MCNC: 341 NG/ML (ref 20–300)
FRACTIONAL SHORTENING: 37 % (ref 28–44)
GLUCOSE SERPL-MCNC: 111 MG/DL (ref 70–110)
GLUCOSE SERPL-MCNC: 114 MG/DL (ref 70–110)
GLUCOSE SERPL-MCNC: 127 MG/DL (ref 70–110)
GLUCOSE SERPL-MCNC: 142 MG/DL (ref 70–110)
GLUCOSE SERPL-MCNC: 208 MG/DL (ref 70–110)
GLUCOSE SERPL-MCNC: 98 MG/DL (ref 70–110)
GLUCOSE UR QL STRIP: NEGATIVE
HAV IGM SERPL QL IA: NORMAL
HBV CORE IGM SERPL QL IA: NORMAL
HBV SURFACE AG SERPL QL IA: NORMAL
HCT VFR BLD AUTO: 38.6 % (ref 40–54)
HCT VFR BLD AUTO: 38.7 % (ref 40–54)
HCT VFR BLD AUTO: 39.2 % (ref 40–54)
HCT VFR BLD AUTO: 39.3 % (ref 40–54)
HCT VFR BLD AUTO: 42.9 % (ref 40–54)
HCV AB SERPL QL IA: NORMAL
HGB BLD-MCNC: 13.1 G/DL (ref 14–18)
HGB BLD-MCNC: 13.2 G/DL (ref 14–18)
HGB BLD-MCNC: 13.3 G/DL (ref 14–18)
HGB BLD-MCNC: 13.4 G/DL (ref 14–18)
HGB BLD-MCNC: 14.4 G/DL (ref 14–18)
HGB UR QL STRIP: NEGATIVE
IMM GRANULOCYTES # BLD AUTO: 0.04 K/UL (ref 0–0.04)
IMM GRANULOCYTES # BLD AUTO: 0.07 K/UL (ref 0–0.04)
IMM GRANULOCYTES # BLD AUTO: 0.09 K/UL (ref 0–0.04)
IMM GRANULOCYTES # BLD AUTO: 0.11 K/UL (ref 0–0.04)
IMM GRANULOCYTES # BLD AUTO: 0.11 K/UL (ref 0–0.04)
IMM GRANULOCYTES NFR BLD AUTO: 0.5 % (ref 0–0.5)
IMM GRANULOCYTES NFR BLD AUTO: 0.7 % (ref 0–0.5)
IMM GRANULOCYTES NFR BLD AUTO: 0.7 % (ref 0–0.5)
IMM GRANULOCYTES NFR BLD AUTO: 1.2 % (ref 0–0.5)
IMM GRANULOCYTES NFR BLD AUTO: 1.3 % (ref 0–0.5)
INR PPP: 1.1 (ref 0.8–1.2)
INTERVENTRICULAR SEPTUM: 1.02 CM (ref 0.6–1.1)
IRON SERPL-MCNC: 20 UG/DL (ref 45–160)
IVRT: 58.99 MSEC
KETONES UR QL STRIP: NEGATIVE
LA MAJOR: 6.16 CM
LA MINOR: 6.24 CM
LEFT ATRIUM SIZE: 4.26 CM
LEFT ATRIUM VOLUME INDEX MOD: 32.5 ML/M2
LEFT ATRIUM VOLUME MOD: 71.4 CM3
LEFT INTERNAL DIMENSION IN SYSTOLE: 3.28 CM (ref 2.1–4)
LEFT VENTRICLE DIASTOLIC VOLUME INDEX: 59.97 ML/M2
LEFT VENTRICLE DIASTOLIC VOLUME: 131.93 ML
LEFT VENTRICLE MASS INDEX: 115 G/M2
LEFT VENTRICLE SYSTOLIC VOLUME INDEX: 19.8 ML/M2
LEFT VENTRICLE SYSTOLIC VOLUME: 43.59 ML
LEFT VENTRICULAR INTERNAL DIMENSION IN DIASTOLE: 5.24 CM (ref 3.5–6)
LEFT VENTRICULAR MASS: 251.92 G
LEUKOCYTE ESTERASE UR QL STRIP: NEGATIVE
LV LATERAL E/E' RATIO: 15.43 M/S
LV SEPTAL E/E' RATIO: 10.8 M/S
LVOT MG: 3.1 MMHG
LVOT MV: 0.86 CM/S
LYMPHOCYTES # BLD AUTO: 0.5 K/UL (ref 1–4.8)
LYMPHOCYTES # BLD AUTO: 0.8 K/UL (ref 1–4.8)
LYMPHOCYTES # BLD AUTO: 0.9 K/UL (ref 1–4.8)
LYMPHOCYTES # BLD AUTO: 0.9 K/UL (ref 1–4.8)
LYMPHOCYTES # BLD AUTO: 1.3 K/UL (ref 1–4.8)
LYMPHOCYTES NFR BLD: 11 % (ref 18–48)
LYMPHOCYTES NFR BLD: 17 % (ref 18–48)
LYMPHOCYTES NFR BLD: 4.3 % (ref 18–48)
LYMPHOCYTES NFR BLD: 7.8 % (ref 18–48)
LYMPHOCYTES NFR BLD: 9.1 % (ref 18–48)
MAGNESIUM SERPL-MCNC: 1.8 MG/DL (ref 1.6–2.6)
MAGNESIUM SERPL-MCNC: 2 MG/DL (ref 1.6–2.6)
MCH RBC QN AUTO: 29.4 PG (ref 27–31)
MCH RBC QN AUTO: 29.6 PG (ref 27–31)
MCH RBC QN AUTO: 29.6 PG (ref 27–31)
MCH RBC QN AUTO: 29.7 PG (ref 27–31)
MCH RBC QN AUTO: 30.4 PG (ref 27–31)
MCHC RBC AUTO-ENTMCNC: 33.4 G/DL (ref 32–36)
MCHC RBC AUTO-ENTMCNC: 33.6 G/DL (ref 32–36)
MCHC RBC AUTO-ENTMCNC: 33.8 G/DL (ref 32–36)
MCHC RBC AUTO-ENTMCNC: 34.1 G/DL (ref 32–36)
MCHC RBC AUTO-ENTMCNC: 34.7 G/DL (ref 32–36)
MCV RBC AUTO: 87 FL (ref 82–98)
MCV RBC AUTO: 87 FL (ref 82–98)
MCV RBC AUTO: 88 FL (ref 82–98)
MCV RBC AUTO: 88 FL (ref 82–98)
MCV RBC AUTO: 89 FL (ref 82–98)
MONOCYTES # BLD AUTO: 1.2 K/UL (ref 0.3–1)
MONOCYTES # BLD AUTO: 1.4 K/UL (ref 0.3–1)
MONOCYTES # BLD AUTO: 1.7 K/UL (ref 0.3–1)
MONOCYTES NFR BLD: 11.3 % (ref 4–15)
MONOCYTES NFR BLD: 13.4 % (ref 4–15)
MONOCYTES NFR BLD: 14.7 % (ref 4–15)
MONOCYTES NFR BLD: 16 % (ref 4–15)
MONOCYTES NFR BLD: 20.1 % (ref 4–15)
MV MEAN GRADIENT: 3 MMHG
MV PEAK A VEL: 0.98 M/S
MV PEAK E VEL: 1.08 M/S
MV PEAK GRADIENT: 6 MMHG
MV STENOSIS PRESSURE HALF TIME: 93.03 MS
MV VALVE AREA BY CONTINUITY EQUATION: 1.82 CM2
MV VALVE AREA P 1/2 METHOD: 2.36 CM2
NEUTROPHILS # BLD AUTO: 10.3 K/UL (ref 1.8–7.7)
NEUTROPHILS # BLD AUTO: 4.9 K/UL (ref 1.8–7.7)
NEUTROPHILS # BLD AUTO: 5.4 K/UL (ref 1.8–7.7)
NEUTROPHILS # BLD AUTO: 7.1 K/UL (ref 1.8–7.7)
NEUTROPHILS # BLD AUTO: 7.8 K/UL (ref 1.8–7.7)
NEUTROPHILS NFR BLD: 64.9 % (ref 38–73)
NEUTROPHILS NFR BLD: 66.2 % (ref 38–73)
NEUTROPHILS NFR BLD: 74.4 % (ref 38–73)
NEUTROPHILS NFR BLD: 77.7 % (ref 38–73)
NEUTROPHILS NFR BLD: 83.4 % (ref 38–73)
NITRITE UR QL STRIP: NEGATIVE
NRBC BLD-RTO: 0 /100 WBC
OHS CV CPX 85 PERCENT MAX PREDICTED HEART RATE MALE: 117
OHS CV CPX MAX PREDICTED HEART RATE: 138
OHS CV CPX PATIENT IS FEMALE: 0
OHS CV CPX PATIENT IS MALE: 1
OHS CV CPX PEAK DIASTOLIC BLOOD PRESSURE: 77 MMHG
OHS CV CPX PEAK HEAR RATE: 91 BPM
OHS CV CPX PEAK RATE PRESSURE PRODUCT: NORMAL
OHS CV CPX PEAK SYSTOLIC BLOOD PRESSURE: 175 MMHG
OHS CV CPX PERCENT MAX PREDICTED HEART RATE ACHIEVED: 66
OHS CV CPX RATE PRESSURE PRODUCT PRESENTING: NORMAL
OHS LV EJECTION FRACTION SIMPSONS BIPLANE MOD: 7 %
OSMOLALITY UR: 266 MOSM/KG (ref 50–1200)
PH UR STRIP: 7 [PH] (ref 5–8)
PHOSPHATE SERPL-MCNC: 3.2 MG/DL (ref 2.7–4.5)
PHOSPHATE SERPL-MCNC: 3.8 MG/DL (ref 2.7–4.5)
PISA TR MAX VEL: 3.28 M/S
PLATELET # BLD AUTO: 270 K/UL (ref 150–450)
PLATELET # BLD AUTO: 285 K/UL (ref 150–450)
PLATELET # BLD AUTO: 339 K/UL (ref 150–450)
PLATELET # BLD AUTO: 427 K/UL (ref 150–450)
PLATELET # BLD AUTO: 429 K/UL (ref 150–450)
PMV BLD AUTO: 9.5 FL (ref 9.2–12.9)
PMV BLD AUTO: 9.6 FL (ref 9.2–12.9)
PMV BLD AUTO: 9.7 FL (ref 9.2–12.9)
PMV BLD AUTO: 9.7 FL (ref 9.2–12.9)
PMV BLD AUTO: 9.9 FL (ref 9.2–12.9)
POC MOLECULAR INFLUENZA A AGN: NEGATIVE
POC MOLECULAR INFLUENZA B AGN: NEGATIVE
POTASSIUM SERPL-SCNC: 3.8 MMOL/L (ref 3.5–5.1)
POTASSIUM SERPL-SCNC: 4.1 MMOL/L (ref 3.5–5.1)
POTASSIUM SERPL-SCNC: 4.2 MMOL/L (ref 3.5–5.1)
POTASSIUM SERPL-SCNC: 4.2 MMOL/L (ref 3.5–5.1)
POTASSIUM SERPL-SCNC: 5.1 MMOL/L (ref 3.5–5.1)
POTASSIUM SERPL-SCNC: 5.2 MMOL/L (ref 3.5–5.1)
PROT SERPL-MCNC: 6.6 G/DL (ref 6–8.4)
PROT SERPL-MCNC: 6.8 G/DL (ref 6–8.4)
PROT SERPL-MCNC: 6.9 G/DL (ref 6–8.4)
PROT SERPL-MCNC: 7 G/DL (ref 6–8.4)
PROT SERPL-MCNC: 7.5 G/DL (ref 6–8.4)
PROT UR QL STRIP: NEGATIVE
PROTHROMBIN TIME: 12.1 SEC (ref 9–12.5)
RA MAJOR: 5.27 CM
RA PRESSURE: 8 MMHG
RBC # BLD AUTO: 4.41 M/UL (ref 4.6–6.2)
RBC # BLD AUTO: 4.43 M/UL (ref 4.6–6.2)
RBC # BLD AUTO: 4.44 M/UL (ref 4.6–6.2)
RBC # BLD AUTO: 4.5 M/UL (ref 4.6–6.2)
RBC # BLD AUTO: 4.9 M/UL (ref 4.6–6.2)
RIGHT VENTRICULAR END-DIASTOLIC DIMENSION: 3.82 CM
RV TISSUE DOPPLER FREE WALL SYSTOLIC VELOCITY 1 (APICAL 4 CHAMBER VIEW): 0.01 CM/S
SARS-COV-2 RDRP RESP QL NAA+PROBE: NEGATIVE
SATURATED IRON: 7 % (ref 20–50)
SODIUM SERPL-SCNC: 121 MMOL/L (ref 136–145)
SODIUM SERPL-SCNC: 123 MMOL/L (ref 136–145)
SODIUM SERPL-SCNC: 124 MMOL/L (ref 136–145)
SODIUM SERPL-SCNC: 125 MMOL/L (ref 136–145)
SODIUM SERPL-SCNC: 128 MMOL/L (ref 136–145)
SODIUM SERPL-SCNC: 128 MMOL/L (ref 136–145)
SODIUM UR-SCNC: 73 MMOL/L (ref 20–250)
SP GR UR STRIP: 1 (ref 1–1.03)
STJ: 2.81 CM
SYSTOLIC BLOOD PRESSURE: 165 MMHG
TDI LATERAL: 0.07 M/S
TDI SEPTAL: 0.1 M/S
TDI: 0.09 M/S
TOTAL IRON BINDING CAPACITY: 281 UG/DL (ref 250–450)
TR MAX PG: 43 MMHG
TRANSFERRIN SERPL-MCNC: 190 MG/DL (ref 200–375)
TROPONIN I SERPL DL<=0.01 NG/ML-MCNC: 0.01 NG/ML (ref 0–0.03)
TROPONIN I SERPL DL<=0.01 NG/ML-MCNC: 0.01 NG/ML (ref 0–0.03)
TROPONIN I SERPL DL<=0.01 NG/ML-MCNC: <0.006 NG/ML (ref 0–0.03)
TROPONIN I SERPL DL<=0.01 NG/ML-MCNC: <0.006 NG/ML (ref 0–0.03)
TSH SERPL DL<=0.005 MIU/L-ACNC: 0.58 UIU/ML (ref 0.4–4)
TV REST PULMONARY ARTERY PRESSURE: 51 MMHG
URN SPEC COLLECT METH UR: ABNORMAL
UROBILINOGEN UR STRIP-ACNC: NEGATIVE EU/DL
WBC # BLD AUTO: 10.09 K/UL (ref 3.9–12.7)
WBC # BLD AUTO: 12.32 K/UL (ref 3.9–12.7)
WBC # BLD AUTO: 7.58 K/UL (ref 3.9–12.7)
WBC # BLD AUTO: 8.21 K/UL (ref 3.9–12.7)
WBC # BLD AUTO: 9.48 K/UL (ref 3.9–12.7)

## 2023-01-01 PROCEDURE — 94640 AIRWAY INHALATION TREATMENT: CPT

## 2023-01-01 PROCEDURE — 36415 COLL VENOUS BLD VENIPUNCTURE: CPT

## 2023-01-01 PROCEDURE — 80053 COMPREHEN METABOLIC PANEL: CPT

## 2023-01-01 PROCEDURE — 84466 ASSAY OF TRANSFERRIN: CPT

## 2023-01-01 PROCEDURE — 84100 ASSAY OF PHOSPHORUS: CPT

## 2023-01-01 PROCEDURE — 3078F PR MOST RECENT DIASTOLIC BLOOD PRESSURE < 80 MM HG: ICD-10-PCS | Mod: CPTII,S$GLB,, | Performed by: INTERNAL MEDICINE

## 2023-01-01 PROCEDURE — 85025 COMPLETE CBC W/AUTO DIFF WBC: CPT

## 2023-01-01 PROCEDURE — 99900035 HC TECH TIME PER 15 MIN (STAT)

## 2023-01-01 PROCEDURE — 96372 THER/PROPH/DIAG INJ SC/IM: CPT | Mod: 59

## 2023-01-01 PROCEDURE — 93005 ELECTROCARDIOGRAM TRACING: CPT

## 2023-01-01 PROCEDURE — 83880 ASSAY OF NATRIURETIC PEPTIDE: CPT | Performed by: EMERGENCY MEDICINE

## 2023-01-01 PROCEDURE — 99215 OFFICE O/P EST HI 40 MIN: CPT | Mod: S$GLB,,, | Performed by: INTERNAL MEDICINE

## 2023-01-01 PROCEDURE — 1101F PR PT FALLS ASSESS DOC 0-1 FALLS W/OUT INJ PAST YR: ICD-10-PCS | Mod: CPTII,S$GLB,, | Performed by: INTERNAL MEDICINE

## 2023-01-01 PROCEDURE — 83880 ASSAY OF NATRIURETIC PEPTIDE: CPT | Performed by: NURSE PRACTITIONER

## 2023-01-01 PROCEDURE — 25000003 PHARM REV CODE 250: Performed by: STUDENT IN AN ORGANIZED HEALTH CARE EDUCATION/TRAINING PROGRAM

## 2023-01-01 PROCEDURE — 96374 THER/PROPH/DIAG INJ IV PUSH: CPT

## 2023-01-01 PROCEDURE — 63600175 PHARM REV CODE 636 W HCPCS: Performed by: STUDENT IN AN ORGANIZED HEALTH CARE EDUCATION/TRAINING PROGRAM

## 2023-01-01 PROCEDURE — 94761 N-INVAS EAR/PLS OXIMETRY MLT: CPT

## 2023-01-01 PROCEDURE — 93451 RIGHT HEART CATH: CPT | Mod: 26,,, | Performed by: INTERNAL MEDICINE

## 2023-01-01 PROCEDURE — C1894 INTRO/SHEATH, NON-LASER: HCPCS | Performed by: INTERNAL MEDICINE

## 2023-01-01 PROCEDURE — 63600175 PHARM REV CODE 636 W HCPCS: Performed by: INTERNAL MEDICINE

## 2023-01-01 PROCEDURE — G0378 HOSPITAL OBSERVATION PER HR: HCPCS

## 2023-01-01 PROCEDURE — 84484 ASSAY OF TROPONIN QUANT: CPT | Mod: 91

## 2023-01-01 PROCEDURE — 93017 CV STRESS TEST TRACING ONLY: CPT

## 2023-01-01 PROCEDURE — 83935 ASSAY OF URINE OSMOLALITY: CPT

## 2023-01-01 PROCEDURE — A9502 TC99M TETROFOSMIN: HCPCS

## 2023-01-01 PROCEDURE — 63600175 PHARM REV CODE 636 W HCPCS: Performed by: EMERGENCY MEDICINE

## 2023-01-01 PROCEDURE — 80053 COMPREHEN METABOLIC PANEL: CPT | Performed by: NURSE PRACTITIONER

## 2023-01-01 PROCEDURE — 84300 ASSAY OF URINE SODIUM: CPT

## 2023-01-01 PROCEDURE — 1126F PR PAIN SEVERITY QUANTIFIED, NO PAIN PRESENT: ICD-10-PCS | Mod: CPTII,S$GLB,, | Performed by: INTERNAL MEDICINE

## 2023-01-01 PROCEDURE — 27000221 HC OXYGEN, UP TO 24 HOURS

## 2023-01-01 PROCEDURE — 99999 PR PBB SHADOW E&M-EST. PATIENT-LVL III: CPT | Mod: PBBFAC,,, | Performed by: INTERNAL MEDICINE

## 2023-01-01 PROCEDURE — 78452 HT MUSCLE IMAGE SPECT MULT: CPT | Mod: 26,,, | Performed by: RADIOLOGY

## 2023-01-01 PROCEDURE — 25000242 PHARM REV CODE 250 ALT 637 W/ HCPCS

## 2023-01-01 PROCEDURE — 25000242 PHARM REV CODE 250 ALT 637 W/ HCPCS: Performed by: STUDENT IN AN ORGANIZED HEALTH CARE EDUCATION/TRAINING PROGRAM

## 2023-01-01 PROCEDURE — 93010 ELECTROCARDIOGRAM REPORT: CPT | Mod: ,,, | Performed by: INTERNAL MEDICINE

## 2023-01-01 PROCEDURE — 1159F PR MEDICATION LIST DOCUMENTED IN MEDICAL RECORD: ICD-10-PCS | Mod: CPTII,S$GLB,, | Performed by: INTERNAL MEDICINE

## 2023-01-01 PROCEDURE — 3078F DIAST BP <80 MM HG: CPT | Mod: CPTII,S$GLB,, | Performed by: INTERNAL MEDICINE

## 2023-01-01 PROCEDURE — 84484 ASSAY OF TROPONIN QUANT: CPT | Performed by: EMERGENCY MEDICINE

## 2023-01-01 PROCEDURE — 1101F PT FALLS ASSESS-DOCD LE1/YR: CPT | Mod: CPTII,S$GLB,, | Performed by: INTERNAL MEDICINE

## 2023-01-01 PROCEDURE — 87502 INFLUENZA DNA AMP PROBE: CPT

## 2023-01-01 PROCEDURE — 25000242 PHARM REV CODE 250 ALT 637 W/ HCPCS: Performed by: INTERNAL MEDICINE

## 2023-01-01 PROCEDURE — 93451 RIGHT HEART CATH: CPT | Performed by: INTERNAL MEDICINE

## 2023-01-01 PROCEDURE — 99999 PR PBB SHADOW E&M-EST. PATIENT-LVL IV: ICD-10-PCS | Mod: PBBFAC,,, | Performed by: INTERNAL MEDICINE

## 2023-01-01 PROCEDURE — 99285 EMERGENCY DEPT VISIT HI MDM: CPT | Mod: 25

## 2023-01-01 PROCEDURE — 3077F PR MOST RECENT SYSTOLIC BLOOD PRESSURE >= 140 MM HG: ICD-10-PCS | Mod: CPTII,S$GLB,, | Performed by: INTERNAL MEDICINE

## 2023-01-01 PROCEDURE — 1160F PR REVIEW ALL MEDS BY PRESCRIBER/CLIN PHARMACIST DOCUMENTED: ICD-10-PCS | Mod: CPTII,S$GLB,, | Performed by: INTERNAL MEDICINE

## 2023-01-01 PROCEDURE — 3077F SYST BP >= 140 MM HG: CPT | Mod: CPTII,S$GLB,, | Performed by: INTERNAL MEDICINE

## 2023-01-01 PROCEDURE — 3288F PR FALLS RISK ASSESSMENT DOCUMENTED: ICD-10-PCS | Mod: CPTII,S$GLB,, | Performed by: INTERNAL MEDICINE

## 2023-01-01 PROCEDURE — 93010 EKG 12-LEAD: ICD-10-PCS | Mod: ,,, | Performed by: INTERNAL MEDICINE

## 2023-01-01 PROCEDURE — 3288F FALL RISK ASSESSMENT DOCD: CPT | Mod: CPTII,S$GLB,, | Performed by: INTERNAL MEDICINE

## 2023-01-01 PROCEDURE — 82728 ASSAY OF FERRITIN: CPT

## 2023-01-01 PROCEDURE — 81003 URINALYSIS AUTO W/O SCOPE: CPT

## 2023-01-01 PROCEDURE — 93018 NUCLEAR STRESS TEST (CUPID ONLY): ICD-10-PCS | Mod: ,,, | Performed by: INTERNAL MEDICINE

## 2023-01-01 PROCEDURE — 93016 NUCLEAR STRESS TEST (CUPID ONLY): ICD-10-PCS | Mod: ,,, | Performed by: INTERNAL MEDICINE

## 2023-01-01 PROCEDURE — 84443 ASSAY THYROID STIM HORMONE: CPT

## 2023-01-01 PROCEDURE — 1159F MED LIST DOCD IN RCRD: CPT | Mod: CPTII,S$GLB,, | Performed by: INTERNAL MEDICINE

## 2023-01-01 PROCEDURE — 99204 PR OFFICE/OUTPT VISIT, NEW, LEVL IV, 45-59 MIN: ICD-10-PCS | Mod: S$GLB,,, | Performed by: INTERNAL MEDICINE

## 2023-01-01 PROCEDURE — 99215 PR OFFICE/OUTPT VISIT, EST, LEVL V, 40-54 MIN: ICD-10-PCS | Mod: S$GLB,,, | Performed by: INTERNAL MEDICINE

## 2023-01-01 PROCEDURE — 78452 NM MYOCARDIAL PERFUSION SPECT MULTI PHARM: ICD-10-PCS | Mod: 26,,, | Performed by: RADIOLOGY

## 2023-01-01 PROCEDURE — 84484 ASSAY OF TROPONIN QUANT: CPT | Performed by: NURSE PRACTITIONER

## 2023-01-01 PROCEDURE — 63600175 PHARM REV CODE 636 W HCPCS

## 2023-01-01 PROCEDURE — 1126F AMNT PAIN NOTED NONE PRSNT: CPT | Mod: CPTII,S$GLB,, | Performed by: INTERNAL MEDICINE

## 2023-01-01 PROCEDURE — 80074 ACUTE HEPATITIS PANEL: CPT

## 2023-01-01 PROCEDURE — 85610 PROTHROMBIN TIME: CPT

## 2023-01-01 PROCEDURE — 93016 CV STRESS TEST SUPVJ ONLY: CPT | Mod: ,,, | Performed by: INTERNAL MEDICINE

## 2023-01-01 PROCEDURE — 85025 COMPLETE CBC W/AUTO DIFF WBC: CPT | Performed by: EMERGENCY MEDICINE

## 2023-01-01 PROCEDURE — C1751 CATH, INF, PER/CENT/MIDLINE: HCPCS | Performed by: INTERNAL MEDICINE

## 2023-01-01 PROCEDURE — 85025 COMPLETE CBC W/AUTO DIFF WBC: CPT | Performed by: NURSE PRACTITIONER

## 2023-01-01 PROCEDURE — 25000003 PHARM REV CODE 250: Performed by: INTERNAL MEDICINE

## 2023-01-01 PROCEDURE — 99999 PR PBB SHADOW E&M-EST. PATIENT-LVL III: ICD-10-PCS | Mod: PBBFAC,,, | Performed by: INTERNAL MEDICINE

## 2023-01-01 PROCEDURE — 83735 ASSAY OF MAGNESIUM: CPT | Performed by: EMERGENCY MEDICINE

## 2023-01-01 PROCEDURE — 25000003 PHARM REV CODE 250

## 2023-01-01 PROCEDURE — 99204 OFFICE O/P NEW MOD 45 MIN: CPT | Mod: S$GLB,,, | Performed by: INTERNAL MEDICINE

## 2023-01-01 PROCEDURE — 11000001 HC ACUTE MED/SURG PRIVATE ROOM

## 2023-01-01 PROCEDURE — 99999 PR PBB SHADOW E&M-EST. PATIENT-LVL IV: CPT | Mod: PBBFAC,,, | Performed by: INTERNAL MEDICINE

## 2023-01-01 PROCEDURE — 87635 SARS-COV-2 COVID-19 AMP PRB: CPT | Performed by: EMERGENCY MEDICINE

## 2023-01-01 PROCEDURE — 99291 CRITICAL CARE FIRST HOUR: CPT

## 2023-01-01 PROCEDURE — 78452 HT MUSCLE IMAGE SPECT MULT: CPT | Mod: TC

## 2023-01-01 PROCEDURE — 25000003 PHARM REV CODE 250: Performed by: EMERGENCY MEDICINE

## 2023-01-01 PROCEDURE — 1160F RVW MEDS BY RX/DR IN RCRD: CPT | Mod: CPTII,S$GLB,, | Performed by: INTERNAL MEDICINE

## 2023-01-01 PROCEDURE — 93018 CV STRESS TEST I&R ONLY: CPT | Mod: ,,, | Performed by: INTERNAL MEDICINE

## 2023-01-01 PROCEDURE — 80053 COMPREHEN METABOLIC PANEL: CPT | Performed by: EMERGENCY MEDICINE

## 2023-01-01 PROCEDURE — 25000242 PHARM REV CODE 250 ALT 637 W/ HCPCS: Performed by: EMERGENCY MEDICINE

## 2023-01-01 PROCEDURE — 80048 BASIC METABOLIC PNL TOTAL CA: CPT | Mod: XB

## 2023-01-01 PROCEDURE — 96375 TX/PRO/DX INJ NEW DRUG ADDON: CPT

## 2023-01-01 PROCEDURE — 93451 PR RIGHT HEART CATH O2 SATURATION & CARDIAC OUTPUT: ICD-10-PCS | Mod: 26,,, | Performed by: INTERNAL MEDICINE

## 2023-01-01 PROCEDURE — 83735 ASSAY OF MAGNESIUM: CPT

## 2023-01-01 RX ORDER — FUROSEMIDE 10 MG/ML
80 INJECTION INTRAMUSCULAR; INTRAVENOUS ONCE
Status: DISCONTINUED | OUTPATIENT
Start: 2023-01-01 | End: 2023-01-01

## 2023-01-01 RX ORDER — CETIRIZINE HYDROCHLORIDE 10 MG/1
10 TABLET ORAL DAILY
COMMUNITY

## 2023-01-01 RX ORDER — IPRATROPIUM BROMIDE 0.5 MG/2.5ML
0.5 SOLUTION RESPIRATORY (INHALATION) EVERY 4 HOURS
Status: DISCONTINUED | OUTPATIENT
Start: 2023-01-01 | End: 2023-01-01

## 2023-01-01 RX ORDER — DIPHENHYDRAMINE HCL 25 MG
25 CAPSULE ORAL NIGHTLY PRN
Status: DISCONTINUED | OUTPATIENT
Start: 2023-01-01 | End: 2023-01-01 | Stop reason: HOSPADM

## 2023-01-01 RX ORDER — ENOXAPARIN SODIUM 100 MG/ML
40 INJECTION SUBCUTANEOUS EVERY 24 HOURS
Status: DISCONTINUED | OUTPATIENT
Start: 2023-01-01 | End: 2023-01-01 | Stop reason: HOSPADM

## 2023-01-01 RX ORDER — BUMETANIDE 1 MG/1
1 TABLET ORAL DAILY
Qty: 90 TABLET | Refills: 3 | Status: ON HOLD | OUTPATIENT
Start: 2023-01-01 | End: 2023-01-01 | Stop reason: SDUPTHER

## 2023-01-01 RX ORDER — LEVALBUTEROL INHALATION SOLUTION 0.63 MG/3ML
0.63 SOLUTION RESPIRATORY (INHALATION) EVERY 4 HOURS
Status: DISCONTINUED | OUTPATIENT
Start: 2023-01-01 | End: 2023-01-01

## 2023-01-01 RX ORDER — BUDESONIDE AND FORMOTEROL FUMARATE DIHYDRATE 160; 4.5 UG/1; UG/1
2 AEROSOL RESPIRATORY (INHALATION) EVERY 12 HOURS
Qty: 1 G | Refills: 3 | Status: SHIPPED | OUTPATIENT
Start: 2023-01-01 | End: 2023-01-01

## 2023-01-01 RX ORDER — PREDNISONE 20 MG/1
40 TABLET ORAL DAILY
Status: DISCONTINUED | OUTPATIENT
Start: 2023-01-01 | End: 2023-01-01 | Stop reason: HOSPADM

## 2023-01-01 RX ORDER — POTASSIUM CHLORIDE 20 MEQ/1
20 TABLET, EXTENDED RELEASE ORAL DAILY
Status: DISCONTINUED | OUTPATIENT
Start: 2023-01-01 | End: 2023-01-01 | Stop reason: HOSPADM

## 2023-01-01 RX ORDER — DIPHENHYDRAMINE HCL 25 MG
25 CAPSULE ORAL NIGHTLY PRN
Status: ON HOLD | COMMUNITY
End: 2023-01-01 | Stop reason: HOSPADM

## 2023-01-01 RX ORDER — FUROSEMIDE 10 MG/ML
80 INJECTION INTRAMUSCULAR; INTRAVENOUS ONCE
Status: COMPLETED | OUTPATIENT
Start: 2023-01-01 | End: 2023-01-01

## 2023-01-01 RX ORDER — VALSARTAN 160 MG/1
320 TABLET ORAL DAILY
Status: DISCONTINUED | OUTPATIENT
Start: 2023-01-01 | End: 2023-01-01 | Stop reason: HOSPADM

## 2023-01-01 RX ORDER — PREDNISONE 20 MG/1
40 TABLET ORAL DAILY
Qty: 6 TABLET | Refills: 0 | Status: SHIPPED | OUTPATIENT
Start: 2023-01-01 | End: 2023-01-01

## 2023-01-01 RX ORDER — MULTIVIT-MIN/FOLIC/VIT K/LYCOP 400-20-370
1 TABLET ORAL DAILY
COMMUNITY

## 2023-01-01 RX ORDER — VALSARTAN 160 MG/1
320 TABLET ORAL DAILY
Status: DISCONTINUED | OUTPATIENT
Start: 2023-01-01 | End: 2023-01-01

## 2023-01-01 RX ORDER — IPRATROPIUM BROMIDE 0.5 MG/2.5ML
0.5 SOLUTION RESPIRATORY (INHALATION) EVERY 4 HOURS
Status: COMPLETED | OUTPATIENT
Start: 2023-01-01 | End: 2023-01-01

## 2023-01-01 RX ORDER — SERINE 100 %
1 CRYSTALS MISCELLANEOUS DAILY
COMMUNITY

## 2023-01-01 RX ORDER — CETIRIZINE HYDROCHLORIDE 10 MG/1
10 TABLET ORAL DAILY
Status: DISCONTINUED | OUTPATIENT
Start: 2023-01-01 | End: 2023-01-01 | Stop reason: HOSPADM

## 2023-01-01 RX ORDER — LEVALBUTEROL INHALATION SOLUTION 0.63 MG/3ML
0.63 SOLUTION RESPIRATORY (INHALATION) EVERY 4 HOURS
Status: COMPLETED | OUTPATIENT
Start: 2023-01-01 | End: 2023-01-01

## 2023-01-01 RX ORDER — LIDOCAINE HYDROCHLORIDE 20 MG/ML
INJECTION, SOLUTION EPIDURAL; INFILTRATION; INTRACAUDAL; PERINEURAL
Status: DISCONTINUED | OUTPATIENT
Start: 2023-01-01 | End: 2023-01-01 | Stop reason: HOSPADM

## 2023-01-01 RX ORDER — FUROSEMIDE 10 MG/ML
80 INJECTION INTRAMUSCULAR; INTRAVENOUS
Status: COMPLETED | OUTPATIENT
Start: 2023-01-01 | End: 2023-01-01

## 2023-01-01 RX ORDER — CHOLECALCIFEROL (VITAMIN D3) 25 MCG
1000 TABLET ORAL DAILY
COMMUNITY

## 2023-01-01 RX ORDER — GUAIFENESIN/PHENYLPROPANOLAMIN
500 EXPECTORANT ORAL DAILY
COMMUNITY

## 2023-01-01 RX ORDER — FLUTICASONE FUROATE AND VILANTEROL 100; 25 UG/1; UG/1
1 POWDER RESPIRATORY (INHALATION) DAILY
Status: DISCONTINUED | OUTPATIENT
Start: 2023-01-01 | End: 2023-01-01 | Stop reason: HOSPADM

## 2023-01-01 RX ORDER — SODIUM CHLORIDE 0.9 % (FLUSH) 0.9 %
10 SYRINGE (ML) INJECTION
Status: DISCONTINUED | OUTPATIENT
Start: 2023-01-01 | End: 2023-01-01 | Stop reason: HOSPADM

## 2023-01-01 RX ORDER — ALBUTEROL SULFATE 90 UG/1
2 AEROSOL, METERED RESPIRATORY (INHALATION) EVERY 6 HOURS PRN
Status: DISCONTINUED | OUTPATIENT
Start: 2023-01-01 | End: 2023-01-01 | Stop reason: HOSPADM

## 2023-01-01 RX ORDER — BUMETANIDE 1 MG/1
2 TABLET ORAL 2 TIMES DAILY
Qty: 90 TABLET | Refills: 3 | Status: SHIPPED | OUTPATIENT
Start: 2023-01-01

## 2023-01-01 RX ORDER — LEVALBUTEROL 1.25 MG/.5ML
1.25 SOLUTION, CONCENTRATE RESPIRATORY (INHALATION)
Status: COMPLETED | OUTPATIENT
Start: 2023-01-01 | End: 2023-01-01

## 2023-01-01 RX ORDER — SODIUM CHLORIDE 9 MG/ML
INJECTION, SOLUTION INTRAVENOUS
Status: DISCONTINUED | OUTPATIENT
Start: 2023-01-01 | End: 2023-01-01 | Stop reason: HOSPADM

## 2023-01-01 RX ORDER — BUMETANIDE 0.25 MG/ML
2 INJECTION INTRAMUSCULAR; INTRAVENOUS
Status: COMPLETED | OUTPATIENT
Start: 2023-01-01 | End: 2023-01-01

## 2023-01-01 RX ORDER — IPRATROPIUM BROMIDE AND ALBUTEROL SULFATE 2.5; .5 MG/3ML; MG/3ML
3 SOLUTION RESPIRATORY (INHALATION) ONCE
Status: DISCONTINUED | OUTPATIENT
Start: 2023-01-01 | End: 2023-01-01

## 2023-01-01 RX ORDER — LEVALBUTEROL 1.25 MG/.5ML
1.25 SOLUTION, CONCENTRATE RESPIRATORY (INHALATION) EVERY 4 HOURS
Status: DISCONTINUED | OUTPATIENT
Start: 2023-01-01 | End: 2023-01-01

## 2023-01-01 RX ORDER — TAMSULOSIN HYDROCHLORIDE 0.4 MG/1
0.4 CAPSULE ORAL DAILY
Qty: 30 CAPSULE | Refills: 11 | Status: SHIPPED | OUTPATIENT
Start: 2023-01-01 | End: 2023-01-01

## 2023-01-01 RX ORDER — BUMETANIDE 1 MG/1
1 TABLET ORAL 2 TIMES DAILY
Status: DISCONTINUED | OUTPATIENT
Start: 2023-01-01 | End: 2023-01-01 | Stop reason: HOSPADM

## 2023-01-01 RX ORDER — TAMSULOSIN HYDROCHLORIDE 0.4 MG/1
0.4 CAPSULE ORAL DAILY
Status: DISCONTINUED | OUTPATIENT
Start: 2023-01-01 | End: 2023-01-01 | Stop reason: HOSPADM

## 2023-01-01 RX ORDER — REGADENOSON 0.08 MG/ML
0.4 INJECTION, SOLUTION INTRAVENOUS ONCE
Status: COMPLETED | OUTPATIENT
Start: 2023-01-01 | End: 2023-01-01

## 2023-01-01 RX ORDER — POTASSIUM CHLORIDE 20 MEQ/1
20 TABLET, EXTENDED RELEASE ORAL DAILY
Qty: 90 TABLET | Refills: 3 | Status: SHIPPED | OUTPATIENT
Start: 2023-01-01

## 2023-01-01 RX ORDER — CHOLECALCIFEROL (VITAMIN D3) 25 MCG
1000 TABLET ORAL DAILY
Status: DISCONTINUED | OUTPATIENT
Start: 2023-01-01 | End: 2023-01-01 | Stop reason: HOSPADM

## 2023-01-01 RX ORDER — BUMETANIDE 1 MG/1
1 TABLET ORAL DAILY
Status: DISCONTINUED | OUTPATIENT
Start: 2023-01-01 | End: 2023-01-01

## 2023-01-01 RX ORDER — BUMETANIDE 1 MG/1
1 TABLET ORAL 2 TIMES DAILY
Qty: 90 TABLET | Refills: 3 | Status: ON HOLD | OUTPATIENT
Start: 2023-01-01 | End: 2023-01-01 | Stop reason: SDUPTHER

## 2023-01-01 RX ORDER — ALBUTEROL SULFATE 90 UG/1
2 AEROSOL, METERED RESPIRATORY (INHALATION) ONCE
Status: DISCONTINUED | OUTPATIENT
Start: 2023-01-01 | End: 2023-01-01

## 2023-01-01 RX ADMIN — MULTIPLE VITAMINS W/ MINERALS TAB 1 TABLET: TAB at 10:05

## 2023-01-01 RX ADMIN — REGADENOSON 0.4 MG: 0.08 INJECTION, SOLUTION INTRAVENOUS at 10:07

## 2023-01-01 RX ADMIN — MULTIPLE VITAMINS W/ MINERALS TAB 1 TABLET: TAB at 09:05

## 2023-01-01 RX ADMIN — LEVALBUTEROL HYDROCHLORIDE 0.63 MG: 0.63 SOLUTION RESPIRATORY (INHALATION) at 02:05

## 2023-01-01 RX ADMIN — POTASSIUM CHLORIDE 20 MEQ: 1500 TABLET, EXTENDED RELEASE ORAL at 09:05

## 2023-01-01 RX ADMIN — CHOLECALCIFEROL TAB 25 MCG (1000 UNIT) 1000 UNITS: 25 TAB at 08:07

## 2023-01-01 RX ADMIN — LEVALBUTEROL HYDROCHLORIDE 0.63 MG: 0.63 SOLUTION RESPIRATORY (INHALATION) at 03:05

## 2023-01-01 RX ADMIN — TAMSULOSIN HYDROCHLORIDE 0.4 MG: 0.4 CAPSULE ORAL at 09:05

## 2023-01-01 RX ADMIN — IPRATROPIUM BROMIDE 0.5 MG: 0.5 SOLUTION RESPIRATORY (INHALATION) at 07:05

## 2023-01-01 RX ADMIN — VALSARTAN 320 MG: 160 TABLET, FILM COATED ORAL at 10:05

## 2023-01-01 RX ADMIN — CETIRIZINE HYDROCHLORIDE 10 MG: 10 TABLET, FILM COATED ORAL at 08:07

## 2023-01-01 RX ADMIN — FLUTICASONE FUROATE AND VILANTEROL TRIFENATATE 1 PUFF: 100; 25 POWDER RESPIRATORY (INHALATION) at 12:05

## 2023-01-01 RX ADMIN — TAMSULOSIN HYDROCHLORIDE 0.4 MG: 0.4 CAPSULE ORAL at 11:05

## 2023-01-01 RX ADMIN — FLUTICASONE FUROATE AND VILANTEROL TRIFENATATE 1 PUFF: 100; 25 POWDER RESPIRATORY (INHALATION) at 07:07

## 2023-01-01 RX ADMIN — NITROGLYCERIN 1 INCH: 20 OINTMENT TOPICAL at 12:07

## 2023-01-01 RX ADMIN — ENOXAPARIN SODIUM 40 MG: 40 INJECTION SUBCUTANEOUS at 06:07

## 2023-01-01 RX ADMIN — VALSARTAN 320 MG: 160 TABLET, FILM COATED ORAL at 09:05

## 2023-01-01 RX ADMIN — POTASSIUM CHLORIDE 20 MEQ: 1500 TABLET, EXTENDED RELEASE ORAL at 10:05

## 2023-01-01 RX ADMIN — VALSARTAN 320 MG: 160 TABLET, FILM COATED ORAL at 08:07

## 2023-01-01 RX ADMIN — IPRATROPIUM BROMIDE 0.5 MG: 0.5 SOLUTION RESPIRATORY (INHALATION) at 03:05

## 2023-01-01 RX ADMIN — FUROSEMIDE 80 MG: 10 INJECTION, SOLUTION INTRAMUSCULAR; INTRAVENOUS at 08:07

## 2023-01-01 RX ADMIN — PREDNISONE 40 MG: 20 TABLET ORAL at 09:05

## 2023-01-01 RX ADMIN — FUROSEMIDE 80 MG: 10 INJECTION, SOLUTION INTRAMUSCULAR; INTRAVENOUS at 05:05

## 2023-01-01 RX ADMIN — BUMETANIDE 1 MG: 1 TABLET ORAL at 08:07

## 2023-01-01 RX ADMIN — ENOXAPARIN SODIUM 40 MG: 40 INJECTION SUBCUTANEOUS at 06:05

## 2023-01-01 RX ADMIN — BUMETANIDE 2 MG: 0.25 INJECTION INTRAMUSCULAR; INTRAVENOUS at 01:07

## 2023-01-01 RX ADMIN — IPRATROPIUM BROMIDE 0.5 MG: 0.5 SOLUTION RESPIRATORY (INHALATION) at 11:05

## 2023-01-01 RX ADMIN — LEVALBUTEROL 1.25 MG: 1.25 SOLUTION, CONCENTRATE RESPIRATORY (INHALATION) at 01:07

## 2023-01-01 RX ADMIN — PREDNISONE 30 MG: 20 TABLET ORAL at 10:05

## 2023-01-01 RX ADMIN — LEVALBUTEROL HYDROCHLORIDE 0.63 MG: 0.63 SOLUTION RESPIRATORY (INHALATION) at 11:05

## 2023-01-01 RX ADMIN — ALBUTEROL SULFATE 2 PUFF: 90 AEROSOL, METERED RESPIRATORY (INHALATION) at 11:05

## 2023-01-01 RX ADMIN — FLUTICASONE FUROATE AND VILANTEROL TRIFENATATE 1 PUFF: 100; 25 POWDER RESPIRATORY (INHALATION) at 10:05

## 2023-01-01 RX ADMIN — IPRATROPIUM BROMIDE 0.5 MG: 0.5 SOLUTION RESPIRATORY (INHALATION) at 02:05

## 2023-01-01 RX ADMIN — TAMSULOSIN HYDROCHLORIDE 0.4 MG: 0.4 CAPSULE ORAL at 10:05

## 2023-01-01 RX ADMIN — LEVALBUTEROL HYDROCHLORIDE 0.63 MG: 0.63 SOLUTION RESPIRATORY (INHALATION) at 07:05

## 2023-01-01 RX ADMIN — FUROSEMIDE 80 MG: 10 INJECTION, SOLUTION INTRAMUSCULAR; INTRAVENOUS at 03:05

## 2023-01-01 RX ADMIN — IPRATROPIUM BROMIDE 0.5 MG: 0.5 SOLUTION RESPIRATORY (INHALATION) at 12:05

## 2023-01-01 RX ADMIN — FUROSEMIDE 80 MG: 10 INJECTION, SOLUTION INTRAMUSCULAR; INTRAVENOUS at 10:05

## 2023-01-01 RX ADMIN — ENOXAPARIN SODIUM 40 MG: 40 INJECTION SUBCUTANEOUS at 05:05

## 2023-02-03 ENCOUNTER — HOSPITAL ENCOUNTER (OUTPATIENT)
Dept: CARDIOLOGY | Facility: HOSPITAL | Age: 83
Discharge: HOME | End: 2023-02-03
Attending: INTERNAL MEDICINE
Payer: MEDICARE

## 2023-02-03 DIAGNOSIS — R60.0 LOCALIZED EDEMA: ICD-10-CM

## 2023-02-03 DIAGNOSIS — I89.0 LYMPHEDEMA OF BOTH LOWER EXTREMITIES: ICD-10-CM

## 2023-02-03 LAB
LEFT ABI: 1.38
LEFT ARM BP: 146 MMHG
LEFT DORSALIS PEDIS: 202 MMHG
LEFT GREAT SAPHENOUS DISTAL THIGH DIA: 0.42 CM
LEFT GREAT SAPHENOUS DISTAL THIGH REFLUX: 4144 MS
LEFT GREAT SAPHENOUS JUNCTION DIA: 0.45 CM
LEFT GREAT SAPHENOUS KNEE DIA: 0.38 CM
LEFT GREAT SAPHENOUS KNEE REFLUX: 4788 MS
LEFT GREAT SAPHENOUS MIDDLE THIGH DIA: 0.35 CM
LEFT GREAT SAPHENOUS MIDDLE THIGH REFLUX: 4561 MS
LEFT GREAT SAPHENOUS PROXIMAL CALF DIA: 0.36 CM
LEFT GREAT SAPHENOUS PROXIMAL CALF REFLUX: 2951 MS
LEFT POSTERIOR TIBIAL: 190 MMHG
LEFT SMALL SAPHENOUS KNEE DIA: 0.23 CM
LEFT SMALL SAPHENOUS KNEE REFLUX: 622 MS
LEFT SMALL SAPHENOUS SPJ DIA: 0.37 CM
RIGHT ABI: 1.29
RIGHT DORSALIS PEDIS: 188 MMHG
RIGHT GIAC DIA: 0.28 MM
RIGHT GIAC REF: 2260 MS
RIGHT GREAT SAPHENOUS DISTAL THIGH DIA: 0.53 CM
RIGHT GREAT SAPHENOUS DISTAL THIGH REFLUX: 4412 MS
RIGHT GREAT SAPHENOUS JUNCTION DIA: 0.58 CM
RIGHT GREAT SAPHENOUS JUNCTION REFLUX: 674 MS
RIGHT GREAT SAPHENOUS KNEE DIA: 0.37 CM
RIGHT GREAT SAPHENOUS KNEE REFLUX: 3191 MS
RIGHT GREAT SAPHENOUS MIDDLE THIGH DIA: 0.43 CM
RIGHT GREAT SAPHENOUS MIDDLE THIGH REFLUX: 4921 MS
RIGHT GREAT SAPHENOUS PROXIMAL CALF DIA: 0.42 CM
RIGHT GREAT SAPHENOUS PROXIMAL CALF REFLUX: 4892 MS
RIGHT POSTERIOR TIBIAL: 180 MMHG
RIGHT SMALL SAPHENOUS KNEE DIA: 0.42 CM
RIGHT SMALL SAPHENOUS KNEE REFLUX: 5149 MS
RIGHT SMALL SAPHENOUS SPJ DIA: 0.33 CM
RIGHT SMALL SAPHENOUS SPJ REFLUX: 4567 MS

## 2023-02-03 PROCEDURE — 93970 CV US LOWER VENOUS INSUFFICIENCY BILATERAL (CUPID ONLY): ICD-10-PCS | Mod: 26,,, | Performed by: INTERNAL MEDICINE

## 2023-02-03 PROCEDURE — 93970 EXTREMITY STUDY: CPT | Mod: TC

## 2023-02-03 PROCEDURE — 93922 ANKLE BRACHIAL INDICES (ABI): ICD-10-PCS | Mod: 26,,, | Performed by: INTERNAL MEDICINE

## 2023-02-03 PROCEDURE — 93922 UPR/L XTREMITY ART 2 LEVELS: CPT

## 2023-02-03 PROCEDURE — 93970 EXTREMITY STUDY: CPT | Mod: 26,,, | Performed by: INTERNAL MEDICINE

## 2023-02-03 PROCEDURE — 93922 UPR/L XTREMITY ART 2 LEVELS: CPT | Mod: 26,,, | Performed by: INTERNAL MEDICINE

## 2023-02-09 LAB
CHOLEST SERPL-MCNC: 143 MG/DL
CHOLEST/HDLC SERPL: 1.9 (CALC)
HDLC SERPL-MCNC: 74 MG/DL
LDLC SERPL CALC-MCNC: 58 MG/DL (CALC)
NONHDLC SERPL-MCNC: 69 MG/DL (CALC)
TRIGL SERPL-MCNC: 37 MG/DL

## 2023-02-15 PROBLEM — I73.9 PAD (PERIPHERAL ARTERY DISEASE): Status: ACTIVE | Noted: 2023-01-01

## 2023-02-15 NOTE — PATIENT INSTRUCTIONS
Assessment/Plan:  Alexander Kelly is a 82 y.o. male with HFpEF, HTN, COPD, bilateral hearing loss, alcoholism, obesity, who presents for a follow up appointment.    1. BLE Lymphedema with Venous Insufficiency- Stable.  Continue bumex 1 mg daily. Pt to limit sodium intake to 2,000 mg daily.  Limit volume intake to 1.5 liters daily.      2. Chronic HFpEF- Stable.  Echo as above.  Continue current medications.  Check cmp and lipids.     3. HTN- Continue current medications.  Pt to keep log of blood pressure/heart rate and bring in next visit for review.     4. Obesity- Encourage diet, exercise and weight loss.      Follow up in 6 months

## 2023-02-15 NOTE — PROGRESS NOTES
[facility]      Chief Complaint   Patient presents with    venous insufficiency    Lymphedema of both lower extremities       Patient ID: Alexander Kelly is a 82 y.o. male with HFpEF, HTN, COPD, bilateral hearing loss, alcoholism, obesity, who presents for a follow up appointment.  Pertinent history/events are as follows:     -Pt kindly referred by Dr. Martin for evaluation of lymphedema.    -At our initial clinic visit on 2/9/2022, Mr. Kelly states he was diagnosed with BLE lymphedema 4 years ago.  He uses a lymphedema pump daily.  Reports gaining 30 pounds in 3 weeks in 12/2021.  States he lost the weight after being started on bumex and metolazone.  Reports SOB starting 1 month ago, which is new for him.  He currently takes bumex 1 mg daily, and metolazone 2.5 mg daily at needed for weight gain.  Formerly smoked 1 pack a day for 16 years.  Quit at age 32.  Plan:   BLE Lymphedema with Venous Stasis Wounds- Check BLE venous reflux study and TUNDE study.  Refer to lymphedema clinic.  Continue bumex 1 mg daily.  Continue metolazone 2.5 mg daily at needed for weight gain of at least 2 pounds in a day or 5 pounds in a week.  Refer to wound care.  Given rash, refer to Dermatology for consideration for biopsy.   Pt to limit sodium intake to 2,000 mg daily.  Limit volume intake to 1.5 liters daily.    SOB- Check echo, CXR, and BNP.    HTN- Continue current medications.  Pt to keep log of blood pressure/heart rate and bring in next visit for review.   Obesity- Encourage diet, exercise and weight loss.    -2/14/2022: I talked with Mr. Kelly in detail.  He has severe SOB.  Echo done today shows:   Echo 2/14/2022:  The left ventricle is normal in size with normal systolic function.  The estimated ejection fraction is 65%.  Grade II left ventricular diastolic dysfunction.  The ascending aorta is mildly dilated.  Mild right ventricular enlargement with normal right ventricular systolic function.  Mild tricuspid  regurgitation.  The estimated PA systolic pressure is 66 mmHg.  The IVC is enlarged, measuring >3cm. Elevated central venous pressure (15 mmHg).  There is pulmonary hypertension.  Biatrial enlargement.  Moderate circumferential pericardial effusion with largest diameter posterolaterally at 1.5cm.  There is a left pleural effusion    Outside labs from 2/10/2022 shows serum sodium of 126 with potassium of 5.2.  Given these findings and his severe SOB, pt will require inpatient admission and further management of severe volume overload.  I instructed Mr. Kelly to report immediately to the emergency department for admission.  His wife states they live in [place] and will go to [facility] for admission.      -On 2/14/2022, pt was admitted to [facility] with acute hypoxic respiratory failure due to volume overload with acute on chronic diastolic heart failure exacerbation. He was initiated on CHF pathway with IV lasix. Good diuresis with improvement in hypoxia and dyspnea. Initially required lasix gtt which was then de-escalated to IV pushes then PO bumex.  Weight at discharge was 240 pounds.     -At follow up clinic visit on 3/23/2022, Mr. Kelly was accompanied by his wife.  States he feels much better since hospital discharge.  He has no chest pain or SOB.  States he's taking all medications as prescribed with no issues.   Plan:   BLE Lymphedema with Venous Stasis Wounds- Wounds have now completely healed.  BLE Venous Ultrasound on 2/15/2022 revealed no findings of right or left lower extremity deep venous thrombophlebitis.  Continue bumex 1 mg daily.    Pt to limit sodium intake to 2,000 mg daily.  Limit volume intake to 1.5 liters daily.    SOB- Due to acute on chronic HFPEF, which has now resolved.  Echo as above.  Continue current medications.    HTN- Continue current medications.  Pt to keep log of blood pressure/heart rate and bring in next visit for review.   Obesity- Encourage diet, exercise and weight  loss.      6/27/2022 clinic visit: Mr. Kelly is accompanied by his wife.  He reports doing well with no chest pain, SOB, significant LE edema, TIA symptoms or syncope.  States he's taking all medications as prescribed with no issues.  Plan:   BLE Lymphedema with Venous Stasis Wounds- Wounds have now completely healed.  BLE Venous Ultrasound on 2/15/2022 revealed no findings of right or left lower extremity deep venous thrombophlebitis.  Continue bumex 1 mg daily.    Pt to limit sodium intake to 2,000 mg daily.  Limit volume intake to 1.5 liters daily.    Chronic HFpEF- Stable.  Echo as above.  Continue current medications.  Check cmp and lipids.   HTN- Continue current medications.  Pt to keep log of blood pressure/heart rate and bring in next visit for review.   Obesity- Encourage diet, exercise and weight loss.      HPI:  Mr. Kelly is accompanied by his wife.  He reports doing well with no chest pain, SOB, significant LE edema, TIA symptoms or syncope.  States he's taking all medications as prescribed with no issues.   He has gained 15 pounds since clinic visit on 6/27/2022.      Past Medical History:   Diagnosis Date    Bilateral hearing loss     Hypertension      No past surgical history on file.  Social History     Socioeconomic History    Marital status:    Tobacco Use    Smoking status: Never    Smokeless tobacco: Never     Social Determinants of Health     Financial Resource Strain: Low Risk     Difficulty of Paying Living Expenses: Not hard at all   Food Insecurity: No Food Insecurity    Worried About Running Out of Food in the Last Year: Never true    Ran Out of Food in the Last Year: Never true   Transportation Needs: No Transportation Needs    Lack of Transportation (Medical): No    Lack of Transportation (Non-Medical): No   Social Connections: Unknown    Marital Status:    Housing Stability: Low Risk     Unable to Pay for Housing in the Last Year: No    Number of Places Lived in the Last  "Year: 1    Unstable Housing in the Last Year: No     Family History   Problem Relation Age of Onset    No Known Problems Mother     No Known Problems Father        Review of patient's allergies indicates:  No Known Allergies    Medication List with Changes/Refills   Current Medications    ALBUTEROL (VENTOLIN HFA) 90 MCG/ACTUATION INHALER    Inhale 2 puffs into the lungs every 6 (six) hours as needed for Wheezing. Rescue    AMMONIUM LACTATE 12 % CREA    Apply 1 g topically 2 (two) times daily.    BUDESONIDE-FORMOTEROL 160-4.5 MCG (SYMBICORT) 160-4.5 MCG/ACTUATION HFAA    Inhale 2 puffs into the lungs every 12 (twelve) hours.    BUMETANIDE (BUMEX) 1 MG TABLET    Take 1 tablet (1 mg total) by mouth once daily.    POTASSIUM CHLORIDE SA (K-DUR,KLOR-CON) 20 MEQ TABLET    Take 1 tablet (20 mEq total) by mouth once daily.    VALSARTAN (DIOVAN) 320 MG TABLET    Take 1 tablet (320 mg total) by mouth once daily.       Review of Systems  Constitution: Denies chills, fever, and sweats.  HENT: Denies headaches or blurry vision.  Cardiovascular: Denies chest pain or irregular heart beat.  Respiratory: Denies cough or shortness of breath.  Gastrointestinal: Denies abdominal pain, nausea, or vomiting.  Musculoskeletal: Negative for leg swelling.  Neurological: Denies dizziness or focal weakness.  Psychiatric/Behavioral: Normal mental status.  Hematologic/Lymphatic: Denies bleeding problem or easy bruising/bleeding.  Skin: Denies rash or suspicious lesions    Physical Examination  BP (!) 147/67   Pulse 71   Ht 5' 11" (1.803 m)   Wt 104.6 kg (230 lb 9.6 oz)   BMI 32.16 kg/m²     Constitutional: No acute distress, conversant  HEENT: Sclera anicteric, Pupils equal, round and reactive to light, extraocular motions intact, Oropharynx clear  Neck: No JVD, no carotid bruits  Cardiovascular: regular rate and rhythm, no murmur, rubs or gallops, normal S1/S2  Pulmonary: Clear to auscultation bilaterally  Abdominal: Abdomen soft, nontender, " nondistended, positive bowel sounds  Extremities: BLE's with trace pitting edema and changes consistent with lymphedema  Left shin with a moderate sized healing wound.   Both legs with diffuse, erythematous, lacy rash    Pulses:  Carotid pulses are 2+ on the right side, and 2+ on the left side.  Radial pulses are 2+ on the right side, and 2+ on the left side.   Femoral pulses are 2+ on the right side, and 2+ on the left side.  Popliteal pulses are 2+ on the right side, and 2+ on the left side.   Dorsalis pedis pulses are 2+ on the right side, and 2+ on the left side.   Posterior tibial pulses are 2+ on the right side, and 2+ on the left side.    Skin: No ecchymosis, erythema, or ulcers  Psych: Alert and oriented x 3, appropriate affect  Neuro: CNII-XII intact, no focal deficits    Labs:  Most Recent Data  CBC:   Lab Results   Component Value Date    WBC 7.61 02/14/2022    HGB 14.6 02/14/2022    HCT 44.7 02/14/2022     02/14/2022    MCV 93 02/14/2022    RDW 15.4 (H) 02/14/2022     BMP:   Lab Results   Component Value Date     (L) 06/27/2022    K 4.6 06/27/2022    CL 97 06/27/2022    CO2 26 06/27/2022    BUN 13 06/27/2022    CREATININE 0.7 06/27/2022    GLU 85 06/27/2022    CALCIUM 9.2 06/27/2022    MG 2.0 02/15/2022     LFTS;   Lab Results   Component Value Date    PROT 6.6 06/27/2022    ALBUMIN 3.6 06/27/2022    BILITOT 1.3 (H) 06/27/2022    AST 20 06/27/2022    ALKPHOS 102 06/27/2022    ALT 14 06/27/2022     COAGS: No results found for: INR, PROTIME, PTT  FLP:   Lab Results   Component Value Date    CHOL 143 02/08/2023    HDL 74 02/08/2023    LDLCALC 58 02/08/2023    TRIG 37 02/08/2023    CHOLHDL 1.9 02/08/2023     CARDIAC:   Lab Results   Component Value Date    TROPONINI 0.016 02/15/2022     (H) 06/27/2022       Echo 2/14/2022:  The left ventricle is normal in size with normal systolic function.  The estimated ejection fraction is 65%.  Grade II left ventricular diastolic dysfunction.  The  ascending aorta is mildly dilated.  Mild right ventricular enlargement with normal right ventricular systolic function.  Mild tricuspid regurgitation.  The estimated PA systolic pressure is 66 mmHg.  The IVC is enlarged, measuring >3cm. Elevated central venous pressure (15 mmHg).  There is pulmonary hypertension.  Biatrial enlargement.  Moderate circumferential pericardial effusion with largest diameter posterolaterally at 1.5cm.  There is a left pleural effusion.    BLE Venous Ultrasound 2/15/2022:  1. No findings of right or left lower extremity deep venous thrombophlebitis  2. Bilateral lower extremity soft tissue edema.  3. Prominent enlarged left groin lymph node.    Assessment/Plan:  Alexander Kelly is a 82 y.o. male with HFpEF, HTN, COPD, bilateral hearing loss, alcoholism, obesity, who presents for a follow up appointment.    1. BLE Lymphedema with Venous Insufficiency- Stable.  Continue bumex 1 mg daily. Pt to limit sodium intake to 2,000 mg daily.  Limit volume intake to 1.5 liters daily.      2. Chronic HFpEF- Stable.  Echo as above.  Continue current medications.  Check cmp and lipids.     3. HTN- Continue current medications.  Pt to keep log of blood pressure/heart rate and bring in next visit for review.     4. Obesity- Encourage diet, exercise and weight loss.      Follow up in 6 months     Total duration of face to face visit time 30 minutes.  Total time spent counseling greater than fifty percent of total visit time.  Counseling included discussion regarding imaging findings, diagnosis, possibilities, treatment options, risks and benefits.  The patient had many questions regarding the options and long-term effects.    Artru Healy MD, PhD  Interventional Cardiology

## 2023-02-28 NOTE — TELEPHONE ENCOUNTER
----- Message from Dasha ePna sent at 2/28/2023  2:51 PM CST -----  Regarding: please call  The pt is calling to speak with the MA. Please call him back @ [phone]. Thanks, Dasha

## 2023-05-25 PROBLEM — J81.1 PULMONARY EDEMA: Status: ACTIVE | Noted: 2023-01-01

## 2023-05-25 PROBLEM — E87.70 VOLUME OVERLOAD: Status: ACTIVE | Noted: 2023-01-01

## 2023-05-25 PROBLEM — E87.1 HYPONATREMIA: Status: ACTIVE | Noted: 2023-01-01

## 2023-05-25 PROBLEM — D64.9 NORMOCYTIC ANEMIA: Status: ACTIVE | Noted: 2023-01-01

## 2023-05-25 NOTE — ED NOTES
Pt in room 23 from sort process. IV access obtained and blood results pending. Pt awake and alert and has family at bedside. Plan of care reviewed, call bell within reach.

## 2023-05-25 NOTE — FIRST PROVIDER EVALUATION
Emergency Department TeleTriage Encounter Note      CHIEF COMPLAINT    Chief Complaint   Patient presents with    Shortness of Breath     Worsening dyspnea with exertion x4 days. 8lb. Weight gain in 3 days. History of chf.        VITAL SIGNS   Initial Vitals [05/25/23 1557]   BP Pulse Resp Temp SpO2   133/63 75 20 98.1 °F (36.7 °C) (!) 94 %      MAP       --            ALLERGIES    Review of patient's allergies indicates:  No Known Allergies    PROVIDER TRIAGE NOTE  This is a teletriage evaluation of a 83 y.o. male presenting to the ED with c/o SOB with exertion for 4 days.  Reports weight gain as well.  Denies CP. Limited physical exam via telehealth: The patient is awake, alert, answering questions appropriately and is not in respiratory distress.  As the Teletriage provider, I performed an initial assessment and ordered appropriate labs and imaging studies, if any, to facilitate the patient's care once placed in the ED. Once a room is available, care and a full evaluation will be completed by an alternate ED provider.  Any additional orders and the final disposition will be determined by that provider.  All imaging and labs will not be followed-up by the Teletriage Team, including myself.          ORDERS  Labs Reviewed - No data to display    ED Orders (720h ago, onward)      Start Ordered     Status Ordering Provider    05/25/23 1607 05/25/23 1606  Cardiac Monitoring - Adult  Continuous        Comments: Notify Physician If:    Ordered LETI WOLFE    05/25/23 1607 05/25/23 1606  Pulse Oximetry Continuous  Continuous         Ordered LETI WOLFE    Unscheduled 05/25/23 1606  Confirm Patient is not Eligible for Congestive Heart Failure Pathway  Until discontinued         Ordered LETI WOLFE    Unscheduled 05/25/23 1606  Vital signs  Every 30 min         Ordered LETI WOLFE    Unscheduled 05/25/23 1606  Insert peripheral IV  Once         Ordered LETI WOLFE    Unscheduled 05/25/23 1606  CBC auto  differential  STAT         Ordered LETI WOLFE    Unscheduled 05/25/23 1606  Comprehensive metabolic panel  STAT         Ordered LETI WOLFE    Unscheduled 05/25/23 1606  Troponin I  STAT         Ordered LETI WOLFE    Unscheduled 05/25/23 1606  Brain natriuretic peptide  STAT         Ordered LETI WOLFE    Unscheduled 05/25/23 1606  EKG 12-lead  Once         Ordered LETI WOLFE    Unscheduled 05/25/23 1606  X-Ray Chest AP Portable  1 time imaging         Ordered LETI WOLFE    Unscheduled 05/25/23 1606  Oxygen Continuous  Continuous         Ordered LETI WOLFE              Virtual Visit Note: The provider triage portion of this emergency department evaluation and documentation was performed via [employer], a HIPAA-compliant telemedicine application, in concert with a tele-presenter in the room. A face to face patient evaluation with one of my colleagues will occur once the patient is placed in an emergency department room.      DISCLAIMER: This note was prepared with [employer] voice recognition transcription software. Garbled syntax, mangled pronouns, and other bizarre constructions may be attributed to that software system.

## 2023-05-25 NOTE — TELEPHONE ENCOUNTER
----- Message from Artur Healy MD PhD sent at 5/25/2023 12:43 PM CDT -----  Regarding: RE: 5 lbs Weight Gain  I recommend the patient report immediately to the emergency room for evaluation.  ----- Message -----  From: Angeli Suggs MA  Sent: 5/25/2023  11:33 AM CDT  To: Artur Healy MD PhD  Subject: FW: 5 lbs Weight Gain                            Please advise. Pt is retaining fluid since last night and complains of shortness of breath.   ----- Message -----  From: Damir Mack  Sent: 5/25/2023  11:26 AM CDT  To: [facility] Staff  Subject: 5 lbs Weight Gain                                Pt has gained 5 lbs since yesterday   Contact @ [phone]    Thanks

## 2023-05-25 NOTE — ED PROVIDER NOTES
Emergency Department Encounter  Provider Note  Encounter Date: 5/25/2023    Patient Name: Alexander Kelly  MRN: [id number]    History of Present Illness   HPI  History of Present Illness:    Chief Complaint:   Chief Complaint   Patient presents with    Shortness of Breath     Worsening dyspnea with exertion x4 days. 8lb. Weight gain in 3 days. History of chf.        83-year-old male presenting with an 8 lb weight gain for the past 3-4 days, along with shortness of breath, leg swelling, increased abdominal distention.  Patient states that he ate shrimp and crawfish last week, but otherwise has been sticking to a lower sodium diet.  Denies any chest pain.    The following PMH/PSH/SocHx/FamHx has been reviewed by myself:    Past Medical History:   Diagnosis Date    Bilateral hearing loss     Hypertension      No past surgical history on file.  Social History     Tobacco Use    Smoking status: Never    Smokeless tobacco: Never     Family History   Problem Relation Age of Onset    No Known Problems Mother     No Known Problems Father        Allergies reviewed:  Review of patient's allergies indicates:  No Known Allergies    Medications reviewed:  Medication List with Changes/Refills   Current Medications    ALBUTEROL (VENTOLIN HFA) 90 MCG/ACTUATION INHALER    Inhale 2 puffs into the lungs every 6 (six) hours as needed for Wheezing. Rescue    AMMONIUM LACTATE 12 % CREA    Apply 1 g topically 2 (two) times daily.    BUDESONIDE-FORMOTEROL 160-4.5 MCG (SYMBICORT) 160-4.5 MCG/ACTUATION HFAA    Inhale 2 puffs into the lungs every 12 (twelve) hours.    BUMETANIDE (BUMEX) 1 MG TABLET    Take 1 tablet (1 mg total) by mouth once daily.    POTASSIUM CHLORIDE SA (K-DUR,KLOR-CON) 20 MEQ TABLET    Take 1 tablet (20 mEq total) by mouth once daily.    VALSARTAN (DIOVAN) 320 MG TABLET    Take 1 tablet (320 mg total) by mouth once daily.       ROS  Review of Systems:    Constitutional:  Negative for fever.   HENT:  Negative for sore throat.     Eyes:  Negative for redness.   Respiratory:  Positive for shortness of breath.    Cardiovascular:  Negative for chest pain.   Gastrointestinal:  Negative for nausea.   Genitourinary:  Negative for dysuria.   Musculoskeletal:  Negative for back pain.   Skin:  Negative for rash.   Neurological:  Negative for weakness.   Hematological:  Does not bruise/bleed easily.   Psychiatric/Behavioral:  The patient is not nervous/anxious.      Physical Exam   Physical Exam    Initial Vitals [05/25/23 1557]   BP Pulse Resp Temp SpO2   133/63 75 20 98.1 °F (36.7 °C) (!) 94 %      MAP       --           Triage vital signs reviewed.    Constitutional: Well-nourished, well-developed. Not in acute distress.  HENT: Normocephalic, atraumatic. Moist mucous membranes.  Eyes: No conjunctival injection.  Resp: Normal respiratory effort, breathing unlabored.  Cardio: Regular rate and rhythm.  GI: Abdomen distended.  MSK:  Bilateral lower extremity pitting edema to the mid shins.  Skin: Warm and dry. No rashes or lesions noted.  Neuro: Awake and alert. Moves all extremities.    ED Course   Procedures    Medical Decision Making    History Acquisition     The history is provided by the patient.   Assessment requiring an independent historian and why historian was needed:  Family is giving supplemental history.    Review of prior external/non ED notes:  Patient's cardiologist note 2023    Differential Diagnoses   Based on available information and initial assessment, the working Differential Diagnosis includes, but is not limited to:  PE, MI/ACS, pneumothorax, pericardial effusion/tamonade, pneumonia, lung abscess, pericarditis/myocarditis, pleural effusion, lung mass, CHF exacerbation, asthma exacerbation, COPD exacerbation, aspirated/ingested foreign body, airway obstruction, CO poisoning, anemia, metabolic derangement, allergy/atopy, influenza, viral URI, viral syndrome.  .    EKG   EKG ordered and independently reviewed by me:   Sinus  rhythm, PACs, rate 75, normal intervals, no STEMI    Labs   Lab tests ordered and independently reviewed by me:    Labs Reviewed   CBC W/ AUTO DIFFERENTIAL - Abnormal; Notable for the following components:       Result Value    RBC 4.44 (*)     Hemoglobin 13.2 (*)     Hematocrit 38.7 (*)     Immature Granulocytes 0.7 (*)     Gran # (ANC) 7.8 (*)     Immature Grans (Abs) 0.07 (*)     Lymph # 0.8 (*)     Mono # 1.4 (*)     Gran % 77.7 (*)     Lymph % 7.8 (*)     All other components within normal limits   COMPREHENSIVE METABOLIC PANEL - Abnormal; Notable for the following components:    Sodium 121 (*)     Chloride 92 (*)     Glucose 127 (*)     Albumin 3.4 (*)     Total Bilirubin 1.3 (*)     Anion Gap 5 (*)     All other components within normal limits   B-TYPE NATRIURETIC PEPTIDE - Abnormal; Notable for the following components:     (*)     All other components within normal limits   TROPONIN I         Imaging   Imaging ordered and independently reviewed by me:   Imaging Results              X-Ray Chest AP Portable (Final result)  Result time 05/25/23 16:57:28      Final result by Bart Reeves MD (05/25/23 16:57:28)                   Impression:      1. Pulmonary findings suggest edema with bilateral pleural effusions, left greater than right.  Developing left lower lung zone consolidation not excluded.      Electronically signed by: Bart Reeves MD  Date:    05/25/2023  Time:    16:57               Narrative:    EXAMINATION:  XR CHEST AP PORTABLE    CLINICAL HISTORY:  CHF;    TECHNIQUE:  Single frontal view of the chest was performed.    COMPARISON:  02/14/2022    FINDINGS:  The cardiomediastinal silhouette is not enlarged, magnified by technique noting calcification of the aorta..  There is obscuration of the bilateral costophrenic angles suggesting effusions, the effusion has improved somewhat on the left as compared to the previous exam..  The trachea is midline.  The lungs are symmetrically  expanded bilaterally with coarse interstitial attenuation bilaterally.  There is bilateral basilar subsegmental atelectasis..  Developing left lower lung zone consolidation not excluded.  There is no pneumothorax.  The osseous structures are remarkable for degenerative changes and osteopenia..                                           Additional Consideration   Alexander Kelly  presents to the emergency Department today with shortness of breath, weight gain, leg swelling.  Clinical symptoms of fluid overload.  No chest pain.  EKG nonischemic.  Patient will need to be admitted for IV diuresis.    Additional testing considered but not indicated during the course of this workup: further imaging and labwork, not indicated  Co-morbidities/chronic illness/exacerbation of chronic illness considered which impacted clinical decision making:  CHF, lymphedema, hypertension, obesity  Procedures done in the ED or plan for the OR: No  Social determinants of care considered during development of treatment plan include: Decreased medical literacy  Discussion of management or test interpretation with external provider: Yes, describe:  Admitting hospitalist  DNR discussion: No    The patient's list of active medications and allergies as documented per RN staff has been reviewed.  Medications given in the ED and/or prescribed:   Medications   furosemide injection 80 mg (80 mg Intravenous Given 5/25/23 1730)             ED Course as of 05/25/23 1813   Thu May 25, 2023   1812 CBC auto differential(!) [CS]   1812 Comprehensive metabolic panel(!)  Marked hyponatremia, asymptomatic. [CS]   1812 Troponin I [CS]   1812 Brain natriuretic peptide(!) [CS]   1812 Brain natriuretic peptide(!) [CS]   1813 X-Ray Chest AP Portable  Fluid overload, no pneumonia [CS]   1813 Patient with hyponatremia, signs and symptoms of fluid overload.  Will give IV Lasix, admit to [facility]. [CS]      ED Course User Index  [CS] Madhavi Pelaez MD        Explanation of disposition:  Admission    Clinical Impression:     1. Shortness of breath             Madhavi Pelaez MD  05/25/23 7044

## 2023-05-26 PROBLEM — J44.1 COPD EXACERBATION: Status: ACTIVE | Noted: 2023-01-01

## 2023-05-26 PROBLEM — E80.6 HYPERBILIRUBINEMIA: Status: ACTIVE | Noted: 2023-01-01

## 2023-05-26 NOTE — CONSULTS
Food & Nutrition  Education    Diet Education:  Cardiac Education   Time Spent: RD remote  Learners: Patient       Nutrition Education provided with handouts:   + Clinical Reference attachments to d/c documents      Comments:  Patient on a cardiac diet.  Patient has had previous cardiac education.  Patient admits that he tries to follow the cardiac diet but has consumed high salt foods recently.  Diuretics in use as patient admitted with a 7lb weight gain within 24 hours.  Labs reviewed.  NKFA. LBM:5/24/2023. RD to continue to monitor po intake/tolerance, reinforce cardiac diet restrictions and remind patient the importance of cardiac diet compliance.  I/O since admit -2100mL.     Labs Reviewed:  BMP  Lab Results   Component Value Date     (L) 05/26/2023    K 4.1 05/26/2023    CL 90 (L) 05/26/2023    CO2 28 05/26/2023    BUN 14 05/26/2023    CREATININE 0.8 05/26/2023    CALCIUM 9.3 05/26/2023    ANIONGAP 10 05/26/2023    EGFRNORACEVR >60 05/26/2023     No results found for: LABA1C, HGBA1C  Lab Results   Component Value Date    CALCIUM 9.3 05/26/2023    PHOS 3.2 05/26/2023     Medications Reviewed:  Scheduled Meds:   enoxparin  40 mg Subcutaneous Daily    fluticasone furoate-vilanteroL  1 puff Inhalation Daily    ipratropium  0.5 mg Nebulization Q4H    levalbuterol  0.63 mg Nebulization Q4H    multivitamin  1 tablet Oral Daily    potassium chloride SA  20 mEq Oral Daily    [START ON 5/27/2023] predniSONE  40 mg Oral Daily    tamsulosin  0.4 mg Oral Daily    valsartan  320 mg Oral Daily     Continuous Infusions:  PRN Meds:.albuterol, sodium chloride 0.9%        Intake/Output Summary (Last 24 hours) at 5/26/2023 1312  Last data filed at 5/26/2023 1304  Gross per 24 hour   Intake 800 ml   Output 2900 ml   Net -2100 ml           All questions and concerns answered. Dietitian's contact information provided.       Follow-Up:  Yes     Please Re-consult as needed        Thanks!  Giselle Guzmán, MS, RDN, LDN

## 2023-05-26 NOTE — ED NOTES
Report given and tele box put on patient. Pt to be transferred to room 468 via wheelchair with ER tech.

## 2023-05-26 NOTE — PROGRESS NOTES
"[facility] Hospital Medicine Progress Note    Primary Team: [facility] Hospitalist Team A  Attending Physician: Anastasia García MD  Resident: Lico  Intern: Alessandro    Subjective:      Doing well this morning but left his hearing aid batteries at home. Had a large urine void that leaked out of pure wick last night and was not recorded.      Objective:     Last 24 Hour Vital Signs:  BP  Min: 133/63  Max: 157/73  Temp  Av.2 °F (36.2 °C)  Min: 96.7 °F (35.9 °C)  Max: 98.1 °F (36.7 °C)  Pulse  Av  Min: 66  Max: 81  Resp  Av.6  Min: 16  Max: 26  SpO2  Av.8 %  Min: 92 %  Max: 98 %  Height  Av' 11" (180.3 cm)  Min: 5' 11" (180.3 cm)  Max: 5' 11" (180.3 cm)  Weight  Av.5 kg (223 lb 12.6 oz)  Min: 100 kg (220 lb 7.4 oz)  Max: 104.3 kg (230 lb)  I/O last 3 completed shifts:  In: 500 [P.O.:500]  Out: 2300 [Urine:2300]    Physical Examination:  Physical Exam  General:  Well-developed, well-nourished in no apparent distress  HEENT:  Normocephalic, atraumatic, PEERLA , no discharge from either eye, no scleral icterus, external ears with no discharge, oropharynx is clear and moist; lateral right eye with yellow smooth growth; actinic keratoses on scalp  Neck: supple, normal range of motion, no thyromegaly present, no cervical LAD, JVP ~10  Respiratory: diffuse wheezing bilaterally, decreased breath sounds in lower lung fields, decreased fremitus on left lung, symmetrical chest expansion with no increased work of breathing on room air  Cardiovascular: Normal rate, regular rhythm and normal heart sounds with no murmurs, rubs, or gallops, pulses 2+ and symmetric throughout  Abdominal: protuberant abdomen, non-tender, umbilical and supraumbilical hernias  Musculoskeletal: Normal range of motion in all extremities  Neurological: AAO x 4, CN 2-12 grossly intact  Extremities and Skin: Skin is warm and dry, bilateral lower extremity edema with clean sores on left anterior shin; bilateral pitting edema  Psychiatric: Normal " mood and affect, normal behavior    Laboratory:  Recent Labs   Lab 05/25/23  1616 05/25/23  2121 05/26/23  0352   WBC 10.09  --  7.58   HGB 13.2*  --  13.3*   HCT 38.7*  --  39.3*     --  270   MCV 87  --  87   RDW 13.9  --  14.0   * 124* 128*   K 5.1 4.2 4.1   CL 92* 89* 90*   CO2 24 29 28   BUN 13 16 14   CREATININE 0.8 0.9 0.8   * 111* 98   PROT 6.6  --  6.9   ALBUMIN 3.4*  --  3.2*   BILITOT 1.3*  --  1.1*   AST 25  --  20   ALKPHOS 114  --  117   ALT 29  --  21     Other Results:  EKG (my interpretation): reviewed    Radiology Data Reviewed:   Pertinent Findings:  No new imaging    Current Medications:     Infusions:       Scheduled:   albuterol-ipratropium  3 mL Nebulization Once    bumetanide  1 mg Oral Daily    enoxparin  40 mg Subcutaneous Daily    fluticasone furoate-vilanteroL  1 puff Inhalation Daily    multivitamin  1 tablet Oral Daily    potassium chloride SA  20 mEq Oral Daily    tamsulosin  0.4 mg Oral Daily    valsartan  320 mg Oral Daily        PRN:  albuterol, sodium chloride 0.9%      Assessment:     Alexander Kelly is a 83 y.o.male with  PMHx of HFpEF, HTN, COPD, bilateral hearing loss, alcoholism, obesity, cirrhosis (per daughter), BPH  presents to [facility] on 5/25/23 with worsening SOB for 4 days and 7 lb weight gain overnight. CXR with bilateral pleural effusions, elevated BNP consistent with acute on chronic HFpEF exacerbation. Admit for IV diuresis. Hyponatremic below baseline, likely due to excess volume.      Plan:     Acute on chronic diastolic heart failure exacerbation   - last ECHO 2/22 with EF 65% and grade 2 LV diastolic dysfunction, bi atrial enlargement, pulm HTN  - follow with Dr. Healy, cardiology   -   - repeat ECHO   - s/p 80mg IV lasix in ED, follow urine output and dose intermittently   - strict ins & outs, daily weights  - 1.5L fluid restriction, cardiac diet   - 2L urine output last night but now urine output has dropped off,  will redose IV lasix 80     Bilateral pleural effusions L>R  - reassess after diuresis; may be 2/2 volume overload but if not improving, will work up further     Pericardial effusion  - noted on prior TTE as well as bedside echo  - no hemodynamic compromise, continue to monitor  - repeat TTE pending      Acute on chronic hyponatremia, improving  Hypervolemic hyponatremia, improving  - follow serial BMPs  - baseline Na likely ~132  - Na 121 on admit, improved to 124 with diuresis  - per chart review, no work up of hyponatremia   - TSH wnl  - acute component likely due to volume overload in the setting of acute on chronic HFpEF   - UA wnl, urine Na 73, and Urine osm 266     Acute on chronic COPD exacerbation   Diffuse wheezing  - continue home symbicort&albuterol PRN   - wheezing possibly due to pulmonary edema rather than COPD exacerbation however his lung sounds are more tight this morning with less air movement  - Duo Nebs x2 and 5 day prednisone course      HTN  - per chart review patient on Valsartan 325 daily, however he believes he is still taking irbesartan 300 mg daily though this prescription looks to be discontinued  - will further clarify with patient prior to discharge   - restarted valsartan while inpatient      PAD with chronic lymphedema  Venous stasis dermatitis  - patient and family feel the size of his lower extremities is at baseline   - continue diuresis  - patient has lymphedema pumps at home      Elevated bilirubin, chronic  ?cirrhosis  - T bili 1.3 on admit, elevated on previous admissions  - long history of alcohol use   - his daughter says he was told he has cirrhosis but not documented  - no ascites on bedside US  - INR 1.1  - Abd US pending      Mild normocytic anemia   - possibly dilutional in the setting of volume overload  - iron studies pending, ferritin elevated at 341  - will continue to monitor      Diet: cardiac  DVT PPX: lovenox  Dispo: admit for diuresis    Liz Francois MD  [employer]  Med Peds PGY 1    [facility] Medicine Hospitalist Pager numbers:   [facility] Hospitalist Medicine Team A (Hakan/Raquel): [phone]  [facility] Hospitalist Medicine Team B (Franklyn/Rhett):  [phone]

## 2023-05-26 NOTE — NURSING
Per ultrasound tech the patient needs to be NPO for 8 hours prior to study. Pt had dinner tray in ED therefore will schedule ultrasound for tomorrow.

## 2023-05-26 NOTE — PLAN OF CARE
HETAL met with pt at bedside to complete DCA this AM. Per pt he lives at home with his wife Chelsea [phone] and will have her help transport him home at time of d/c. Per pt he has a RW and WC at home but he does not use them. SW requested f/u appts. White board updated with CM name and contact information.  Discharge brochure provided.  Pt encouraged to call with any questions or concerns.  Cm will continue to follow pt through transitions of care and assist with any discharge needs.    Rian Knutson MSVANESSA  [phone]     05/26/23 1052   Discharge Assessment   Assessment Type Discharge Planning Assessment   Confirmed/corrected address, phone number and insurance Yes   Confirmed Demographics Correct on Facesheet   Source of Information patient   When was your last doctors appointment?   (per pt 1 year)   Communicated DENNIS with patient/caregiver Yes   Reason For Admission Acute on chronic diastolic congestive heart failure   People in Home spouse   Facility Arrived From: home   Do you expect to return to your current living situation? No   Do you have help at home or someone to help you manage your care at home? No   Prior to hospitilization cognitive status: Alert/Oriented   Current cognitive status: Alert/Oriented   Walking or Climbing Stairs ambulation difficulty, requires equipment   Dressing/Bathing bathing difficulty, requires equipment   Home Layout Able to live on 1st floor   Equipment Currently Used at Home walker, rolling;wheelchair   Readmission within 30 days? No   Patient currently being followed by outpatient case management? No   Do you currently have service(s) that help you manage your care at home? No   Do you take prescription medications? Yes   Do you have prescription coverage? Yes   Coverage PHN   Do you have any problems affording any of your prescribed medications? No   Who is going to help you get home at discharge? wife Chelsea [phone]   How do you get to doctors appointments? car, drives  self;family or friend will provide   Are you on dialysis? No   Do you take coumadin? No   Discharge Plan A Home with family   DME Needed Upon Discharge  none   Discharge Plan discussed with: Patient   Transition of Care Barriers None   OTHER   Name(s) of People in Home wife Chelsea [phone]

## 2023-05-26 NOTE — PLAN OF CARE
Problem: Adult Inpatient Plan of Care  Goal: Plan of Care Review  Outcome: Ongoing, Progressing    VN cued into room to complete admit assessment. VIP model introduced; VN working alongside bedside treatment team.  Plan of care reviewed with patient. Patient informed of fall risk, fall precautions, call light within reach, side rails x2 elevated. Patient notified to ask staff for assistance. Patient verbalized complete understanding. Time allowed for questions. Will continue to monitor and intervene as needed. Chart reviewed

## 2023-05-26 NOTE — PHARMACY MED REC
"Admission Medication History     The home medication history was taken by Socorro Davidson CPhT.    Medication history obtained from, Patient Verified    You may go to "Admission" then "Reconcile Home Medications" tabs to review and/or act upon these items.     The home medication list has been updated by the Pharmacy department.   Please read ALL comments highlighted in yellow.   Please address this information as you see fit.    Feel free to contact us if you have any questions or require assistance.          Socorro Davidson CPhT.  Ext [phone]               .        "

## 2023-05-26 NOTE — H&P
[facility] Internal Medicine H&P    Admitting Team: [facility] Hospital Medicine A  Attending Physician: Raquel  Resident: Lico  Intern: Alessandro    Date of Admit: 5/25/2023    Chief Complaint     Shortness of breath for 4 days and 7 lb weight gain for 1 day    Subjective:      History of Present Illness:  Alexander Kelly is a 83 y.o. male with PMHx of HFpEF, HTN, COPD, bilateral hearing loss, alcoholism, obesity, cirrhosis (per daughter), BPH  presents to [facility] on 5/25/23 with worsening SOB for 4 days and 7 lb weight gain overnight.      Pt was in his usual state of health until 2 weeks ago when he could not make it across the street to his neighbor's house without becoming very short of breath. His shortness of breath worsened 4 days ago, and he noticed a decrease in his urine output during this time. He reports weight gain and a noticeable increase in abdominal girth. He has been taking his Bumex daily. His wife at bedside reports pt has been wheezing more, as well as a cough for 2 months with occasional yellow sputum. He uses Symbicort daily as instructed, has not had to use his PRN albuterol. Denies any fever, chest pain. He normally sleep upright with many pillows at home. He has chronic bilateral leg lymphedema, denies worsening lower extremity edema. He uses a lymphedema pump nightly. He drinks ~40oz (1200 mL) of water a day but has additional beverages such as coffee and green tea.      At baseline, he is able to walk around leaning over a shopping cart in [facility] for 2 hours without SOB. He is able to dress himself and take care of daily needs without assistance. He tries to eat a low sodium diet, but he and his wife say they have deviated from their diet lately. He recently had crawfish and other saltier foods. He drinks three 12 oz beers nightly.      At bed side, pt had to urinate and c/o of dysuria when initiating urination. His wife said he complains of dysuria at home, too. He takes Saw  "[place] for urinary hesitancy symptoms at home.     Past Medical History:  HFpEF  HTN  COPD  Bilateral hearing loss  Alcohol use disorder, never withdrawn, cut back from 12 beers daily to 3    Obesity   BLE lymphedema   Venous insufficiency   ?Cirrhosis     Past Surgical History:  none    Allergies:  Review of patient's allergies indicates:  No Known Allergies    Home Medications:  Symbicort BID   Albuterol PRN   Bumex 1mg daily   KCL 20meq daily   Valsartan 320mg daily  Saw [place] capsule daily     Family History:  Noncontributory     Social History:  Tobacco: smoked for 15 years, quit 30 years ago  ETOH: prior heavy alcohol use, 12 pack beer per day now 3 12oz beers/day  Rec drugs: denies    Review of Systems:  Pertinent items are noted in HPI. All other systems reviewed and are negative.    Health Maintaince :   Primary Care Physician: Ata Vines MD    Immunizations:   TDap 2021  Flu due next season  Pna PC13 x 2, PPSV23 x1  COVID x2    Cancer Screening:  Colonoscopy is not up to date.      Objective:   Last 24 Hour Vital Signs:  BP  Min: 133/63  Max: 157/73  Temp  Av.1 °F (36.7 °C)  Min: 98.1 °F (36.7 °C)  Max: 98.1 °F (36.7 °C)  Pulse  Av.8  Min: 71  Max: 81  Resp  Av  Min: 20  Max: 26  SpO2  Av.8 %  Min: 94 %  Max: 96 %  Height  Av' 11" (180.3 cm)  Min: 5' 11" (180.3 cm)  Max: 5' 11" (180.3 cm)  Weight  Av.3 kg (225 lb 7.2 oz)  Min: 100.2 kg (220 lb 14.4 oz)  Max: 104.3 kg (230 lb)  Body mass index is 30.81 kg/m².  I/O last 3 completed shifts:  In: -   Out: 900 [Urine:900]    Physical Examination:  General:  Well-developed, well-nourished in no apparent distress  HEENT:  Normocephalic, atraumatic, PEERLA , no discharge from either eye, no scleral icterus, external ears with no discharge, oropharynx is clear and moist; lateral right eye with yellow smooth growth; actinic keratoses on scalp  Neck: supple, normal range of motion, no thyromegaly present, no cervical LAD, " JVP ~10  Respiratory: diffuse wheezing bilaterally, decreased breath sounds in lower lung fields, decreased fremitus on left lung, symmetrical chest expansion with no increased work of breathing on room air  Cardiovascular: Normal rate, regular rhythm and normal heart sounds with no murmurs, rubs, or gallops, pulses 2+ and symmetric throughout  Abdominal: protuberant abdomen, non-tender, umbilical and supraumbilical hernias  Musculoskeletal: Normal range of motion in all extremities  Neurological: AAO x 4, CN 2-12 grossly intact  Extremities and Skin: Skin is warm and dry, bilateral lower extremity edema with clean sores on left anterior shin; bilateral pitting edema  Psychiatric: Normal mood and affect, normal behavior    Bedside US: mild/moderate pericardial effusion, EF > 50% no wall motion abnormalities, minimal B lines, no free fluid in Hicks's pouch     Laboratory:  Recent Results (from the past 24 hour(s))   CBC auto differential    Collection Time: 05/25/23  4:16 PM   Result Value Ref Range    WBC 10.09 3.90 - 12.70 K/uL    RBC 4.44 (L) 4.60 - 6.20 M/uL    Hemoglobin 13.2 (L) 14.0 - 18.0 g/dL    Hematocrit 38.7 (L) 40.0 - 54.0 %    MCV 87 82 - 98 fL    MCH 29.7 27.0 - 31.0 pg    MCHC 34.1 32.0 - 36.0 g/dL    RDW 13.9 11.5 - 14.5 %    Platelets 285 150 - 450 K/uL    MPV 9.7 9.2 - 12.9 fL    Immature Granulocytes 0.7 (H) 0.0 - 0.5 %    Gran # (ANC) 7.8 (H) 1.8 - 7.7 K/uL    Immature Grans (Abs) 0.07 (H) 0.00 - 0.04 K/uL    Lymph # 0.8 (L) 1.0 - 4.8 K/uL    Mono # 1.4 (H) 0.3 - 1.0 K/uL    Eos # 0.0 0.0 - 0.5 K/uL    Baso # 0.01 0.00 - 0.20 K/uL    nRBC 0 0 /100 WBC    Gran % 77.7 (H) 38.0 - 73.0 %    Lymph % 7.8 (L) 18.0 - 48.0 %    Mono % 13.4 4.0 - 15.0 %    Eosinophil % 0.3 0.0 - 8.0 %    Basophil % 0.1 0.0 - 1.9 %    Differential Method Automated    Comprehensive metabolic panel    Collection Time: 05/25/23  4:16 PM   Result Value Ref Range    Sodium 121 (L) 136 - 145 mmol/L    Potassium 5.1 3.5 - 5.1  mmol/L    Chloride 92 (L) 95 - 110 mmol/L    CO2 24 23 - 29 mmol/L    Glucose 127 (H) 70 - 110 mg/dL    BUN 13 8 - 23 mg/dL    Creatinine 0.8 0.5 - 1.4 mg/dL    Calcium 8.9 8.7 - 10.5 mg/dL    Total Protein 6.6 6.0 - 8.4 g/dL    Albumin 3.4 (L) 3.5 - 5.2 g/dL    Total Bilirubin 1.3 (H) 0.1 - 1.0 mg/dL    Alkaline Phosphatase 114 55 - 135 U/L    AST 25 10 - 40 U/L    ALT 29 10 - 44 U/L    Anion Gap 5 (L) 8 - 16 mmol/L    eGFR >60 >60 mL/min/1.73 m^2   Troponin I    Collection Time: 05/25/23  4:16 PM   Result Value Ref Range    Troponin I <0.006 0.000 - 0.026 ng/mL   Brain natriuretic peptide    Collection Time: 05/25/23  4:16 PM   Result Value Ref Range     (H) 0 - 99 pg/mL   Basic metabolic panel    Collection Time: 05/25/23  9:21 PM   Result Value Ref Range    Sodium 124 (L) 136 - 145 mmol/L    Potassium 4.2 3.5 - 5.1 mmol/L    Chloride 89 (L) 95 - 110 mmol/L    CO2 29 23 - 29 mmol/L    Glucose 111 (H) 70 - 110 mg/dL    BUN 16 8 - 23 mg/dL    Creatinine 0.9 0.5 - 1.4 mg/dL    Calcium 9.2 8.7 - 10.5 mg/dL    Anion Gap 6 (L) 8 - 16 mmol/L    eGFR >60 >60 mL/min/1.73 m^2   TSH    Collection Time: 05/25/23  9:21 PM   Result Value Ref Range    TSH 0.581 0.400 - 4.000 uIU/mL     Microbiology Data: none    Other Results:  EKG (my interpretation): Sinus rhythm, J point elevation in anterior leads     Radiology:  X-Ray Chest AP Portable 5/25/2023    1. Pulmonary findings suggest edema with bilateral pleural effusions, left greater than right.  Developing left lower lung zone consolidation not excluded. Electronically signed by: Bart Reeves MD Date:       Assessment:     Alexander Kelly is a 83 y.o. male with PMHx of HFpEF, HTN, COPD, bilateral hearing loss, alcoholism, obesity, cirrhosis (per daughter), BPH  presents to [facility] on 5/25/23 with worsening SOB for 4 days and 7 lb weight gain overnight. CXR with bilateral pleural effusions, elevated BNP consistent with acute on chronic HFpEF  exacerbation. Admit for IV diuresis. Hyponatremic below baseline, likely due to excess volume.      Plan:     Acute on chronic diastolic heart failure exacerbation   - last ECHO 2/22 with EF 65% and grade 2 LV diastolic dysfunction, bi atrial enlargement, pulm HTN  - follow with Dr. Healy, cardiology   -   - repeat ECHO   - s/p 80mg IV lasix in ED, follow urine output and dose intermittently   - strict ins & outs   - 1.5L fluid restriction, cardiac diet     Bilateral pleural effusions L>R  - reassess after diuresis; may be 2/2 volume overload but if not improving, will work up further    Pericardial effusion  - noted on prior TTE as well as bedside echo  - no hemodynamic compromise, continue to monitor  - repeat TTE pending     Acute on chronic hyponatremia   Hypervolemic hyponatremia  - follow serial BMPs  - baseline Na likely ~132  - Na 121 on admit, improved to 124 with diuresis  - per chart review, no work up of hyponatremia   - TSH wnl  - acute component likely due to volume overload in the setting of acute on chronic HFpEF   - UA, urine Na, and Urine osm pending     COPD  - continue home symbicort&albuterol PRN   - wheezing likely due to pulmonary edema rather than COPD exacerbation    HTN  - per chart review patient on Valsartan 325 daily, however he believes he is still taking irbesartan 300 mg daily though this prescription looks to be discontinued  - will further clarify with patient prior to discharge     PAD with chronic lymphedema  Venous stasis dermatitis  - patient and family feel the size of his lower extremities is at baseline   - continue diuresis  - patient has lymphedema pumps at home     Elevated bilirubin, chronic  ?cirrhosis  - T bili 1.3 on admit, elevated on previous admissions  - long history of alcohol use   - his daughter says he was told he has cirrhosis but not documented  - no ascites on bedside US  - INR pending  - Abd US pending     Mild normocytic anemia   - possibly  dilutional in the setting of volume overload  - iron studies pending  - will continue to monitor     Diet: cardiac  DVT PPX: lovenox  Dispo: admit for diuresis    Code Status:   FULL    Liz Francois MD  [facility]-Pediatric[facility] HO-1  05/25/2023 5:19 PM]    [facility] Hospitalist Pager numbers:   [facility] Hospitalist Medicine Team A (Hakan/Raquel): [phone]  [facility] Hospitalist Medicine Team B (Franklyn/Rhett):  [phone]

## 2023-05-27 PROBLEM — I31.39 PERICARDIAL EFFUSION: Status: ACTIVE | Noted: 2023-01-01

## 2023-05-27 NOTE — PLAN OF CARE
05/27/23 1130   Final Note   Assessment Type Final Discharge Note   Anticipated Discharge Disposition Home   Hospital Resources/Appts/Education Provided Appointments scheduled and added to AVS   Post-Acute Status   Discharge Delays None known at this time     Patient has discharge transportation home

## 2023-05-27 NOTE — PROGRESS NOTES
"[facility]ospit[facility] Hospital Medicine Progress Note    Primary Team: hospitals Hospitalist Team A  Attending Physician: Anastasia García MD  Resident: Lico  Intern: Alessandro    Subjective:      Sitting up in bed eating breakfast this morning. Endorsing improvement in breathing and wheezing and with 2.5L urine output over last 24 hours. Is ready to go home today.     Objective:     Last 24 Hour Vital Signs:  BP  Min: 119/61  Max: 140/63  Temp  Av.1 °F (36.2 °C)  Min: 96.1 °F (35.6 °C)  Max: 98.1 °F (36.7 °C)  Pulse  Av  Min: 70  Max: 91  Resp  Av.5  Min: 17  Max: 20  SpO2  Av.6 %  Min: 91 %  Max: 97 %  Height  Av' 11" (180.3 cm)  Min: 5' 11" (180.3 cm)  Max: 5' 11" (180.3 cm)  Weight  Av.7 kg (221 lb 14.4 oz)  Min: 99.8 kg (220 lb)  Max: 101.5 kg (223 lb 12.8 oz)  I/O last 3 completed shifts:  In: 1400 [P.O.:1400]  Out: 3900 [Urine:3900]    Physical Examination:  General:  Well-developed, well-nourished in no apparent distress  HEENT:  Normocephalic, atraumatic, PEERLA , no discharge from either eye, no scleral icterus,  oropharynx is clear and moist; lateral right eye with yellow smooth growth; actinic keratoses on scalp  Neck: supple, normal range of motion, no thyromegaly present  Respiratory: mild wheezing bilaterally, decreased breath sounds in lower lung fields, symmetrical chest expansion with no increased work of breathing on room air  Cardiovascular: Normal rate, regular rhythm and normal heart sounds with no murmurs, rubs, or gallops, pulses 2+  Abdominal: protuberant abdomen, non-tender, umbilical and supraumbilical hernias  Musculoskeletal: Normal range of motion in all extremities  Neurological: AAO x 4, CN 2-12 grossly intact  Extremities and Skin: Skin is warm and dry, improving bilateral lower extremity edema with clean sores on left anterior shin  Psychiatric: Normal mood and affect, normal behavior    Laboratory:  Recent Labs   Lab 23  1616 231 23  0352   WBC 10.09  --  " 7.58   HGB 13.2*  --  13.3*   HCT 38.7*  --  39.3*     --  270   MCV 87  --  87   RDW 13.9  --  14.0   * 124* 128*   K 5.1 4.2 4.1   CL 92* 89* 90*   CO2 24 29 28   BUN 13 16 14   CREATININE 0.8 0.9 0.8   * 111* 98   PROT 6.6  --  6.9   ALBUMIN 3.4*  --  3.2*   BILITOT 1.3*  --  1.1*   AST 25  --  20   ALKPHOS 114  --  117   ALT 29  --  21       Other Results:  EKG (my interpretation): reviewed    Radiology Data Reviewed:   Pertinent Findings:  No new imaging    Current Medications:     Infusions:       Scheduled:   enoxparin  40 mg Subcutaneous Daily    fluticasone furoate-vilanteroL  1 puff Inhalation Daily    multivitamin  1 tablet Oral Daily    potassium chloride SA  20 mEq Oral Daily    predniSONE  40 mg Oral Daily    tamsulosin  0.4 mg Oral Daily    valsartan  320 mg Oral Daily        PRN:  albuterol, sodium chloride 0.9%      Assessment:     Alexander Kelly is a 83 y.o.male with  PMHx of HFpEF, HTN, COPD, bilateral hearing loss, alcoholism, obesity, cirrhosis (per daughter), BPH  presents to [facility] on 5/25/23 with worsening SOB for 4 days and 7 lb weight gain overnight. CXR with bilateral pleural effusions, elevated BNP consistent with acute on chronic HFpEF exacerbation. Admit for IV diuresis. Hyponatremic below baseline, likely due to excess volume. Wheezing on exam with concern for COPD exacerbation, so initiated duonebs and 5 day course of prednisone.     Plan:     Acute on chronic diastolic heart failure exacerbation   - last ECHO 2/22 with EF 65% and grade 2 LV diastolic dysfunction, bi atrial enlargement, pulm HTN  - follow with Dr. Healy, cardiology   -   - repeat ECHO   - s/p 80mg IV lasix in ED, follow urine output and dose intermittently   - strict ins & outs, daily weights  - 1.5L fluid restriction, cardiac diet   - 2.5L urine output last 24 hours with 2 doses of lasix 80 IV     Bilateral pleural effusions L>R  - reassess after diuresis; may be 2/2  volume overload but if not improving, will work up further     Pericardial effusion  - noted on prior TTE as well as bedside echo  - no hemodynamic compromise, continue to monitor  - repeat TTE showing small to moderate posterior pericardial effusion with EF 65% and indeterminate LV DD     Acute on chronic hyponatremia, improving  Hypervolemic hyponatremia, improving  - follow serial BMPs  - baseline Na likely ~132  - Na 121 on admit, improved to 124 with diuresis  - per chart review, no work up of hyponatremia   - TSH wnl  - acute component likely due to volume overload in the setting of acute on chronic HFpEF   - UA wnl, urine Na 73, and Urine osm 266     Acute on chronic COPD exacerbation   Diffuse wheezing  - continue home symbicort&albuterol PRN   - wheezing possibly due to pulmonary edema rather than COPD exacerbation however his lung sounds are more tight this morning with less air movement  - Duo Nebs x2 and 5 day prednisone course     Pulmonary HTN  - noted on echo  - will refer to pulmonary HTN clinic upon discharge    Aortic stenosis  - Echo 5/25/23 valve area 1.61; peak velocity 2.42m/s; mean gradient 13mmHg  - should be monitored with repeat echo     HTN  - per chart review patient on Valsartan 325 daily, however he believes he is still taking irbesartan 300 mg daily though this prescription looks to be discontinued  - will further clarify with patient prior to discharge   - restarted valsartan while inpatient      PAD with chronic lymphedema  Venous stasis dermatitis  - patient and family feel the size of his lower extremities is at baseline   - continue diuresis  - patient has lymphedema pumps at home      Elevated bilirubin, chronic  ?cirrhosis  - T bili 1.3 on admit, elevated on previous admissions  - long history of alcohol use   - his daughter says he was told he has cirrhosis but not documented  - no ascites on bedside US  - INR 1.1  - Abd US pending      Mild normocytic anemia   - possibly  dilutional in the setting of volume overload  - iron studies low Fe and TIBC, ferritin elevated at 341  - more consistent with AoCD  - will continue to monitor      Diet: cardiac  DVT PPX: lovenox  Dispo: pending continued diuresis and improvement in COPD exacerbation    Madyson Sherman MD  [facility] PGY 1    [facility] Hospitalist Pager numbers:   [facility] Hospitalist Medicine Team A (Hakan/Raquel): [phone]  [facility] Hospitalist Medicine Team B (Franklyn/Rhett):  [phone]

## 2023-05-27 NOTE — PLAN OF CARE
VN note: VN completed AVS and attachments and notified bedside nurse, Christo. Will cont to be available and intervene prn.

## 2023-05-27 NOTE — DISCHARGE SUMMARY
[facility] Internal Medicine Discharge Summary    Primary Team: [facility] Medicine A  Attending Physician: Anastasia García MD  Resident: Lico  Intern: Alessandro    Date of Admit: 5/25/2023  Date of Discharge:     Discharge to: Home   Condition: Stable    Discharge Diagnoses       Acute on chronic diastolic congestive heart failure    Venous stasis dermatitis of both lower extremities    Lymphedema of both lower extremities    Shortness of breath    Obesity (BMI 30-39.9)    Pulmonary hypertension    Pleural effusion    COPD (chronic obstructive pulmonary disease)    HTN (hypertension)    Venous insufficiency of both lower extremities    PAD (peripheral artery disease)    Volume overload    Pulmonary edema    Hyponatremia    Normocytic anemia    Hyperbilirubinemia    COPD exacerbation    Consultants and Procedures     Consultants: none  Procedures: none  Imaging:   TTE 5/26/23  The left ventricle is normal in size with concentric remodeling and normal systolic function.  The estimated ejection fraction is 65%.  Indeterminate left ventricular diastolic function.  Normal right ventricular size with normal right ventricular systolic function.  Mild aortic regurgitation.  Mild tricuspid regurgitation.  Moderate left atrial enlargement.  Moderate right atrial enlargement.  There is mild aortic valve stenosis.  Aortic valve area is 1.61 cm2; peak velocity is 2.42 m/s; mean gradient is 13 mmHg.  Intermediate central venous pressure (8 mmHg).  The estimated PA systolic pressure is 51 mmHg.  There is pulmonary hypertension.  Small to moderate posterior pericardial effusion.  The ascending aorta is mildly dilated.    CXR 5/25/23  The cardiomediastinal silhouette is not enlarged, magnified by technique noting calcification of the aorta..  There is obscuration of the bilateral costophrenic angles suggesting effusions, the effusion has improved somewhat on the left as compared to the previous exam..  The trachea is midline.  The lungs  are symmetrically expanded bilaterally with coarse interstitial attenuation bilaterally.  There is bilateral basilar subsegmental atelectasis..  Developing left lower lung zone consolidation not excluded.  There is no pneumothorax.  The osseous structures are remarkable for degenerative changes and osteopenia..     Impression:Pulmonary findings suggest edema with bilateral pleural effusions, left greater than right.  Developing left lower lung zone consolidation not excluded.    Abd US 5/25/23  Liver: Normal in size, measuring 15.9 cm. Homogeneous echotexture. No focal hepatic lesions.  Gallbladder: Multiple gallstones are seen.  There is no gallbladder wall thickening or pericholecystic fluid.  No sonographic Walker's sign.  Biliary system: The common duct is not dilated, measuring 2 mm.  No intrahepatic ductal dilatation.  Spleen: Normal in size and echotexture, measuring 11.2 cm.  Miscellaneous: No upper abdominal ascites.  Small-moderate right pleural effusion    Impression:Liver appears grossly within normal limits sonographically. Cholelithiasis without evidence of acute cholecystitis. Incidental right pleural effusion.    Brief History of Present Illness   Alexander Kelly is a 83 y.o. male with PMHx of HFpEF, HTN, COPD, bilateral hearing loss, alcoholism, obesity, cirrhosis (per daughter), BPH  presents to [facility] on 5/25/23 with worsening SOB for 4 days and 7 lb weight gain overnight.      Pt was in his usual state of health until 2 weeks ago when he could not make it across the street to his neighbor's house without becoming very short of breath. His shortness of breath worsened 4 days ago, and he noticed a decrease in his urine output during this time. He reports weight gain and a noticeable increase in abdominal girth. He has been taking his Bumex daily. His wife at bedside reports pt has been wheezing more, as well as a cough for 2 months with occasional yellow sputum. He uses  Symbicort daily as instructed, has not had to use his PRN albuterol. Denies any fever, chest pain. He normally sleepd upright with many pillows at home. He has chronic bilateral leg lymphedema, denies worsening lower extremity edema. He uses a lymphedema pump nightly. He drinks ~40oz (1200 mL) of water a day but has additional beverages such as coffee and green tea.   He tries to eat a low sodium diet, but he and his wife say they have deviated from their diet lately. He recently had crawfish and other saltier foods. He drinks three 12 oz beers nightly.   At bed side, pt had to urinate and c/o of dysuria when initiating urination. His wife said he complains of dysuria at home, too. He takes Saw Palmetto for urinary hesitancy symptoms at home.     For the full HPI please refer to the History & Physical from this admission.    Hospital Course By Problem with Pertinent Findings     Acute on chronic diastolic heart failure exacerbation   - , volume overloaded on exam & bedside US  - diuresed well with intermittent IV lasix with improvement in dyspnea  - repeat ECHO as above     Bilateral pleural effusions L>R  - likely 2/2 volume overload but if not improving, will work up further     Pericardial effusion  - noted on current and prior TTE, likely 2/2 volume overload  - no hemodynamic compromise     Acute on chronic hyponatremia, improving  Hypervolemic hyponatremia, improving  - baseline Na likely ~132  - Na 121 on admit, improved to 124 with diuresis  - per chart review, no work up of chronic hyponatremia   - TSH wnl  - acute component likely due to volume overload in the setting of acute on chronic HFpEF   - UA wnl, urine Na 73, and Urine osm 266     Acute on chronic COPD exacerbation   Diffuse wheezing  - pulm edema wheezing vs COPD exacerbation, will treat for COPD   - Duo Nebs q4hr and 5 day prednisone course     Pulmonary HTN  - noted on echo  - will refer to pulmonary HTN clinic upon discharge     Aortic  stenosis  - Echo 5/25/23 valve area 1.61; peak velocity 2.42m/s; mean gradient 13mmHg  - should be monitored with repeat echo     HTN  -  continue home valsartan 325 daily     PAD with chronic lymphedema  Venous stasis dermatitis  - patient and family feel the size of his lower extremities is at baseline   - resume lymphedema pumps at home      Elevated bilirubin, chronic  ?cirrhosis  - T bili 1.3 on admit, elevated on previous admissions; INR 1.1, albumin 3.4  - long history of alcohol use   - family reports being told he has cirrhosis in past, not documented in chart  - Abd US with normal liver, no ascites     Mild normocytic anemia   - possibly dilutional in the setting of volume overload  - follow outpt    Discharge Medications        Medication List        START taking these medications      predniSONE 20 MG tablet  Commonly known as: DELTASONE  Take 2 tablets (40 mg total) by mouth once daily. for 3 days  Start taking on: May 28, 2023     tamsulosin 0.4 mg Cap  Commonly known as: FLOMAX  Take 1 capsule (0.4 mg total) by mouth once daily.            CONTINUE taking these medications      albuterol 90 mcg/actuation inhaler  Commonly known as: VENTOLIN HFA  Inhale 2 puffs into the lungs every 6 (six) hours as needed for Wheezing. Rescue     ammonium lactate 12 % Crea  Apply 1 g topically 2 (two) times daily.     budesonide-formoterol 160-4.5 mcg 160-4.5 mcg/actuation Hfaa  Commonly known as: SYMBICORT  Inhale 2 puffs into the lungs every 12 (twelve) hours.     bumetanide 1 MG tablet  Commonly known as: BUMEX  Take 1 tablet (1 mg total) by mouth once daily.     diphenhydrAMINE 25 mg capsule  Commonly known as: BENADRYL     ONE-A-DAY MEN'S 50 PLUS(VIT K) 400- mcg Tab  Generic drug: multivit-min-folic-vit K-lycop     potassium chloride SA 20 MEQ tablet  Commonly known as: K-DUR,KLOR-CON  Take 1 tablet (20 mEq total) by mouth once daily.     saw palmetto 500 MG capsule     serine (bulk) 100 % Aline     valsartan  320 MG tablet  Commonly known as: DIOVAN  Take 1 tablet (320 mg total) by mouth once daily.     vitamin D 1000 units Tab  Commonly known as: VITAMIN D3               Where to Get Your Medications        These medications were sent to [facility] [address], [place] [id number]      Hours: Mon-Fri, 8a-5:30p Phone: [phone]   predniSONE 20 MG tablet  tamsulosin 0.4 mg Cap       Discharge Information:   Diet: Cardiac    Physical Activity: As tolerated.    Instructions:  1. Take all medications as prescribed  2. Keep all follow-up appointments  3. Return to the hospital or call your primary care physicians if any worsening symptoms occur.    Follow-Up Appointments:  Future Appointments   Date Time Provider Department Center   6/1/2023  1:00 PM Ata Vines MD [facility] [place] [facility]      Follow-up Information       Ata Vines MD Follow up on 6/1/2023.    Specialty: Family Medicine  Why: SCHED PCP w/Dr Vines on 6/1 at 1:00  Contact information:  [address]  [address]  [place] [address]  [phone]                           Madyson Sherman MD  \[facility]\"" Internal Medicine HO-1

## 2023-05-27 NOTE — PLAN OF CARE
AVS and educational attachments shared with patient and grandson via [employer]. Discussed thoroughly. Patient and grandson verbalized clear understanding using teach back method. Notified bedside nurse of completion of education. At present no distress noted. Patient to be discharged via w/c with escort service and family with all of patient's belonings. Will cont to be available to patient and family and intervene prn.

## 2023-06-01 NOTE — TELEPHONE ENCOUNTER
Wife noted that patient was recently on Breo during a hospital stay and did better on that than symbicort. I do not see that it was prescribed recently. Notified wife she should contact patient's PCP for refill if needed. Voiced understanding.

## 2023-06-01 NOTE — TELEPHONE ENCOUNTER
----- Message from Kat Dillon sent at 6/1/2023 10:28 AM CDT -----  Regarding: Refill  [facility] [phone] pt wife requesting refill on his Breo 200-25 mcg     [id number] - [place], [place] - [address]   Phone:  [phone]  Fax:  [phone]      Thanks

## 2023-07-04 PROBLEM — I50.9 ACUTE ON CHRONIC CONGESTIVE HEART FAILURE: Status: ACTIVE | Noted: 2023-01-01

## 2023-07-04 PROBLEM — R13.13 PHARYNGEAL DYSPHAGIA: Status: ACTIVE | Noted: 2023-01-01

## 2023-07-04 NOTE — Clinical Note
Diagnosis: Acute on chronic congestive heart failure, unspecified heart failure type [[id number]]   Future Attending Provider: YAW ANGULO [[id number]]   Is the patient being sent to ED Observation?: No   Admitting Provider:: YAW ANGULO [[id number]]   Special Needs:: No Special Needs [1]

## 2023-07-04 NOTE — H&P
[facility] Medicine H&P Note     Admitting Team: [facility] Hospitalist Team A  Attending Physician: Anastasia García MD  Intern: Rajni    Date of Admit: 7/4/2023    Chief Complaint     Shortness of Breath x 5 days  Subjective:      History of Present Illness:  Alexander Kelly is a 83 y.o. male with past medical history of HEpEF, COPD (not on O2 therapy, ex smoker), Pulmonary hypertension, chronic alcoholism, chronic hyponatremia and Hypertension.    The patient presented to [facility] on 7/4/2023 with a primary complaint of Shortness of Breath and gained 5-7 lbs in the last week.    The patient was in their usual state of health until more than 1 week ago, and had worsening of his SOB with minimal exertion (walking to the toilet). Gained 5-7 Ibs weight (from 219 -> 225) since the past week especially around his abdomen and lower extremities. The patient is taking his medications as prescribed (diuretics and his Symbicort inhaler). The patient says that this presentation is similar to his previous admission on 5/25/2023, where he was admitted as a case of acute on chronic HF exacerbation, COPD exacerbation.  He noticed clear runny nose since 1 week, but was not associated with any fever, increase coughing, sputum production, bloody sputum  He bikes for 2 hr per day to help with his venous insufficiency and lymphedema, and complained with multiple episodes of choking while eating solid food and contributed it to talking while chewing his food, this never occurred while drinking any fluids and is not associated with any pain while swallowing, vomiting, and denies any mouth ulcers.  No history of abdominal pain, chest pain, dysuria, change in bowel habits, contact with sick ppl    Past Medical History:  Acute on chronic diastolic congestive heart failure  COPD (chronic obstructive pulmonary disease)  PAD (peripheral artery disease)  Hypertension  Bilateral hearing loss  Venous insufficiency (venostasis  dermatitis)  Lymphedema of both lower extremities  Obesity  Gallbladder stones  Hyperbilirubinemia  Pulmonary hypertension  Pleural effusion  Hyperbilirubinemia    Past Surgical History:  Appendectomy when he was 14 years-old    Allergies:  Review of patient's allergies indicates:  No Known Allergies    Home Medications:  Current Outpatient Medications   Medication Instructions    APPLE CIDER VINEGAR ORAL Oral    budesonide-formoterol 160-4.5 mcg (SYMBICORT) 160-4.5 mcg/actuation HFAA 2 puffs, Inhalation, Every 12 hours    bumetanide (BUMEX) 1 mg, Oral, Daily    cetirizine (ZYRTEC) 10 mg, Oral, Daily    diphenhydrAMINE (BENADRYL) 25 mg, Oral, Nightly PRN    emollient combination no.71 (LUBRIDERM ADVANCED THERAPY TOP) Topical (Top), 2 times daily    multivit-min-folic-vit K-lycop (ONE-A-DAY MEN'S 50 PLUS,VIT K,) 400- mcg Tab 1 tablet, Oral, Daily    potassium chloride SA (K-DUR,KLOR-CON) 20 MEQ tablet 20 mEq, Oral, Daily    saw palmetto 500 mg, Oral, Daily    serine, bulk, 100 % Aline 1 capsule, Oral, Daily    valsartan (DIOVAN) 320 MG tablet TAKE ONE TABLET BY MOUTH ONCE DAILY.    vitamin D (VITAMIN D3) 1,000 Units, Oral, Daily      Family History:  Family History   Problem Relation    COPD, heart disease and alcoholism Mother    COPD, heart disease and alcoholism Father       Social History:  Social History     Tobacco Use    Smoking status: Stopped smoking (used to smoke 1-1.5 pack/day for around 17 years)   - Alcohol intake: drinks 6-12 beers since the age of 16, but after his previous admission he  his intake to 2 beer/day  Lives with his family I Work: retired    Review of Systems:  Pertinent items are noted in HPI. All other systems are reviewed and are negative.    Health Maintaince :   Primary Care Physician: Ata Vnies  Colonoscopy done more than 15 years back, normal results    Objective:   Last 24 Hour Vital Signs:  BP  Min: 167/78  Max: 169/79  Temp  Av.3 °F (36.8 °C)  Min: 98.3 °F  (36.8 °C)  Max: 98.3 °F (36.8 °C)  Pulse  Av  Min: 78  Max: 90  Resp  Av.3  Min: 22  Max: 23  SpO2  Av.3 %  Min: 94 %  Max: 98 %  Weight  Av.1 kg (225 lb)  Min: 102.1 kg (225 lb)  Max: 102.1 kg (225 lb)  Body mass index is 31.38 kg/m².  No intake/output data recorded.    Physical Examination:  Examination  General: sitting comfortably in bed  Head: normocephalic, atraumatic  Neck: No thyromegaly or masses   Cardiac: RRR, an ejection systolic murmur was appreciated on the Rt 2nd intercostal space radiating to the neck was appreciated, no extra heart sounds  Chest: no respiratory distress, no wheezing, symmetric chest rise, no crackles, decreased breath sounds to bilateral lung bases  GI: distended, non tender, normoactive bowel sounds, a paraumbilical hernial was appreciated, but no organomegaly appreciated, no shifting dullness was appreciated  Extremities: BLE pitting edema till the bilateral knees, stasis dermatitis, cool to touch BLE, no clubbing, or cyanosis  Lymph nodes: no palpable cervical lymph nodes  Skin: dry, warm, intact. No bruising or rashes.  Neuro: Alert and oriented, moving all extremities, sensation equal and symmetric     Laboratory:  Most Recent Data:  CBC:   Lab Results   Component Value Date    WBC 9.48 2023    HGB 13.4 (L) 2023    HCT 38.6 (L) 2023     2023    MCV 88 2023    RDW 13.6 2023     BMP:   Lab Results   Component Value Date     (L) 2023    K 5.2 (H) 2023    CL 89 (L) 2023    CO2 23 2023    BUN 11 2023    CREATININE 0.7 2023     (H) 2023    CALCIUM 8.9 2023    MG 1.8 2023    PHOS 3.2 2023     LFTs:   Lab Results   Component Value Date    PROT 6.8 2023    ALBUMIN 3.5 2023    BILITOT 0.9 2023    AST 27 2023    ALKPHOS 160 (H) 2023    ALT 23 2023     Coags:   Lab Results   Component Value Date    INR 1.1 2023      FLP:   Lab Results   Component Value Date    CHOL 143 02/08/2023    HDL 74 02/08/2023    LDLCALC 58 02/08/2023    TRIG 37 02/08/2023    CHOLHDL 1.9 02/08/2023     DM:   Lab Results   Component Value Date    LDLCALC 58 02/08/2023    CREATININE 0.7 07/04/2023     Thyroid:   Lab Results   Component Value Date    TSH 0.581 05/25/2023     Anemia:   Lab Results   Component Value Date    IRON 20 (L) 05/26/2023    TIBC 281 05/26/2023    FERRITIN 341 (H) 05/26/2023     Cardiac:   Lab Results   Component Value Date    TROPONINI 0.012 07/04/2023     (H) 07/04/2023     Urinalysis:   Lab Results   Component Value Date    COLORU Colorless (A) 05/25/2023    SPECGRAV 1.005 05/25/2023    NITRITE Negative 05/25/2023    KETONESU Negative 05/25/2023    UROBILINOGEN Negative 05/25/2023     Other Results:  EKG (my interpretation): normal rate, normal sinus rhythm, low voltage QRS, no acute ST or T wave changes, artifact throughout.    Radiology:  Imaging Results              X-Ray Chest AP Portable (Final result)  Result time 07/04/23 12:41:44      Final result by Michael Cortez MD (07/04/23 12:41:44)                   Impression:      Cardiomegaly with bilateral pleural effusions.      Electronically signed by: Michael Cortez MD  Date:    07/04/2023  Time:    12:41               Narrative:    EXAMINATION:  XR CHEST AP PORTABLE    CLINICAL HISTORY:  Shortness of breath;    TECHNIQUE:  Single frontal view of the chest was performed.    COMPARISON:  05/25/2023    FINDINGS:  The lungs are clear with normal appearance of pulmonary vasculature. Bilateral pleural effusions.  No evident pneumothorax.    The cardiac silhouette is enlarged.  The hilar and mediastinal contours are unremarkable.    Bones are intact.                                       Assessment:     Alexander Kelly is a 83 y.o. male with:  Patient Active Problem List    Diagnosis Date Noted    Pericardial effusion 05/27/2023    Hyperbilirubinemia 05/26/2023    COPD  exacerbation 05/26/2023    Volume overload 05/25/2023    Pulmonary edema 05/25/2023    Hyponatremia 05/25/2023    Normocytic anemia 05/25/2023    PAD (peripheral artery disease) 02/15/2023    Venous insufficiency of both lower extremities 06/27/2022    HTN (hypertension) 04/11/2022    Weakness 02/16/2022    COPD (chronic obstructive pulmonary disease) 02/15/2022    Acute on chronic diastolic congestive heart failure 02/14/2022    Pulmonary hypertension 02/14/2022    Pleural effusion 02/14/2022    Venous stasis dermatitis of both lower extremities 02/09/2022    Lymphedema of both lower extremities 02/09/2022    Shortness of breath 02/09/2022    Obesity (BMI 30-39.9) 02/09/2022        Plan:     Acute on chronic diastolic heart failure exacerbation   - , volume overloaded on exam and CXR  - ECHO done on 5/26/2023: EF 65%, left ventricle is normal in size, Moderate left/right atrial enlargement, mild aortic valve stenosis, estimated PA systolic pressure is 51 mmHg, there is pulmonary hypertension, small to moderate posterior pericardial effusion.  - given 2 mg Bumex IV in the ED, with positive urine output  - will continue IV diuresis overnight with strict monitoring of I /O  - initial troponin 0.012, will follow 2nd troponin  - EKG: NSR  - daily weight measurements when possible  - cardiac diet with fluid restriction     Bilateral pleural effusions L>R  - likely 2/2 volume overload (more likely transudative effusion, secondary to chronic heart failure) but if not improving, will work up further  - SpO2 96% on 2 L  - continue diuresis as above  - compared to the previous CXR, stable course    Pericardial effusion  - mild to moderate effusion noted on bedside and prior TTE, likely 2/2 volume overload  - continue dieresis as above  - no hemodynamic compromise     Acute on chronic hyponatremia  Hypervolemic hyponatremia  - baseline Na likely ~132  - Na 123 on admit  - per chart review, no work up of chronic  hyponatremia   - TSH wnl on the previous admission  - acute component likely due to volume overload in the setting of acute on chronic HFpE  - long history of alcohol use     Hyperkalemia:  - hold K supplementations, and will likely discontinue it  - On ARB (Valsartan  325 daily), for HTN     Acute on chronic COPD exacerbation, stable  - pulm edema wheezing vs COPD exacerbation, will treat for COPD   - Continue Symbicort inhaler  - keep SpO2 between 88-94%,     Pulmonary HTN  - will refer to pulmonary HTN clinic upon discharge     HTN  -  continue home valsartan 325 daily     PAD with chronic lymphedema  Venous stasis dermatitis  - patient and family feel the size of his lower extremities is at baseline   - resume lymphedema pumps at home      Elevated bilirubin, chronic  - T bili 0.9 on admit, elevated on previous admissions; INR 1.1, albumin 3.5  - long history of alcohol use   - Abd US with normal liver, no ascites on 5/25/23     Mild normocytic anemia   - Hb on admission 13.4, MCV 89  - possibly dilutional in the setting of volume overload  - follow outpt      Code Status: Full  DVT Prophylaxis: Enoxaparin  Diet: Cardiac diet  Disposition:pending diuresis anticipate discharge tomorrow    Esepranza Urban MD  [employer] Intern    [facility] Medicine Hospitalist Pager numbers:   [employer] Hospitalist Medicine Team A (Hakan/Raquel): [phone]  [facility] Hospitalist Medicine Team B (Franklyn/Rhett):  [phone]

## 2023-07-04 NOTE — PHARMACY MED REC
"[facility]           Pharmacy  Admission Medication History     The home medication history was taken by Windy Beltrán.      Medication history obtained from Medications listed below were obtained from: Patient/family    Based on information gathered for medication list, you may go to "Admission" then "Reconcile Home Medications" tabs to review and/or act upon those items.     The home medication list has been updated by the Pharmacy department.   Please read ALL comments highlighted in yellow.   Please address this information as you see fit.    Feel free to contact us if you have any questions or require assistance.    The medications listed below were removed from the home medication list.  Please reorder if appropriate:    Patient reports NOT TAKING the following medication(s):  Albuterol hfa  Ammonium lactate cream        No current facility-administered medications on file prior to encounter.     Current Outpatient Medications on File Prior to Encounter   Medication Sig Dispense Refill    APPLE CIDER VINEGAR ORAL Take by mouth.      budesonide-formoterol 160-4.5 mcg (SYMBICORT) 160-4.5 mcg/actuation HFAA Inhale 2 puffs into the lungs every 12 (twelve) hours. 1 g 3    bumetanide (BUMEX) 1 MG tablet Take 1 tablet (1 mg total) by mouth once daily. 90 tablet 3    cetirizine (ZYRTEC) 10 MG tablet Take 10 mg by mouth once daily.      diphenhydrAMINE (BENADRYL) 25 mg capsule Take 25 mg by mouth nightly as needed for Itching.      emollient combination no.71 (LUBRIDERM ADVANCED THERAPY TOP) Apply topically 2 (two) times a day.      multivit-min-folic-vit K-lycop (ONE-A-DAY MEN'S 50 PLUS,VIT K,) 400- mcg Tab Take 1 tablet by mouth once daily.      potassium chloride SA (K-DUR,KLOR-CON) 20 MEQ tablet Take 1 tablet (20 mEq total) by mouth once daily. 90 tablet 3    saw palmetto 500 MG capsule Take 500 mg by mouth once daily.      serine, bulk, 100 % Aline Take 1 capsule by mouth once daily.   "    valsartan (DIOVAN) 320 MG tablet TAKE ONE TABLET BY MOUTH ONCE DAILY. (Patient taking differently: Take 320 mg by mouth once daily.) 90 tablet 3    vitamin D (VITAMIN D3) 1000 units Tab Take 1,000 Units by mouth once daily.      tamsulosin (FLOMAX) 0.4 mg Cap Take 1 capsule (0.4 mg total) by mouth once daily. (Patient not taking: Reported on 7/4/2023) 30 capsule 11       Please address this information as you see fit.  Feel free to contact us if you have any questions or require assistance.    Windy Beltrán  [phone]                  .

## 2023-07-04 NOTE — ED NOTES
Pt arrive from home complains of Shortness of Breath. Pt report worsening sob X4 days. Pt has Hx. Copd & CHF.  Mild visible dyspnea. Family report weight gain 5-7 pounds over the last week.

## 2023-07-04 NOTE — PROGRESS NOTES
07/04/23 1604   Admission   Initial VN Admission Questions Complete   Communication Issues? None   Shift   Virtual Nurse - Rounding Complete   Virtual Nurse - Patient Verbalized Approval Of Camera Use   Type of Frequent Check   Type Patient Rounds   Safety/Activity   Patient Rounds bed in low position;bed wheels locked;call light in patient/parent reach;clutter free environment maintained;placement of personal items at bedside;visualized patient   Safety Promotion/Fall Prevention family to remain at bedside   Safety Precautions emergency equipment at bedside   Activity Management Arm raise - L1   Activity Assistance Provided assistance, 1 person   Positioning   Body Position supine   Head of Bed (HOB) Positioning HOB at 30-45 degrees   Positioning/Transfer Devices pillows;in use   Pain/Comfort/Sleep   Comfort/Acceptable Pain Level 1   Pain Rating (0-10): Rest 0       VN cued into room and permission given to adjust camera towards patient.  Patient is resting in bed with family members in room.  Denies pain.  Does not appear to be in any apparent distress.  Admission questions completed. Plan of care reviewed.   Instructed to call for any assistance and patient verbalized understanding.  Safety is maintained with bed at the lowest, wheels locked and side rails up.  Call light within reach.  Will continue to monitor.

## 2023-07-04 NOTE — ED PROVIDER NOTES
Encounter Date: 7/4/2023       History     Chief Complaint   Patient presents with    Shortness of Breath     Worsening sob x3-4 days. Hx. Copd/chf. Mild visible dyspnea. 5-7lb weight gain in the last week.      83-year-old male brought to the emergency department by family for evaluation of shortness of breath.  Onset a few days ago.  Family notes it was actually worse 2 days ago but is persistent nonetheless.  Symptoms began 4 5 days ago.  Notes progressive shortness of breath, elicited and exacerbated with exertion, somewhat better with rest.  Patient does admit to a mild, nonproductive cough.  Denies any fever or chest pain.  Reports some abdominal swelling.  States he is gained several lb since he was discharged 5 or 6 weeks ago for similar symptoms.  No other symptoms reported at this time.    Review of patient's allergies indicates:  No Known Allergies  Past Medical History:   Diagnosis Date    Bilateral hearing loss     Hypertension      History reviewed. No pertinent surgical history.  Family History   Problem Relation Age of Onset    No Known Problems Mother     No Known Problems Father      Social History     Tobacco Use    Smoking status: Never    Smokeless tobacco: Never     Review of Systems   Constitutional:  Negative for chills and fever.   HENT:  Negative for congestion.    Respiratory:  Positive for cough and shortness of breath.    Cardiovascular:  Negative for chest pain.   Gastrointestinal:  Negative for abdominal pain.   Musculoskeletal:  Negative for back pain.   Neurological:  Negative for headaches.     Physical Exam     Initial Vitals [07/04/23 1154]   BP Pulse Resp Temp SpO2   (!) 169/79 87 (!) 22 98.3 °F (36.8 °C) (!) 94 %      MAP       --         Physical Exam    Nursing note and vitals reviewed.  Constitutional: He appears well-developed and well-nourished. No distress.   HENT:   Head: Normocephalic and atraumatic.   Eyes: Conjunctivae and EOM are normal. Pupils are equal, round, and  reactive to light.   Neck: Neck supple. No tracheal deviation present.   Normal range of motion.  Cardiovascular:  Normal rate and intact distal pulses.           Pulmonary/Chest: No respiratory distress.   Mild tachypnea   Abdominal: Abdomen is soft. There is no abdominal tenderness.   Musculoskeletal:         General: Edema (2+ pitting edema to bilateral lower extremities) present. No tenderness. Normal range of motion.      Cervical back: Normal range of motion and neck supple.     Neurological: He is alert and oriented to person, place, and time. He has normal strength. No cranial nerve deficit. GCS score is 15. GCS eye subscore is 4. GCS verbal subscore is 5. GCS motor subscore is 6.   Skin: Skin is warm and dry.       ED Course   Critical Care    Date/Time: 7/4/2023 1:17 PM  Performed by: Eliazar Robertson MD  Authorized by: Eliazar Robertson MD   Direct patient critical care time: 15 minutes  Additional history critical care time: 15 minutes  Ordering / reviewing critical care time: 15 minutes  Documentation critical care time: 15 minutes  Consulting other physicians critical care time: 15 minutes  Consult with family critical care time: 10 minutes  Total critical care time (exclusive of procedural time) : 85 minutes  Critical care time was exclusive of separately billable procedures and treating other patients.  Critical care was necessary to treat or prevent imminent or life-threatening deterioration of the following conditions: respiratory failure and metabolic crisis.  Critical care was time spent personally by me on the following activities: development of treatment plan with patient or surrogate, interpretation of cardiac output measurements, evaluation of patient's response to treatment, obtaining history from patient or surrogate, ordering and performing treatments and interventions, ordering and review of laboratory studies, ordering and review of radiographic studies, pulse oximetry,  re-evaluation of patient's condition and review of old charts.      Labs Reviewed   CBC W/ AUTO DIFFERENTIAL - Abnormal; Notable for the following components:       Result Value    RBC 4.41 (*)     Hemoglobin 13.4 (*)     Hematocrit 38.6 (*)     Immature Granulocytes 1.2 (*)     Immature Grans (Abs) 0.11 (*)     Lymph # 0.9 (*)     Mono # 1.4 (*)     Gran % 74.4 (*)     Lymph % 9.1 (*)     All other components within normal limits   COMPREHENSIVE METABOLIC PANEL - Abnormal; Notable for the following components:    Sodium 123 (*)     Potassium 5.2 (*)     Chloride 89 (*)     Glucose 142 (*)     Alkaline Phosphatase 160 (*)     All other components within normal limits   B-TYPE NATRIURETIC PEPTIDE - Abnormal; Notable for the following components:     (*)     All other components within normal limits   B-TYPE NATRIURETIC PEPTIDE - Abnormal; Notable for the following components:     (*)     All other components within normal limits   TROPONIN I   MAGNESIUM   BLUE-TOP TUBE   POCT INFLUENZA A/B MOLECULAR   SARS-COV-2 RDRP GENE     EKG Readings: (Independently Interpreted)   Initial Reading: No STEMI. Previous EKG: Compared with most recent EKG Previous EKG Date: 5/26/2023 (Minimal change). Rhythm: Normal Sinus Rhythm. Heart Rate: 83. Ectopy: No Ectopy. ST Segments: Normal ST Segments. Axis: Normal.   EKG independently interpreted by me pending Cardiology review         X-Rays:   Independently Interpreted Readings:   Other Readings:  Chest x-ray independently interpreted by me pending radiology review:  Concerning for pulmonary edema with pleural effusion, no gross infiltrate or pneumothorax  Imaging Results              X-Ray Chest AP Portable (Final result)  Result time 07/04/23 12:41:44      Final result by Michael Cortez MD (07/04/23 12:41:44)                   Impression:      Cardiomegaly with bilateral pleural effusions.      Electronically signed by: Michael Cortez  MD  Date:    07/04/2023  Time:    12:41               Narrative:    EXAMINATION:  XR CHEST AP PORTABLE    CLINICAL HISTORY:  Shortness of breath;    TECHNIQUE:  Single frontal view of the chest was performed.    COMPARISON:  05/25/2023    FINDINGS:  The lungs are clear with normal appearance of pulmonary vasculature. Bilateral pleural effusions.  No evident pneumothorax.    The cardiac silhouette is enlarged.  The hilar and mediastinal contours are unremarkable.    Bones are intact.                                      Medications   bumetanide injection 2 mg (has no administration in time range)   levalbuterol nebulizer solution 1.25 mg (has no administration in time range)   nitroGLYCERIN 2% TD oint ointment 1 inch (1 inch Topical (Top) Given 7/4/23 1241)     Medical Decision Making:   History:   Old Medical Records: I decided to obtain old medical records.  Old Records Summarized: records from previous admission(s).       <> Summary of Records: Reviewed recent discharge summary documenting baseline medications and past medical history  Initial Assessment:   83-year-old male presents emergency department complaining of progressive shortness of breath   Differential Diagnosis:   ACS, dissection, pneumonia, pneumothorax, CHF, COPD, anemia  Independently Interpreted Test(s):   I have ordered and independently interpreted X-rays - see prior notes.  I have ordered and independently interpreted EKG Reading(s) - see prior notes  Clinical Tests:   Lab Tests: Reviewed       <> Summary of Lab: CBC without leukocytosis, normal H&H; CMP concerning for hyponatremia, normal renal and liver function tests; troponin within normal limits; COVID and influenza negative  ED Management:  Patient given some nitro paste and subsequently some Bumex and a breathing treatment.  He is feeling little bit better.  I am concerned his hyponatremia and hyperkalemia.  Discussed with [facility] medicine who will see and admit the patient for  further evaluation and management.  Patient and family are comfortable with admission at this time.  Other:   I have discussed this case with another health care provider.       <> Summary of the Discussion: Discussed with [facility] internal medicine who will see and admit the patient for further evaluation and management.                         Clinical Impression:   Final diagnoses:  [R06.02] Shortness of breath  [I50.9] Acute on chronic congestive heart failure, unspecified heart failure type (Primary)  [E87.1] Hyponatremia        ED Disposition Condition    Observation                 Eliazar Robertson MD  07/04/23 6030

## 2023-07-05 PROBLEM — I50.33 ACUTE ON CHRONIC DIASTOLIC HEART FAILURE: Status: ACTIVE | Noted: 2023-01-01

## 2023-07-05 PROBLEM — E87.1 HYPONATREMIA: Status: ACTIVE | Noted: 2023-01-01

## 2023-07-05 PROBLEM — R13.13 PHARYNGEAL DYSPHAGIA: Status: RESOLVED | Noted: 2023-01-01 | Resolved: 2023-01-01

## 2023-07-05 PROBLEM — Z51.5 COMFORT MEASURES ONLY STATUS: Status: ACTIVE | Noted: 2023-01-01

## 2023-07-05 PROBLEM — E87.5 HYPERKALEMIA: Status: ACTIVE | Noted: 2023-01-01

## 2023-07-05 PROBLEM — E87.1 HYPONATREMIA: Status: RESOLVED | Noted: 2023-01-01 | Resolved: 2023-01-01

## 2023-07-05 PROBLEM — R06.02 SHORTNESS OF BREATH: Status: RESOLVED | Noted: 2022-02-09 | Resolved: 2023-01-01

## 2023-07-05 PROBLEM — I50.9 ACUTE ON CHRONIC CONGESTIVE HEART FAILURE: Status: RESOLVED | Noted: 2023-01-01 | Resolved: 2023-01-01

## 2023-07-05 NOTE — PROGRESS NOTES
[facility] Hospital Medicine Progress Note    Primary Team: [facility] Hospitalist Team A  Attending Physician: Anastasia García MD  Intern: Rajni    Subjective/Interval History      The pt's SOB improved with diuretics, and lost around 5 Ibs since admission. His abdomen is still distended, but not complaining of any abdominal pain, chest pain     Objective     Physical Examination:  Temp:  [96.2 °F (35.7 °C)-98.3 °F (36.8 °C)] 96.4 °F (35.8 °C)  Pulse:  [70-92] 71  Resp:  [18-24] 18  SpO2:  [94 %-98 %] 96 %  BP: (129-169)/(62-91) 156/74  General: sitting comfortably in bed  Head: normocephalic, atraumatic  Neck: No thyromegaly or masses   Cardiac: RRR, an ejection systolic murmur was appreciated on the Rt 2nd intercostal space radiating to the neck was appreciated, no extra heart sounds  Chest: no respiratory distress, no wheezing, symmetric chest rise, no crackles, decreased breath sounds to bilateral lung bases  GI: distended, non tender, normoactive bowel sounds, a paraumbilical hernial was appreciated, but no organomegaly appreciated, no shifting dullness was appreciated  Extremities: BLE pitting edema till the bilateral knees, stasis dermatitis, cool to touch BLE, no clubbing, or cyanosis  Lymph nodes: no palpable cervical lymph nodes  Skin: dry, warm, intact. No bruising or rashes.  Neuro: Alert and oriented, moving all extremities, sensation equal and symmetric     Intake/Output:    Intake/Output Summary (Last 24 hours) at 7/5/2023 0654  Last data filed at 7/5/2023 0646  Gross per 24 hour   Intake --   Output 3100 ml   Net -3100 ml     Net IO Since Admission: -3,100 mL [07/05/23 0654]    Laboratory:  Recent Labs   Lab 07/04/23  1222 07/04/23  1542 07/04/23  1701 07/05/23  0312   WBC 9.48  --   --  8.21   HGB 13.4*  --   --  13.1*     --   --  429   MCV 88  --   --  89   *  --   --  128*   K 5.2*  --   --  3.8   CL 89*  --   --  86*   CO2 23  --   --  31*   BUN 11  --   --  11   CREATININE 0.7  --   --   0.7   *  --   --  114*   CALCIUM 8.9  --   --  9.3   PROT 6.8  --   --  7.0   ALBUMIN 3.5  --   --  3.4*   PHOS  --  3.8  --   --    MG 1.8  --   --   --    AST 27  --   --  23   ALT 23  --   --  21   ALKPHOS 160*  --   --  151*   TROPONINI 0.012  --  <0.006  --    *  110*  --   --   --        All laboratory data reviewed      Radiology: reviewed   Imaging Results              X-Ray Chest AP Portable (Final result)  Result time 07/04/23 12:41:44      Final result by Michael Cortez MD (07/04/23 12:41:44)                   Impression:      Cardiomegaly with bilateral pleural effusions.      Electronically signed by: Michael Cortez MD  Date:    07/04/2023  Time:    12:41               Narrative:    EXAMINATION:  XR CHEST AP PORTABLE    CLINICAL HISTORY:  Shortness of breath;    TECHNIQUE:  Single frontal view of the chest was performed.    COMPARISON:  05/25/2023    FINDINGS:  The lungs are clear with normal appearance of pulmonary vasculature. Bilateral pleural effusions.  No evident pneumothorax.    The cardiac silhouette is enlarged.  The hilar and mediastinal contours are unremarkable.    Bones are intact.                                        Current Medications:     Infusions:       Scheduled:   bumetanide  1 mg Oral BID loop    cetirizine  10 mg Oral Daily    enoxparin  40 mg Subcutaneous Daily    fluticasone furoate-vilanteroL  1 puff Inhalation Daily    valsartan  320 mg Oral Daily    vitamin D  1,000 Units Oral Daily        PRN:  diphenhydrAMINE, sodium chloride 0.9%    Assessment/Plan:     Alexander Kelly is a 83 y.o. male with past medical history of HEpEF, COPD (not on O2 therapy, ex smoker), Pulmonary hypertension, chronic alcoholism, chronic hyponatremia and Hypertension, who presented to [facility] on 7/4/2023 with a primary   complaint of Shortness of Breath and gained 5-7 lbs in the last week. Patient is admitted to [facility] for Heart failure  exacerbation.    Acute on chronic diastolic heart failure exacerbation, improving  - , volume overloaded on exam and CXR  - ECHO done on 5/26/2023: EF 65%, left ventricle is normal in size, Moderate left/right atrial enlargement, mild aortic valve stenosis, estimated PA systolic pressure is 51 mmHg, there is pulmonary hypertension, small to moderate posterior pericardial effusion.  - given 2 mg Bumex IV in the ED, with positive urine output  - continue shift IV to PO Bid diuresis  - monitoring of I /O  - initial troponin 0.006, will follow 2nd troponin  - EKG: NSR  - daily weight measurements when possible  - cardiac diet with fluid restriction      Bilateral pleural effusions L>R  - likely 2/2 volume overload (more likely transudative effusion, secondary to chronic heart failure) but if not improving, will work up further  - SpO2 95% on 1 L  - continue diuresis as above  - compared to the previous CXR, stable course     Pericardial effusion  - mild to moderate effusion noted on bedside and prior TTE, likely 2/2 volume overload  - continue dieresis as above  - no hemodynamic compromise     Acute on chronic hyponatremia, improving  Hypervolemic hyponatremia  - baseline Na likely ~132  - Na 123 on admit, today's Na128  - per chart review, no work up of chronic hyponatremia   - TSH wnl on the previous admission  - acute component likely due to volume overload in the setting of acute on chronic HFpE  - long history of alcohol use      Hyperkalemia, improving  - K on admission 5.2, latest K  3.8  - restart K supplementations, because of increasing diuretics  - On ARB (Valsartan  325 daily), for HTN     Chronic COPD, stable  - pulm edema wheezing vs COPD exacerbation, will treat for COPD   - Continue Symbicort inhaler  - keep SpO2 between 88-94%,     Pulmonary HTN  - will refer to pulmonary HTN clinic upon discharge  - Sleep study upon discharge, to check for secondary causes such as ALONDRA     HTN  -  continue home  valsartan 325 daily     PAD with chronic lymphedema  Venous stasis dermatitis  - patient and family feel the size of his lower extremities is at baseline   - resume lymphedema pumps at home      Elevated bilirubin, chronic  - T bili 0.9 on admit, elevated on previous admissions; INR 1.1, albumin 3.5  - long history of alcohol use   - Abd US with normal liver, no ascites on 5/25/23     Mild normocytic anemia   - Hb on admission 13.4, MCV 89, current Hb 13.1  - possibly dilutional in the setting of volume overload  - follow outpt    #Healthcare maintenance   -primary care provider is Ata Vines MD      Diet: Cardiac diet  VTE PPx: Enoxaparin  Code Status: Full  Dispo: Today    Esperanza Urban MD  [facility] Intern  [facility] Hospitalist Medicine Team A      [facility] Hospitalist Pager numbers:   [facility] Hospitalist Medicine Team A (Hakan/Raquel): [phone]  [facility] Hospitalist Medicine Team B (Franklyn/Rhett):  [phone]

## 2023-07-05 NOTE — PLAN OF CARE
Introduced as VN and will be reviewing discharge instructions.  Educated patient on reason for admission, home medication list, and discharge instructions including when to return to ED and the following doctor appointments.  Education per flowsheet.  Opportunity given for questions and questions answered.  Nurse  notified of   completion of discharge education. Patient has nichols to be d/c before discharge

## 2023-07-05 NOTE — DISCHARGE SUMMARY
[facility] Medicine Discharge Summary    Primary Team: [facility] Hospitalist Team a  Attending Physician: Anastasia García MD  Intern: Dr. Urban    Date of Admit: 7/4/2023  Date of Discharge: 7/5/2023    Discharge to: Home/Self-Care  Condition: Stable    Discharge Diagnoses     Patient Active Problem List   Diagnosis    Venous stasis dermatitis of both lower extremities    Lymphedema of both lower extremities    Obesity (BMI 30-39.9)    Acute on chronic diastolic congestive heart failure    Pulmonary hypertension    Pleural effusion    COPD (chronic obstructive pulmonary disease)    Weakness    HTN (hypertension)    Venous insufficiency of both lower extremities    PAD (peripheral artery disease)    Volume overload    Pulmonary edema    Normocytic anemia    Hyperbilirubinemia    COPD exacerbation    Pericardial effusion    Acute on chronic diastolic heart failure    Hyponatremia    Hyperkalemia       Consultants and Procedures     Consultants:  None    Procedures:   None    Imaging:  Imaging Results              X-Ray Chest AP Portable (Final result)  Result time 07/04/23 12:41:44      Final result by Michael Cortez MD (07/04/23 12:41:44)                   Impression:      Cardiomegaly with bilateral pleural effusions.      Electronically signed by: Michael Cortez MD  Date:    07/04/2023  Time:    12:41               Narrative:    EXAMINATION:  XR CHEST AP PORTABLE    CLINICAL HISTORY:  Shortness of breath;    TECHNIQUE:  Single frontal view of the chest was performed.    COMPARISON:  05/25/2023    FINDINGS:  The lungs are clear with normal appearance of pulmonary vasculature. Bilateral pleural effusions.  No evident pneumothorax.    The cardiac silhouette is enlarged.  The hilar and mediastinal contours are unremarkable.    Bones are intact.                                       Brief History of Present Illness      Alexander Kelly is a 83 y.o. male with past medical history of HEpEF, COPD (not on O2 therapy, ex  smoker), Pulmonary hypertension, chronic alcoholism, chronic hyponatremia and Hypertension, who presented to [facility] on 7/4/2023 with a primary   complaint of Shortness of Breath and gained 5-7 lbs in the last week. Patient is admitted to [facility] for Heart failure exacerbation.    For the full HPI please refer to the History & Physical from this admission.    Hospital Course By Problem with Pertinent Findings   Acute on chronic diastolic heart failure exacerbation, improved  - , volume overloaded on exam and CXR  - ECHO done on 5/26/2023: EF 65%, left ventricle is normal in size, Moderate left/right atrial enlargement, mild aortic valve stenosis, estimated PA systolic pressure is 51 mmHg, there is pulmonary hypertension, small to moderate posterior pericardial effusion.  - given 2 mg Bumex IV in the ED, with positive urine output  - increased Bumex frequency PO to Bid on discharge  - EKG: NSR  - cardiac diet with fluid restriction      Bilateral pleural effusions L>R, stable  - likely 2/2 volume overload (more likely transudative effusion, secondary to chronic heart failure)  - compared to the previous CXR, stable course     Pericardial effusion  - mild to moderate effusion noted on bedside and prior TTE, likely 2/2 volume overload  - no hemodynamic compromise     Acute on chronic hyponatremia, improved  Hypervolemic hyponatremia  - baseline Na likely ~132  - Na 123 on admit, on discharge Na 128  - per chart review, no work up of chronic hyponatremia   - TSH wnl on the previous admission  - acute component likely due to volume overload in the setting of acute on chronic HFpE  - long history of alcohol use      Hyperkalemia, improved  - K on admission 5.2, latest on discharge K  3.8  - restart K supplementations, because of increased diuretic's frequency on discharge  - On ARB (Valsartan  325 daily), for HTN     Chronic COPD, stable  - Continue Symbicort inhaler     Pulmonary HTN  -  will refer to pulmonary HTN clinic upon discharge  - Sleep study upon discharge, check for secondary causes such as ALONDRA     HTN  -  continue home valsartan 325 daily     PAD with chronic lymphedema  Chronic Venous stasis dermatitis  - patient and family feel the size of his lower extremities is at baseline   - resume lymphedema pumps at home      Elevated bilirubin, chronic  - T bili 0.9 on admit, elevated on previous admissions; INR 1.1, albumin 3.5  - long history of alcohol use   - Abd US with normal liver, no ascites on 5/25/23     Mild normocytic anemia   - Hb on admission 13.4, MCV 89, current Hb 13.1  - possibly dilutional in the setting of volume overload  - follow outpt    Discharge Medications        Medication List        CHANGE how you take these medications      bumetanide 1 MG tablet  Commonly known as: BUMEX  Take 1 tablet (1 mg total) by mouth 2 (two) times a day.  What changed: when to take this     valsartan 320 MG tablet  Commonly known as: DIOVAN  TAKE ONE TABLET BY MOUTH ONCE DAILY.  What changed: when to take this            CONTINUE taking these medications      APPLE CIDER VINEGAR ORAL     budesonide-formoterol 160-4.5 mcg 160-4.5 mcg/actuation Hfaa  Commonly known as: SYMBICORT  Inhale 2 puffs into the lungs every 12 (twelve) hours.     cetirizine 10 MG tablet  Commonly known as: ZYRTEC     LUBRIDERM ADVANCED THERAPY TOP     ONE-A-DAY MEN'S 50 PLUS(VIT K) 400- mcg Tab  Generic drug: multivit-min-folic-vit K-lycop     potassium chloride SA 20 MEQ tablet  Commonly known as: K-DUR,KLOR-CON  Take 1 tablet (20 mEq total) by mouth once daily.     saw palmetto 500 MG capsule     serine (bulk) 100 % Aline     vitamin D 1000 units Tab  Commonly known as: VITAMIN D3            STOP taking these medications      diphenhydrAMINE 25 mg capsule  Commonly known as: BENADRYL               Where to Get Your Medications        These medications were sent to [id number] - [place] - [address]  [facility]  [address] [place] [address]      Phone: [phone]   bumetanide 1 MG tablet          Discharge Information:   Diet:  Low salt, cardiac diet             Instructions:  1. Take all medications as prescribed  2. Keep all follow-up appointments  3. Return to the hospital or call your primary care physicians if any worsening symptoms such as fever, chest pain, shortness of breath, return of symptoms, or any other concerns.    Follow-Up Appointments:  Pulmonary hypertension clinic  Sleep study    Esperanza Urban MD  \[facility]\"" Intern  07/05/2023 11:43 AM

## 2023-07-05 NOTE — PLAN OF CARE
Alexander Kelly MRN:[id number] ([facility]#[id number]) (83 y.o. M) (Adm: 23)  [facility] [id number] A  Patient Demographics    Patient Name   Alexander Kelly Legal Sex   Male          Age   1940 (83 y.o.) N    Address   [address]   [place] [address] Phone   [phone] (Home)   [phone] (Work)   [phone] (Mobile)     Patient Demographics    [address]   [address] [place] [address] Phone   [phone] (Home)   [phone] (Work)   [phone] (Mobile)     PCP and Center    Primary Care Provider   Niles Martin MD Phone   [phone] Center   None     Patient Contacts    Name Relation Home Work Mobile   Chelsea Kelly Spouse [phone]   alysia Jimenez Daughter   [phone]     Documents on File     Status Date Received Description   Documents for the Patient   Patient ID Received 19 LADL   Insurance Documents Received 19 Insurance Documents   Notice of Privacy Pract Ackn Signed 19 HIPAA   Clinic Authorization Signed () 19 CONSENT   Consents      Clinic Authorization Unable to Obtain () 22    Clinic Authorization Unable to Obtain () 21    [facility] Contracted Facility Disclosure Unable to Obtain 22    Plain Language Summary English Acknowledged () 22    Provider Based Acknowledgement      Authorization or Referral Received () 22 GENERAL   Outside Progress or Provider Notes Received 22    Advance Directive Acknowledgement      Clinic Authorization Unable to Obtain () 22    Plain Language Summary English Acknowledged () 22    Plain Language Summary English Acknowledged () 02/10/22    Clinic Authorization Unable to Obtain () 02/10/22    Clinic Authorization      Plain Language Summary English Acknowledged () 22    Outside Lab Received 22    Plain Language Summary English Acknowledged () 03/10/22    Clinic  Authorization Signed () 03/10/22    Miscellaneous Documents clinic Received 03/10/22 Wound Care & Hyperbarics Referral Sheet   Plain Language Summary English      Plain Language Summary English Acknowledged () 22    Plain Language Summary English Acknowledged () 22    OHS Not Contracted Facility Disclosure      Plain Language Summary English Acknowledged () 22    Plain Language Summary English Acknowledged () 23    Plain Language Summary English Acknowledged () 02/15/23    Plain Language Summary English Acknowledged () 23    Plain Language Summary English Acknowledged () 23    Patient Photo   Photo of Patient   Documents for the Encounter   Medicare Lifetime Reserve Form      Important Medicare Message Printed at Discharge      Advance Beneficiary Notice      Hospital Authorization Signed 23    Important Medicare Message [facility] Signed 23      Admission Information    Current Information    Attending Provider Admitting Provider Admission Type Admission Status   MD Anastasia Pulido MD Emergency Confirmed Admission          Admission Date/Time Discharge Date Hospital Service Auth/Cert Status   23  11:57 AM  Internal Medicine Incomplete          Hospital Area Unit Room/Bed    [facility] TELEMETRY UNIT K402/K402 A            Discharge Disposition Discharge Destination   Home or Self Care      Admission    Complaint        Hospital Account    Name Acct ID Class Status Primary Coverage   Alexander Kelly [id number] OP- Observation Open [employer] - [employer] 65            Guarantor Account (for Hospital Account #[id number])    Name Relation to Pt Service Area Active? Acct Type   Alexander Kelly Self OHSSA Yes Personal/Family   Address Phone     [address] [place] [address] [phone](H)              Coverage Information (for Hospital Account  #[id number])    F/O Payor/Plan Precert #   [employer] 65 PENDING   Subscriber Subscriber #   Alexander Kelly [id number]   Address Phone   [address]   [place] [address] [phone]      Inpatient consult to Social Work [[id number]] (Order [id number])  Consult  Date and Time: 2023 11:56 AM Department: [facility] Telemetry Unit Rel By/Authorizing: Anastasia García MD     Order Information    Order Date/Time Release Date/Time Start Date/Time End Date/Time   23 11:56 AM None 23 11:57 AM 23 11:57 AM     Order Details    Frequency Duration Priority Order Class   Once 1  occurrence Routine Hospital Performed     Standing Order Information    Remaining Occurrences Interval       Once               Comments    SN              Inpatient consult to Social Work: Patient Communication     Not Released  Not seen     Order Questions    Question Answer   Reason for Consult HOME HEALTH                      Collection Information          Consult Order Info    ID Description Priority Start Date Start Time   [id number] Inpatient consult to Social Work Routine 2023 11:57 AM   Provider Specialty Referred to   ______________________________________ _____________________________________                       Verbal Order Info    Action Created on Order Mode Entered by Responsible Provider Signed by Signed on   Ordering 23 1156 Verbal with readback NELLIE Viveros MD             Patient Information    Patient Name   Alexander Kelly Legal Sex   Male    1940 [facility]          Additional Information    Associated Reports   Priority and Order Details         Order Provider Info        Office phone Pager E-mail   Ordering User Asia Marino RN -- -- [email or web address]   Authorizing Provider Anastasia García [phone] -- --   Attending Provider Anastasia García -[phone] -- --   Entered By Asia Marino RN -- -- --    Ordering Provider Anastasia García [phone] -- --       Inpatient consult to Social Work [[id number]]    Awaiting signature from: Anastasia García MD Status: Active   Mode: Ordering in Verbal with readback mode Communicated by: Asia Marino RN   Ordering user: Asia Marino RN 07/05/23 1156 Ordering provider: Anastasia García MD   Authorized by: Anastasia García MD Ordering mode: Verbal with readback   Frequency: Once 07/05/23 1157 - 1  occurrence     Questionnaire    Question Answer   Reason for Consult HOME HEALTH   Order comments: SN

## 2023-07-05 NOTE — PLAN OF CARE
PCP follow-up scheduled.  sent In-Basket message to Cardiology office to schedule. MD team to write Home Health orders for patient.  will send to [facility] to assign. Family will transport home at discharge.     1158--I verified with Dr. Rodriguez, only SN needed no PT/OT for Home Health. Home Health orders routed via [employer] to [facility] to assign.    1244-- verified with Dr. Rodriguez no oxygen needed at discharge. MD team checked his oxygen.    1447-- contacted [facility] Phone#[phone]. They stated they did receive the Home Health orders and are working on it.     Patient Contacts    Name Relation Home Work Mobile   Chelsea Kelly Spouse [phone]   alysia Jimenez Daughter   [phone]                 Name Relationship Specialty Phone Fax Address Order                MD Niles Richmond MD PCP - General Internal Medicine [phone] [address] [place] [address] [facility] [address]         Next Steps: Follow up on 7/11/2023  Appointment:   Instructions: at 3:30 pm, Primary Care Physician Fax#[phone]        [facility] [phone] [address] [place] [address]         Next Steps: Follow up  Appointment:   Instructions: [employer] [facility] to assign Home Health Company. Home Health Company        Artur Healy MD PhD Artur Healy MD PhD  Interventional Cardiology Cardiology Vascular Medicine [phone] [address] [facility] [address] [place] [address]         Next Steps: Follow up  Appointment:   Instructions: Cardiology Follow-Up Requested.               07/05/23 1146   Final Note   Assessment Type Final Discharge Note   Anticipated Discharge Disposition Home-Health   Hospital Resources/Appts/Education Provided Appointments scheduled by Navigator/Coordinator   Post-Acute Status   Post-Acute Authorization Home Health   Home  Health Status Referrals Sent   Discharge Delays None known at this time     Asia Marino RN    [phone]

## 2023-07-05 NOTE — PLAN OF CARE
[facility] [facility] - Telemetry      HOME HEALTH ORDERS  FACE TO FACE ENCOUNTER    Patient Name: Alexander Kelly  YOB: 1940    PCP: Niles Martin MD   PCP Address: [address] [place] [address] [facility]  PCP Phone Number: [phone]  PCP Fax: [phone]    Encounter Date: 7/4/23    Admit to Home Health    Diagnoses:  Active Hospital Problems    Diagnosis  POA    HTN (hypertension) [I10]  Yes    COPD (chronic obstructive pulmonary disease) [J44.9]  Yes    Pleural effusion [J90]  Yes      Resolved Hospital Problems    Diagnosis Date Resolved POA    *Acute on chronic congestive heart failure [I50.9] 07/05/2023 Unknown    Pharyngeal dysphagia [R13.13] 07/05/2023 Yes    Hyponatremia [E87.1] 07/05/2023 Yes    Shortness of breath [R06.02] 07/05/2023 Yes       Follow Up Appointments:  No future appointments.    Allergies:Review of patient's allergies indicates:  No Known Allergies    Medications: Review discharge medications with patient and family and provide education.    Current Facility-Administered Medications   Medication Dose Route Frequency Provider Last Rate Last Admin    bumetanide tablet 1 mg  1 mg Oral BID loop Christian Rodriguez MD   1 mg at 07/05/23 0827    cetirizine tablet 10 mg  10 mg Oral Daily Christian Rodriguez MD   10 mg at 07/05/23 0827    diphenhydrAMINE capsule 25 mg  25 mg Oral Nightly PRN Christian Rodriguez MD        enoxaparin injection 40 mg  40 mg Subcutaneous Daily Christian Rodriguez MD   40 mg at 07/04/23 1811    fluticasone furoate-vilanteroL 100-25 mcg/dose diskus inhaler 1 puff  1 puff Inhalation Daily Christian Rodriguez MD   1 puff at 07/05/23 0731    sodium chloride 0.9% flush 10 mL  10 mL Intravenous PRN Christian Rodriguez MD        valsartan tablet 320 mg  320 mg Oral Daily Christian Rodriguez MD   320 mg at 07/05/23 0827    vitamin D 1000 units tablet 1,000 Units  1,000 Units Oral Daily Christian Rodriguez MD   1,000 Units at 07/05/23 0827     Current Discharge Medication List         CONTINUE these medications which have CHANGED    Details   bumetanide (BUMEX) 1 MG tablet Take 1 tablet (1 mg total) by mouth 2 (two) times a day.  Qty: 90 tablet, Refills: 3           CONTINUE these medications which have NOT CHANGED    Details   APPLE CIDER VINEGAR ORAL Take by mouth.      budesonide-formoterol 160-4.5 mcg (SYMBICORT) 160-4.5 mcg/actuation HFAA Inhale 2 puffs into the lungs every 12 (twelve) hours.  Qty: 1 g, Refills: 3      cetirizine (ZYRTEC) 10 MG tablet Take 10 mg by mouth once daily.      emollient combination no.71 (LUBRIDERM ADVANCED THERAPY TOP) Apply topically 2 (two) times a day.      multivit-min-folic-vit K-lycop (ONE-A-DAY MEN'S 50 PLUS,VIT K,) 400- mcg Tab Take 1 tablet by mouth once daily.      potassium chloride SA (K-DUR,KLOR-CON) 20 MEQ tablet Take 1 tablet (20 mEq total) by mouth once daily.  Qty: 90 tablet, Refills: 3      saw palmetto 500 MG capsule Take 500 mg by mouth once daily.      serine, bulk, 100 % Aline Take 1 capsule by mouth once daily.      valsartan (DIOVAN) 320 MG tablet TAKE ONE TABLET BY MOUTH ONCE DAILY.  Qty: 90 tablet, Refills: 3      vitamin D (VITAMIN D3) 1000 units Tab Take 1,000 Units by mouth once daily.           STOP taking these medications       diphenhydrAMINE (BENADRYL) 25 mg capsule Comments:   Reason for Stopping:                 I have seen and examined this patient within the last 30 days. My clinical findings that support the need for the home health skilled services and home bound status are the following:no   Patient with medication mismanagement issues requiring home bound status as evidenced by  Poor adherence to medication regimen/dosage.     Diet:   cardiac diet    Labs: None      Referrals/ Consults  none    Activities:   activity as tolerated      SN to complete comprehensive assessment including routine vital signs. Instruct on disease process and s/s of complications to report to MD. Review/verify medication list sent home  with the patient at time of discharge  and instruct patient/caregiver as needed. Frequency may be adjusted depending on start of care date.     Skilled nurse to perform up to 3 visits PRN for symptoms related to diagnosis    Notify MD if SBP > 160 or < 90; DBP > 90 or < 50; HR > 120 or < 50; Temp > 101; O2 < 88%; Other:     Ok to schedule additional visits based on staff availability and patient request on consecutive days within the home health episode.    When multiple disciplines ordered:    Start of Care occurs on Sunday - Wednesday schedule remaining discipline evaluations as ordered on separate consecutive days following the start of care.    Thursday SOC -schedule subsequent evaluations Friday and Monday the following week.     Friday - Saturday SOC - schedule subsequent discipline evaluations on consecutive days starting Monday of the following week.    For all post-discharge communication and subsequent orders please contact patient's primary care physician. If unable to reach primary care physician or do not receive response within 30 minutes, please contact Dr. Roney Rodriguez for clinical staff order clarification    I certify that this patient is confined to his home and needs help with medication assistance.

## 2023-07-05 NOTE — PLAN OF CARE
went to meet with patient. Patient reports he is independent and lives at home with his spouse. He has a rolling walker and wheelchair he uses at times. Patient does not have Home Health. He still drives, but his spouse can transport home at discharge. Patient refuses PCC follow-up. He prefers to see his PCP Dr. Martin at . He does not wear oxygen at home.  contacted PCP office to schedule follow-up. I also sent In-Basket message to patient's Cardiology office for follow-up (Dr. Healy). Patient encouraged to call with any questions or concerns.  will continue to follow patient through transitions of care and assist with any discharge needs.     Patient Contacts    Name Relation Home Work Mobile   Chelsea Kelly Spouse [phone]   alysia Jimenez Daughter   [phone]     MD Niles Richmond MD PCP - General Internal Medicine [phone] [address] [facility] [place] [id number]      Next Steps: Follow up on 7/11/2023  Appointment:   Instructions: at 3:30 pm, Primary Care Physician Fax#[phone]      07/05/23 1135   Discharge Assessment   Assessment Type Discharge Planning Assessment   Confirmed/corrected address, phone number and insurance Yes   Confirmed Demographics Correct on Facesheet   Source of Information patient   People in Home significant other;spouse   Facility Arrived From: Home   Do you expect to return to your current living situation? Yes   Do you have help at home or someone to help you manage your care at home? Yes   Who are your caregiver(s) and their phone number(s)? Chelsea (Spouse) Phone#[phone]   Prior to hospitilization cognitive status: Alert/Oriented   Current cognitive status: Alert/Oriented   Walking or Climbing Stairs ambulation difficulty, requires equipment   Equipment Currently Used at Home walker, rolling;wheelchair   Do you take prescription medications? Yes   Do you have  prescription coverage? Yes   Do you have any problems affording any of your prescribed medications? No   Is the patient taking medications as prescribed? yes   Who is going to help you get home at discharge? Chelsea (Spouse) Phone#[phone]   How do you get to doctors appointments? car, drives self;family or friend will provide   Are you on dialysis? No   Do you take coumadin? No   Discharge Plan A Home   Discharge Plan B Home with family   DME Needed Upon Discharge  none   Discharge Plan discussed with: Patient   Transition of Care Barriers None   Physical Activity   On average, how many days per week do you engage in moderate to strenuous exercise (like a brisk walk)? Patient refu   On average, how many minutes do you engage in exercise at this level? Patient refu   Financial Resource Strain   How hard is it for you to pay for the very basics like food, housing, medical care, and heating? Not hard   Housing Stability   In the last 12 months, was there a time when you were not able to pay the mortgage or rent on time? N   In the last 12 months, was there a time when you did not have a steady place to sleep or slept in a shelter (including now)? N   Transportation Needs   In the past 12 months, has lack of transportation kept you from medical appointments or from getting medications? no   In the past 12 months, has lack of transportation kept you from meetings, work, or from getting things needed for daily living? No   Food Insecurity   Within the past 12 months, you worried that your food would run out before you got the money to buy more. Never true   Within the past 12 months, the food you bought just didn't last and you didn't have money to get more. Never true   Stress   Do you feel stress - tense, restless, nervous, or anxious, or unable to sleep at night because your mind is troubled all the time - these days? Patient refu   Social Connections   In a typical week, how many times do you talk on the phone with  family, friends, or neighbors? More than 3   How often do you get together with friends or relatives? More than 3   How often do you attend Anabaptist or Adventism services? Patient refu   Do you belong to any clubs or organizations such as Anabaptist groups, unions, fraternal or athletic groups, or school groups? Patient refu   How often do you attend meetings of the clubs or organizations you belong to? Patient refu   Are you , , , , never , or living with a partner?    Alcohol Use   Q1: How often do you have a drink containing alcohol? Patient refu   Q2: How many drinks containing alcohol do you have on a typical day when you are drinking? Patient refu   Q3: How often do you have six or more drinks on one occasion? Patient refu     Asia Marino RN    [phone]

## 2023-07-07 NOTE — TELEPHONE ENCOUNTER
"Patient's spouse is on line, asking about hot to give Albuterol. I do not show it on patient's profile. States she is using her RX as it helps with his breathing. Asked if he was taking his RX for Symbicort, he is not. Advised to take medication as noted on his discharge summary. States he was was given Albuterol in hospital. Also advised to reach out to [facility] for HH set up.  Pulse ox SpO2 94% with pulse of 85, just finished breathing treatment. Triage done for breathing - call back from provider in one hour. Verb understanding. Reason for Disposition   Oxygen level (e.g., pulse oximetry) 91 to 94 percent    Additional Information   Negative: SEVERE difficulty breathing (e.g., struggling for each breath, speaks in single words, pulse > 120)   Negative: Breathing stopped and hasn't returned   Negative: Choking on something   Negative: Bluish (or gray) lips or face   Negative: Difficult to awaken or acting confused (e.g., disoriented, slurred speech)   Negative: Passed out (i.e., fainted, collapsed and was not responding)   Negative: Wheezing started suddenly after medicine, an allergic food, or bee sting   Negative: Stridor   Negative: Slow, shallow and weak breathing   Negative: Sounds like a life-threatening emergency to the triager   Negative: MODERATE difficulty breathing (e.g., speaks in phrases, SOB even at rest, pulse 100-120) of new-onset or worse than normal   Negative: Oxygen level (e.g., pulse oximetry) 90 percent or lower   Negative: Wheezing can be heard across the room   Negative: Drooling or spitting out saliva (because can't swallow)   Negative: Any history of prior "blood clot" in leg or lungs   Negative: Illness requiring prolonged bedrest in past month (e.g., immobilization, long hospital stay)   Negative: Hip or leg fracture (broken bone) in past month (or had cast on leg or ankle in past month)   Negative: Major surgery in the past month   Negative: Long-distance travel in past month " "(e.g., car, bus, train, plane; with trip lasting 6 or more hours)   Negative: Cancer treatment in past six months (or has cancer now)   Negative: Extra heart beats OR irregular heart beating (i.e., "palpitations")   Negative: Fever > 103 F (39.4 C)   Negative: Fever > 101 F (38.3 C) and over 60 years of age   Negative: Fever > 100.0 F (37.8 C) and bedridden (e.g., nursing home patient, stroke, chronic illness, recovering from surgery)   Negative: Fever > 100.0 F (37.8 C) and diabetes mellitus or weak immune system (e.g., HIV positive, cancer chemo, splenectomy, organ transplant, chronic steroids)   Negative: Periods where breathing stops and then resumes normally and bedridden (e.g., nursing home patient, CVA)   Negative: Pregnant or postpartum (from 0 to 6 weeks after delivery)   Negative: Patient sounds very sick or weak to the triager   Negative: MILD difficulty breathing (e.g., minimal/no SOB at rest, SOB with walking, pulse < 100) of new-onset or worse than normal   Negative: Longstanding difficulty breathing (e.g., CHF, COPD, emphysema) and worse than normal   Negative: Longstanding difficulty breathing and not responding to usual therapy   Negative: Continuous (nonstop) coughing   Negative: Patient wants to be seen    Protocols used: Breathing Difficulty-A-OH    "

## 2023-07-10 PROBLEM — R06.02 SOB (SHORTNESS OF BREATH): Status: ACTIVE | Noted: 2023-01-01

## 2023-07-10 NOTE — PROGRESS NOTES
[facility]      Chief Complaint   Patient presents with    Hospital Follow Up    Shortness of Breath       Patient ID: Alexander Kelly is a 83 y.o. male with HFpEF, HTN, COPD, bilateral hearing loss, alcoholism, obesity, who presents for a follow up appointment.  Pertinent history/events are as follows:     -Pt kindly referred by Dr. Martin for evaluation of lymphedema.    -At our initial clinic visit on 2/9/2022, Mr. Kelly states he was diagnosed with BLE lymphedema 4 years ago.  He uses a lymphedema pump daily.  Reports gaining 30 pounds in 3 weeks in 12/2021.  States he lost the weight after being started on bumex and metolazone.  Reports SOB starting 1 month ago, which is new for him.  He currently takes bumex 1 mg daily, and metolazone 2.5 mg daily at needed for weight gain.  Formerly smoked 1 pack a day for 16 years.  Quit at age 32.  Plan:   BLE Lymphedema with Venous Stasis Wounds- Check BLE venous reflux study and TUNDE study.  Refer to lymphedema clinic.  Continue bumex 1 mg daily.  Continue metolazone 2.5 mg daily at needed for weight gain of at least 2 pounds in a day or 5 pounds in a week.  Refer to wound care.  Given rash, refer to Dermatology for consideration for biopsy.   Pt to limit sodium intake to 2,000 mg daily.  Limit volume intake to 1.5 liters daily.    SOB- Check echo, CXR, and BNP.    HTN- Continue current medications.  Pt to keep log of blood pressure/heart rate and bring in next visit for review.   Obesity- Encourage diet, exercise and weight loss.    -2/14/2022: I talked with Mr. Kelly in detail.  He has severe SOB.  Echo done today shows:   Echo 2/14/2022:  The left ventricle is normal in size with normal systolic function.  The estimated ejection fraction is 65%.  Grade II left ventricular diastolic dysfunction.  The ascending aorta is mildly dilated.  Mild right ventricular enlargement with normal right ventricular systolic function.  Mild tricuspid regurgitation.  The  estimated PA systolic pressure is 66 mmHg.  The IVC is enlarged, measuring >3cm. Elevated central venous pressure (15 mmHg).  There is pulmonary hypertension.  Biatrial enlargement.  Moderate circumferential pericardial effusion with largest diameter posterolaterally at 1.5cm.  There is a left pleural effusion    Outside labs from 2/10/2022 shows serum sodium of 126 with potassium of 5.2.  Given these findings and his severe SOB, pt will require inpatient admission and further management of severe volume overload.  I instructed Mr. Kelly to report immediately to the emergency department for admission.  His wife states they live in [place] and will go to [facility] for admission.      -On 2/14/2022, pt was admitted to [facility] with acute hypoxic respiratory failure due to volume overload with acute on chronic diastolic heart failure exacerbation. He was initiated on CHF pathway with IV lasix. Good diuresis with improvement in hypoxia and dyspnea. Initially required lasix gtt which was then de-escalated to IV pushes then PO bumex.  Weight at discharge was 240 pounds.     -At follow up clinic visit on 3/23/2022, Mr. Kelly was accompanied by his wife.  States he feels much better since hospital discharge.  He has no chest pain or SOB.  States he's taking all medications as prescribed with no issues.   Plan:   BLE Lymphedema with Venous Stasis Wounds- Wounds have now completely healed.  BLE Venous Ultrasound on 2/15/2022 revealed no findings of right or left lower extremity deep venous thrombophlebitis.  Continue bumex 1 mg daily.    Pt to limit sodium intake to 2,000 mg daily.  Limit volume intake to 1.5 liters daily.    SOB- Due to acute on chronic HFPEF, which has now resolved.  Echo as above.  Continue current medications.    HTN- Continue current medications.  Pt to keep log of blood pressure/heart rate and bring in next visit for review.   Obesity- Encourage diet, exercise and weight loss.      6/27/2022  clinic visit: Mr. Kelly is accompanied by his wife.  He reports doing well with no chest pain, SOB, significant LE edema, TIA symptoms or syncope.  States he's taking all medications as prescribed with no issues.  Plan:   BLE Lymphedema with Venous Stasis Wounds- Wounds have now completely healed.  BLE Venous Ultrasound on 2/15/2022 revealed no findings of right or left lower extremity deep venous thrombophlebitis.  Continue bumex 1 mg daily.    Pt to limit sodium intake to 2,000 mg daily.  Limit volume intake to 1.5 liters daily.    Chronic HFpEF- Stable.  Echo as above.  Continue current medications.  Check cmp and lipids.   HTN- Continue current medications.  Pt to keep log of blood pressure/heart rate and bring in next visit for review.   Obesity- Encourage diet, exercise and weight loss.      2/15/2023 clinic visit: Mr. Kelly is accompanied by his wife.  He reports doing well with no chest pain, SOB, significant LE edema, TIA symptoms or syncope.  States he's taking all medications as prescribed with no issues.   He has gained 15 pounds since clinic visit on 6/27/2022.    Plan:   BLE Lymphedema with Venous Insufficiency- Stable.  Continue bumex 1 mg daily. Pt to limit sodium intake to 2,000 mg daily.  Limit volume intake to 1.5 liters daily.    Chronic HFpEF- Stable.  Echo as above.  Continue current medications.  Check cmp and lipids.   HTN- Continue current medications.  Pt to keep log of blood pressure/heart rate and bring in next visit for review.   Obesity- Encourage diet, exercise and weight loss.    HPI:  Mr. Kelly is accompanied by his wife and daughter.  He was admitted to [facility] on 5/27 and 7/4/2023 with volume overload.  Echo on 5/26/2023 significant for  EF 60%; mild AS; pulmonary HTN with estimated PA systolic pressure is 51 mmHg.    Past Medical History:   Diagnosis Date    Bilateral hearing loss     Hypertension      No past surgical history on file.  Social History     Socioeconomic  History    Marital status:    Tobacco Use    Smoking status: Never    Smokeless tobacco: Never     Social Determinants of Health     Financial Resource Strain: Low Risk     Difficulty of Paying Living Expenses: Not hard at all   Food Insecurity: No Food Insecurity    Worried About Running Out of Food in the Last Year: Never true    Ran Out of Food in the Last Year: Never true   Transportation Needs: No Transportation Needs    Lack of Transportation (Medical): No    Lack of Transportation (Non-Medical): No   Physical Activity: Unknown    Days of Exercise per Week: Patient refused    Minutes of Exercise per Session: Patient refused   Stress: Unknown    Feeling of Stress : Patient refused   Social Connections: Unknown    Frequency of Communication with Friends and Family: More than three times a week    Frequency of Social Gatherings with Friends and Family: More than three times a week    Attends Hoahaoism Services: Patient refused    Active Member of Clubs or Organizations: Patient refused    Attends Club or Organization Meetings: Patient refused    Marital Status:    Housing Stability: Unknown    Unable to Pay for Housing in the Last Year: No    Unstable Housing in the Last Year: No     Family History   Problem Relation Age of Onset    No Known Problems Mother     No Known Problems Father        Review of patient's allergies indicates:  No Known Allergies    Medication List with Changes/Refills   Current Medications    APPLE CIDER VINEGAR ORAL    Take by mouth.    BUDESONIDE-FORMOTEROL 160-4.5 MCG (SYMBICORT) 160-4.5 MCG/ACTUATION HFAA    Inhale 2 puffs into the lungs every 12 (twelve) hours.    BUMETANIDE (BUMEX) 1 MG TABLET    Take 1 tablet (1 mg total) by mouth 2 (two) times a day.    CETIRIZINE (ZYRTEC) 10 MG TABLET    Take 10 mg by mouth once daily.    EMOLLIENT COMBINATION NO.71 ([facility] ADVANCED [facility]AP[facility] TOP)    Apply topically 2 (two) times a day.    MULTIVIT-MIN-FOLIC-VIT K-LYCOP (ONE-A-DAY  "MEN'S 50 PLUS,VIT K,) 400- MCG TAB    Take 1 tablet by mouth once daily.    POTASSIUM CHLORIDE SA (K-DUR,KLOR-CON) 20 MEQ TABLET    Take 1 tablet (20 mEq total) by mouth once daily.    SAW PALMETTO 500 MG CAPSULE    Take 500 mg by mouth once daily.    SERINE, BULK, 100 % SUSAN    Take 1 capsule by mouth once daily.    VALSARTAN (DIOVAN) 320 MG TABLET    TAKE ONE TABLET BY MOUTH ONCE DAILY.    VITAMIN D (VITAMIN D3) 1000 UNITS TAB    Take 1,000 Units by mouth once daily.       Review of Systems  Constitution: Denies chills, fever, and sweats.  HENT: Denies headaches or blurry vision.  Cardiovascular: Denies chest pain or irregular heart beat.  Respiratory: Denies cough or shortness of breath.  Gastrointestinal: Denies abdominal pain, nausea, or vomiting.  Musculoskeletal: Negative for leg swelling.  Neurological: Denies dizziness or focal weakness.  Psychiatric/Behavioral: Normal mental status.  Hematologic/Lymphatic: Denies bleeding problem or easy bruising/bleeding.  Skin: Denies rash or suspicious lesions    Physical Examination  BP (!) 141/69   Pulse 83   Ht 5' 11" (1.803 m)   Wt 102.5 kg (225 lb 15.5 oz)   BMI 31.52 kg/m²     Constitutional: No acute distress, conversant  HEENT: Sclera anicteric, Pupils equal, round and reactive to light, extraocular motions intact, Oropharynx clear  Neck: No JVD, no carotid bruits  Cardiovascular: regular rate and rhythm, no murmur, rubs or gallops, normal S1/S2  Pulmonary: Clear to auscultation bilaterally  Abdominal: Abdomen soft, nontender, nondistended, positive bowel sounds  Extremities: BLE's with trace pitting edema and changes consistent with lymphedema  Left shin with a moderate sized healing wound.   Both legs with diffuse, erythematous, lacy rash    Pulses:  Carotid pulses are 2+ on the right side, and 2+ on the left side.  Radial pulses are 2+ on the right side, and 2+ on the left side.   Femoral pulses are 2+ on the right side, and 2+ on the left " side.  Popliteal pulses are 2+ on the right side, and 2+ on the left side.   Dorsalis pedis pulses are 2+ on the right side, and 2+ on the left side.   Posterior tibial pulses are 2+ on the right side, and 2+ on the left side.    Skin: No ecchymosis, erythema, or ulcers  Psych: Alert and oriented x 3, appropriate affect  Neuro: CNII-XII intact, no focal deficits    Labs:  Most Recent Data  CBC:   Lab Results   Component Value Date    WBC 8.21 07/05/2023    HGB 13.1 (L) 07/05/2023    HCT 39.2 (L) 07/05/2023     07/05/2023    MCV 89 07/05/2023    RDW 13.7 07/05/2023     BMP:   Lab Results   Component Value Date     (L) 07/05/2023    K 3.8 07/05/2023    CL 86 (L) 07/05/2023    CO2 31 (H) 07/05/2023    BUN 11 07/05/2023    CREATININE 0.7 07/05/2023     (H) 07/05/2023    CALCIUM 9.3 07/05/2023    MG 1.8 07/04/2023    PHOS 3.8 07/04/2023     LFTS;   Lab Results   Component Value Date    PROT 7.0 07/05/2023    ALBUMIN 3.4 (L) 07/05/2023    BILITOT 0.9 07/05/2023    AST 23 07/05/2023    ALKPHOS 151 (H) 07/05/2023    ALT 21 07/05/2023     COAGS:   Lab Results   Component Value Date    INR 1.1 05/26/2023     FLP:   Lab Results   Component Value Date    CHOL 143 02/08/2023    HDL 74 02/08/2023    LDLCALC 58 02/08/2023    TRIG 37 02/08/2023    CHOLHDL 1.9 02/08/2023     CARDIAC:   Lab Results   Component Value Date    TROPONINI <0.006 07/04/2023     (H) 07/04/2023     (H) 07/04/2023       Echo 5/26/2023:  The left ventricle is normal in size with concentric remodeling and normal systolic function.  The estimated ejection fraction is 65%.  Indeterminate left ventricular diastolic function.  Normal right ventricular size with normal right ventricular systolic function.  Mild aortic regurgitation.  Mild tricuspid regurgitation.  Moderate left atrial enlargement.  Moderate right atrial enlargement.  There is mild aortic valve stenosis.  Aortic valve area is 1.61 cm2; peak velocity is 2.42 m/s; mean  gradient is 13 mmHg.  Intermediate central venous pressure (8 mmHg).  The estimated PA systolic pressure is 51 mmHg.  There is pulmonary hypertension.  Small to moderate posterior pericardial effusion.  The ascending aorta is mildly dilated.    Echo 2/14/2022:  The left ventricle is normal in size with normal systolic function.  The estimated ejection fraction is 65%.  Grade II left ventricular diastolic dysfunction.  The ascending aorta is mildly dilated.  Mild right ventricular enlargement with normal right ventricular systolic function.  Mild tricuspid regurgitation.  The estimated PA systolic pressure is 66 mmHg.  The IVC is enlarged, measuring >3cm. Elevated central venous pressure (15 mmHg).  There is pulmonary hypertension.  Biatrial enlargement.  Moderate circumferential pericardial effusion with largest diameter posterolaterally at 1.5cm.  There is a left pleural effusion.    BLE Venous Ultrasound 2/15/2022:  1. No findings of right or left lower extremity deep venous thrombophlebitis  2. Bilateral lower extremity soft tissue edema.  3. Prominent enlarged left groin lymph node.    Assessment/Plan:  Alexander Kelly is a 83 y.o. male with HFpEF, HTN, COPD, bilateral hearing loss, alcoholism, obesity, who presents for a follow up appointment.    1. BLE Lymphedema with Venous Insufficiency- Stable. Continue bumex 1 mg daily. Pt to limit sodium intake to 2,000 mg daily.  Limit volume intake to 1.5 liters daily.      2. Chronic HFpEF- Pt with 2 recent admissions for volume overload and SOB.  Echo on 5/26/2023 significant for  EF 60%; mild AS; pulmonary HTN with estimated PA systolic pressure is 51 mmHg.  Check nuclear stress test to evaluate for ischemia.  Refer to Pulmonary HTN clinic for evaluation .  Pt will benefit from RHC.  Continue bumex 1 mg bid.  Pt to limit sodium intake to 2,000 mg daily.  Limit volume intake to 1.5 liters daily. .     3. HTN- Continue current medications.  Pt to keep log of blood  pressure/heart rate and bring in next visit for review.     4. Obesity- Encourage diet, exercise and weight loss.      Follow up in 3 months     Total duration of face to face visit time 30 minutes.  Total time spent counseling greater than fifty percent of total visit time.  Counseling included discussion regarding imaging findings, diagnosis, possibilities, treatment options, risks and benefits.  The patient had many questions regarding the options and long-term effects.    Artur Healy MD, PhD  Interventional Cardiology

## 2023-07-10 NOTE — PATIENT INSTRUCTIONS
Assessment/Plan:  Alexander Kelly is a 83 y.o. male with HFpEF, HTN, COPD, bilateral hearing loss, alcoholism, obesity, who presents for a follow up appointment.    1. BLE Lymphedema with Venous Insufficiency- Stable. Continue bumex 1 mg daily. Pt to limit sodium intake to 2,000 mg daily.  Limit volume intake to 1.5 liters daily.      2. Chronic HFpEF- Pt with 2 recent admissions for volume overload and SOB.  Echo on 5/26/2023 significant for  EF 60%; mild AS; pulmonary HTN with estimated PA systolic pressure is 51 mmHg.  Check nuclear stress test to evaluate for ischemia.  Refer to Pulmonary HTN clinic for evaluation .  Pt will benefit from RHC.  Continue bumex 1 mg bid.  Pt to limit sodium intake to 2,000 mg daily.  Limit volume intake to 1.5 liters daily. .     3. HTN- Continue current medications.  Pt to keep log of blood pressure/heart rate and bring in next visit for review.     4. Obesity- Encourage diet, exercise and weight loss.      Follow up in 3 months

## 2023-07-24 PROBLEM — I48.92 ATRIAL FLUTTER: Status: ACTIVE | Noted: 2023-01-01

## 2023-07-24 PROBLEM — I50.33 ACUTE ON CHRONIC DIASTOLIC HEART FAILURE: Status: RESOLVED | Noted: 2023-01-01 | Resolved: 2023-01-01

## 2023-07-24 NOTE — PROGRESS NOTES
Subjective:     Patient ID:  Alexander Kelly is a 83 y.o. male who presents for evaluation of pulmonary hypertension     HPI:  84 yo WM referred by Dr. Artur Healy for right heart catheterization to assess pulmonary hypertension and guide treatment.  He has been bothered with congestive heart failure in his echocardiogram normal left ventricular systolic function, indeterminate left ventricular diastolic function, estimated CVP 8 mm Hg and estimated pulmonary artery systolic pressure 51 mm Hg.  He has wall thickness approximately 1.2 cm but RV trabeculations present difficult measurement.  Calcification noted within the mitral valve annulus and aortic valve slightly calcified with sclerosis.  Read as mild stenosis.  An EKG performed in July 2023 demonstrated atrial flutter with controlled ventricular response and low voltage.  A nuclear stress study performed July 14, 2023 demonstrated no evidence of ischemia or infarction.    His biggest problem seems to be significant peripheral edema, increased abdominal girth and shortness of breath.  He sleeps on 3 pillows (thin) without PND.  He sleeps with his legs elevated to to his chronic edema.  He is a cough with white phlegm but that is present during the daytime as well as night.  He has no symptoms suggestive of angina.    Occupational exposure:  He worked as an  for over 25 years and then became an independent .  He worked until the age of 75.    Past Medical History:   Diagnosis Date    Atrial flutter     Bilateral hearing loss     CHF (congestive heart failure)     Hypertension     Pulmonary hypertension      Current Outpatient Medications   Medication Sig Dispense Refill    APPLE CIDER VINEGAR ORAL Take by mouth.      budesonide-formoterol 160-4.5 mcg (SYMBICORT) 160-4.5 mcg/actuation HFAA Inhale 2 puffs into the lungs every 12 (twelve) hours. 1 g 3    bumetanide (BUMEX) 1 MG tablet Take 1 tablet (1 mg total) by mouth 2 (two) times a day. 90  tablet 3    cetirizine (ZYRTEC) 10 MG tablet Take 10 mg by mouth once daily.      emollient combination no.71 (LUBRIDERM ADVANCED THERAPY TOP) Apply topically 2 (two) times a day.      multivit-min-folic-vit K-lycop (ONE-A-DAY MEN'S 50 PLUS,VIT K,) 400- mcg Tab Take 1 tablet by mouth once daily.      potassium chloride SA (K-DUR,KLOR-CON) 20 MEQ tablet Take 1 tablet (20 mEq total) by mouth once daily. 90 tablet 3    saw palmetto 500 MG capsule Take 500 mg by mouth once daily.      serine, bulk, 100 % Aline Take 1 capsule by mouth once daily.      valsartan (DIOVAN) 320 MG tablet TAKE ONE TABLET BY MOUTH ONCE DAILY. (Patient taking differently: Take 320 mg by mouth once daily.) 90 tablet 3    vitamin D (VITAMIN D3) 1000 units Tab Take 1,000 Units by mouth once daily.       No current facility-administered medications for this visit.       Review of patient's allergies indicates:  No Known Allergies    SOCIAL HISTORY:  He is  and accompanied by his wife and daughter.  He worked until the age of 75.  He spent 25 years as  and the and the remainder of his employment was spent working as an independent .  He smoked for 17 years but quit 51 years ago.  He consumes 8 beers per day until recently when he essentially stopped drinking.    Review of Systems   Constitutional: Positive for malaise/fatigue. Negative for decreased appetite (daughter feels that he is not eating as much as he used to attributed to his swallowing difficulties not an appetite loss), weight gain (While his weight is up and down based upon fluid status there has been no significant or unexplained weight loss) and weight loss.   HENT:  Positive for hearing loss (started around age 71).    Cardiovascular:  Positive for dyspnea on exertion (he reports shortness of breath walking in his home), leg swelling and orthopnea (3 pillows but thin). Negative for chest pain, near-syncope, palpitations, paroxysmal nocturnal dyspnea and  "syncope.   Respiratory:  Positive for cough, shortness of breath and sputum production.    Hematologic/Lymphatic: Bruises/bleeds easily (bruising).   Musculoskeletal:         No history of tendon rupture or carpal tunnel syndrome   Gastrointestinal:  Positive for bloating, constipation (he takes Metamucil 1st constipation and occasional laxatives which in turn the wife feels is responsible for the diarrhea that he experiences), diarrhea and dysphagia (he has trouble swallowing pills and occasionally with food the does not believe anything gets stuck.  One of his physicians recently recommended GI evaluation but he has not undergone that yet. no pain with swallowing and no regurgitation). Negative for change in bowel habit, bowel incontinence, hematemesis, nausea and vomiting.   Genitourinary:  Positive for frequency and nocturia. Negative for bladder incontinence.   Neurological:  Positive for weakness. Negative for brief paralysis, dizziness, focal weakness, light-headedness, numbness and paresthesias.        No symptoms consistent with carpal tunnel syndrome        Objective:   Physical Exam  Constitutional:       General: He is not in acute distress.     Appearance: He is well-developed. He is not ill-appearing, toxic-appearing or diaphoretic.      Comments: BP (!) 151/72 (BP Location: Left arm, Patient Position: Sitting, BP Method: Small (Automatic))   Pulse 80   Ht 5' 11" (1.803 m)   Wt 98.9 kg (218 lb)   SpO2 (!) 94%   BMI 30.40 kg/m²   This gentleman while ill appearing is in no acute distress   HENT:      Head: Normocephalic and atraumatic.   Eyes:      General: No scleral icterus.        Right eye: No discharge.         Left eye: No discharge.      Conjunctiva/sclera: Conjunctivae normal.   Neck:      Thyroid: No thyromegaly.      Vascular: JVD present.      Trachea: No tracheal deviation.      Comments: Venous pulsations are into the upper neck  Cardiovascular:      Rate and Rhythm: Normal rate and " regular rhythm.      Heart sounds: Murmur (grade 1-2/6 systolic murmur base of heart) heard.     No gallop.   Pulmonary:      Effort: Pulmonary effort is normal.      Breath sounds: Normal breath sounds.   Abdominal:      General: Bowel sounds are normal. There is distension.      Palpations: Abdomen is soft. There is hepatomegaly. There is no mass.      Tenderness: There is no abdominal tenderness. There is no guarding or rebound.      Hernia: A hernia (umbilical hernia is present) is present.      Comments: Difficult for me to determine liver edge sitting in the chair but using the scratch test appears to be 16 cm   Musculoskeletal:         General: Swelling (he has swelling of both lower extremities with stasis changes in both legs.  Above the knee edema is 1+ but extends into the abdominal wall.) present. No tenderness.      Right lower leg: Edema present.      Left lower leg: Edema present.   Skin:     General: Skin is warm and dry.   Neurological:      Mental Status: He is alert.      Comments: A formal neurological examination was not performed   Psychiatric:         Mood and Affect: Mood normal.         Behavior: Behavior normal.         Thought Content: Thought content normal.         Judgment: Judgment normal.      Lab Results   Component Value Date     (H) 07/04/2023     (H) 07/04/2023     (L) 07/05/2023    K 3.8 07/05/2023    MG 1.8 07/04/2023    CL 86 (L) 07/05/2023    CO2 31 (H) 07/05/2023    PHOS 3.8 07/04/2023    BUN 11 07/05/2023    BUN 12.0 02/09/2022    CREATININE 0.7 07/05/2023     (H) 07/05/2023    AST 23 07/05/2023    ALT 21 07/05/2023    ALBUMIN 3.4 (L) 07/05/2023    PROT 7.0 07/05/2023    BILITOT 0.9 07/05/2023    WBC 8.21 07/05/2023    HGB 13.1 (L) 07/05/2023    HCT 39.2 (L) 07/05/2023     07/05/2023    INR 1.1 05/26/2023    TSH 0.581 05/25/2023    CHOL 143 02/08/2023    HDL 74 02/08/2023    LDLCALC 58 02/08/2023    TRIG 37 02/08/2023 07/14/2023  pharmacologic nuclear stress study  Normal myocardial perfusion scan.  No evidence of ischemia, infarct.    07/04/2023 EKG atrial flutter with controlled ventricular response, low voltage    5/26/2023 ECHO   The left ventricle is normal in size with concentric remodeling and normal systolic function.  The estimated ejection fraction is 65%.  Indeterminate left ventricular diastolic function.  Normal right ventricular size with normal right ventricular systolic function.  Mild aortic regurgitation.  Mild tricuspid regurgitation.  Moderate left atrial enlargement.  Moderate right atrial enlargement.  There is mild aortic valve stenosis.  Aortic valve area is 1.61 cm2; peak velocity is 2.42 m/s; mean gradient is 13 mmHg.  Intermediate central venous pressure (8 mmHg).  The estimated PA systolic pressure is 51 mmHg.  There is pulmonary hypertension.  Small to moderate posterior pericardial effusion.  The ascending aorta is mildly dilated.    Assessment:     1. Pulmonary hypertension    2. SOB (shortness of breath)    3. Typical atrial flutter    4. Chronic diastolic congestive heart failure    5. Primary hypertension    6. Venous stasis dermatitis of both lower extremities    7. Anemia, unspecified type      Plan:   He either has worse pulmonary hypertension then echo appreciated and right ventricular failure or he has WHO group 2 pulmonary hypertension with significant decompensation.  Options are to push diuretics while watching renal function and blood pressure or proceed with right heart catheterization to determine pulmonary artery pressure, pulmonary vascular resistance and wedge pressure using that information to guide treatment.  I have discussed the right heart catheterization including the risk of the procedure.  I think going with a right heart catheterization this point is quite reasonable and they would like to do that as well.  We will try to get this done within the next 2 weeks while he will continue his  diuretics as it this point he feels that it is continuing to help him remove some volume.    If the atrial flutter is indeed new he would likely benefit from cardioversion or ablation of the flutter pathway.  However, the patient has made himself DNR so I am not certain if he wants to proceed with those procedures which have more risk than a right heart catheterization.  He would certainly not stay a DNR for those procedures and I have also explained that he can not be a DNR for his right heart catheterization it would simply be rescinded for the time he was in the procedure itself.  Right heart catheterization would be very low risk to have a complication from particularly if the [place] is floated under fluoroscopy.    I will pass all along to Dr. Healy.      I appreciate the opportunity to see this nice gentleman in consultation

## 2023-07-24 NOTE — Clinical Note
Artur, thank you for the opportunity to see this nice gentleman.  I will proceed with right heart catheterization the week of July 31st.  I noticed that he is in atrial flutter on his last EKG which appears to be new.  If so he would likely benefit from cardioversion and/or ablation.  I did not approach this with him in any great detail as I would defer this to your consideration.  He made himself a DNR and I have no problem performing a right heart catheterization but suspect they would not want to do an ablation or cardioversion if he was DNR.  That is another reason I did not want to going rate detail with him about this option.  I am sending you a copy of my note as well.

## 2023-08-04 NOTE — DISCHARGE INSTRUCTIONS

## 2023-08-04 NOTE — Clinical Note
The PA catheter was repositioned to the main pulmonary artery. Hemodynamics were performed. O2 saturation was measured. CO=4.8  CI=2.2

## 2023-08-04 NOTE — Clinical Note
The right neck was prepped. The site was prepped with ChloraPrep. The site was clipped. The patient was draped.

## 2023-08-04 NOTE — PLAN OF CARE
Patient arrived to room. Admit assessment completed. Plan of care discussed with patient. Will monitor. Call light within reach, instructed in use.

## 2023-08-04 NOTE — PROGRESS NOTES
EP NELLIE Ruth brought pt over to room 3 on [facility]. Family at bedside. MD at bedside giving plan of care information. Pt free from s/s of distress. VS WNL. Pt's right upper chest dressing is CDI. Gauze and tegaderm are intact. Pt given discharge information and education reiterated. Pt wheeled out by family.    Satisfactory

## 2023-08-04 NOTE — BRIEF OP NOTE
Patient tolerated the procedure well. Please see complete procedure note for full details and results. Stable to return from cath lab to their room.  Procedure: Right heart catheterization   Procedure date: 08/04/2023  Discharge date: 08/04/2023  Estimated blood loss: less than 10 cc  Complications: none  Condition at discharge: stable  Discharge instructions: no heavy lifting greater than 5 lbs and no bearing down/coughing without holding pressure over incision site.

## 2023-08-04 NOTE — DISCHARGE SUMMARY
[facility] - Cath Lab  Discharge Note  Short Stay    Procedure(s) (LRB):  INSERTION, CATHETER, RIGHT HEART (Right)      OUTCOME: Patient tolerated treatment/procedure well without complication and is now ready for discharge.    DISPOSITION: Home or Self Care    FINAL DIAGNOSIS:  Pulmonary hypertension    FOLLOWUP: In clinic    DISCHARGE INSTRUCTIONS: No heavy lifting greater than 5 lbs and no bearing down/coughing without holding pressure over incision site.              TIME SPENT ON DISCHARGE: 10 minutes

## 2023-08-04 NOTE — INTERVAL H&P NOTE
The patient has been examined and the H&P has been reviewed:    I concur with the findings and no changes have occurred since H&P was written.    Procedure risks, benefits and alternative options discussed and understood by patient/family.    Patient is DNR. Explained he will, need to rescind this and be Full Code for the procedure. He understands and wishes to proceed as Full Code for the procedure. After this, he will return as DNR. Form filled and signed/witnessed.       There are no hospital problems to display for this patient.

## 2023-08-04 NOTE — Clinical Note
The right chest and right neck was prepped. The site was prepped with ChloraPrep. The site was clipped. The patient was draped.

## 2023-08-08 NOTE — TELEPHONE ENCOUNTER
----- Message from Cassi Fierro MA sent at 8/7/2023  2:09 PM CDT -----  Regarding: FW: results    ----- Message -----  From: Greer Gustafson  Sent: 8/7/2023  10:50 AM CDT  To: [facility]  Subject: results                                          Pls call pt's daughter Joann Ramirez at [phone].  Pt had a rt heart cath on Friday and needs to clarify some results she received.    Thank you

## 2023-08-08 NOTE — TELEPHONE ENCOUNTER
Nurse navigator spoke with patients daughter and informed her that Dr. Neff is out of the office until next week. Patient's daughter states that when they were here for the [facility], they did not get any results and were just told that the patient had fluid on board and that he needed to increase his diuretic. The patient increased and has lost 5lbs so far and just had labs drawn yesterday (daughter will have results faxed to nurse navigator once they are available). Patient may most likely have WHO Group 2 which is generally treated by monitoring fluid , treating HBP, etc but Dr. Neff will make final recommendations once he reviews the [facility] results. Daughter verbalized understanding.

## 2023-08-17 NOTE — TELEPHONE ENCOUNTER
NN called and spoke with patient and patient's daughter about results from R[facility]. They were both informed that RHC results showed WHO Group 2 and does not warrant PH medications but patient should follow with cardiologist for fluid management. Daughter mentioned that she patient was referred to Dr. Neff at  for second opinion. NN verbalized understanding and just informed patient that he does not need to follow up here with PH clinic. Daughter will call Dr. Healy's office to see if they can get a sooner appointment. Patient states that he is feeling better with adjustments to diuretics.
